# Patient Record
Sex: FEMALE | Race: WHITE | NOT HISPANIC OR LATINO | Employment: OTHER | URBAN - METROPOLITAN AREA
[De-identification: names, ages, dates, MRNs, and addresses within clinical notes are randomized per-mention and may not be internally consistent; named-entity substitution may affect disease eponyms.]

---

## 2017-02-14 ENCOUNTER — OFFICE VISIT (OUTPATIENT)
Dept: RHEUMATOLOGY | Facility: CLINIC | Age: 62
End: 2017-02-14
Payer: MEDICARE

## 2017-02-14 VITALS
WEIGHT: 211.44 LBS | HEART RATE: 62 BPM | DIASTOLIC BLOOD PRESSURE: 99 MMHG | BODY MASS INDEX: 35.18 KG/M2 | SYSTOLIC BLOOD PRESSURE: 177 MMHG

## 2017-02-14 DIAGNOSIS — R73.9 HYPERGLYCEMIA: ICD-10-CM

## 2017-02-14 DIAGNOSIS — M47.817 LUMBAR AND SACRAL OSTEOARTHRITIS: ICD-10-CM

## 2017-02-14 DIAGNOSIS — M79.7 FIBROMYALGIA: ICD-10-CM

## 2017-02-14 DIAGNOSIS — M32.9 SYSTEMIC LUPUS ERYTHEMATOSUS, UNSPECIFIED SLE TYPE, UNSPECIFIED ORGAN INVOLVEMENT STATUS: ICD-10-CM

## 2017-02-14 DIAGNOSIS — B37.9 CANDIDA ALBICANS INFECTION: ICD-10-CM

## 2017-02-14 DIAGNOSIS — R09.81 SINUS CONGESTION: ICD-10-CM

## 2017-02-14 DIAGNOSIS — M54.2 NECK PAIN: ICD-10-CM

## 2017-02-14 PROCEDURE — 99213 OFFICE O/P EST LOW 20 MIN: CPT | Mod: PBBFAC,PO | Performed by: INTERNAL MEDICINE

## 2017-02-14 PROCEDURE — 99999 PR PBB SHADOW E&M-EST. PATIENT-LVL III: CPT | Mod: PBBFAC,,, | Performed by: INTERNAL MEDICINE

## 2017-02-14 PROCEDURE — 99214 OFFICE O/P EST MOD 30 MIN: CPT | Mod: S$PBB,,, | Performed by: INTERNAL MEDICINE

## 2017-02-14 RX ORDER — FLUCONAZOLE 150 MG/1
TABLET ORAL
COMMUNITY
Start: 2017-01-03 | End: 2017-02-14 | Stop reason: SDUPTHER

## 2017-02-14 RX ORDER — CYANOCOBALAMIN 1000 UG/ML
1000 INJECTION, SOLUTION INTRAMUSCULAR; SUBCUTANEOUS
Qty: 10 ML | Refills: 3 | Status: SHIPPED | OUTPATIENT
Start: 2017-02-14 | End: 2017-06-09 | Stop reason: SDUPTHER

## 2017-02-14 RX ORDER — METHYLPREDNISOLONE ACETATE 80 MG/ML
160 INJECTION, SUSPENSION INTRA-ARTICULAR; INTRALESIONAL; INTRAMUSCULAR; SOFT TISSUE DAILY
Qty: 2 ML | Refills: 3 | Status: SHIPPED | OUTPATIENT
Start: 2017-02-14 | End: 2017-07-18 | Stop reason: SDUPTHER

## 2017-02-14 RX ORDER — METOPROLOL TARTRATE 100 MG/1
100 TABLET ORAL 2 TIMES DAILY
Qty: 60 TABLET | Refills: 11 | Status: SHIPPED | OUTPATIENT
Start: 2017-02-14 | End: 2017-06-09 | Stop reason: SDUPTHER

## 2017-02-14 RX ORDER — ACETAMINOPHEN AND CODEINE PHOSPHATE 300; 30 MG/1; MG/1
1 TABLET ORAL 3 TIMES DAILY PRN
Qty: 90 TABLET | Refills: 3 | Status: SHIPPED | OUTPATIENT
Start: 2017-02-14 | End: 2018-01-24 | Stop reason: SDUPTHER

## 2017-02-14 RX ORDER — ESTRADIOL 1 MG/1
1 TABLET ORAL DAILY
Qty: 30 TABLET | Refills: 6 | Status: SHIPPED | OUTPATIENT
Start: 2017-02-14 | End: 2017-06-09 | Stop reason: SDUPTHER

## 2017-02-14 RX ORDER — SYRINGE REUSABLE, 3 ML
1 SYRINGE, REUSABLE MISCELLANEOUS DAILY
Qty: 25 EACH | Refills: 0 | Status: SHIPPED | OUTPATIENT
Start: 2017-02-14 | End: 2018-01-24 | Stop reason: SDUPTHER

## 2017-02-14 RX ORDER — FLUCONAZOLE 150 MG/1
150 TABLET ORAL DAILY
Qty: 5 TABLET | Refills: 0 | Status: SHIPPED | OUTPATIENT
Start: 2017-02-14 | End: 2017-02-19

## 2017-02-14 RX ORDER — HYDROXYCHLOROQUINE SULFATE 200 MG/1
200 TABLET, FILM COATED ORAL 2 TIMES DAILY
Qty: 60 TABLET | Refills: 6 | Status: SHIPPED | OUTPATIENT
Start: 2017-02-14 | End: 2017-06-09 | Stop reason: SDUPTHER

## 2017-02-14 RX ORDER — HYDROCHLOROTHIAZIDE 12.5 MG/1
CAPSULE ORAL
Qty: 30 CAPSULE | Refills: 3 | Status: SHIPPED | OUTPATIENT
Start: 2017-02-14 | End: 2017-06-09 | Stop reason: SDUPTHER

## 2017-02-14 RX ORDER — GABAPENTIN 100 MG/1
100 CAPSULE ORAL 3 TIMES DAILY
Qty: 90 CAPSULE | Refills: 11 | Status: SHIPPED | OUTPATIENT
Start: 2017-02-14 | End: 2017-06-09 | Stop reason: SDUPTHER

## 2017-02-14 RX ORDER — KETOROLAC TROMETHAMINE 30 MG/ML
60 INJECTION, SOLUTION INTRAMUSCULAR; INTRAVENOUS
Qty: 2 ML | Refills: 3 | Status: SHIPPED | OUTPATIENT
Start: 2017-02-14 | End: 2017-07-18 | Stop reason: SDUPTHER

## 2017-02-14 ASSESSMENT — ROUTINE ASSESSMENT OF PATIENT INDEX DATA (RAPID3)
PAIN SCORE: 7
AM STIFFNESS SCORE: 1, YES
PATIENT GLOBAL ASSESSMENT SCORE: 7
MDHAQ FUNCTION SCORE: 1.1
FATIGUE SCORE: 5
TOTAL RAPID3 SCORE: 5.89
PSYCHOLOGICAL DISTRESS SCORE: 4.4

## 2017-02-14 NOTE — PROGRESS NOTES
Subjective:       Patient ID: Angelica Chan is a 61 y.o. female.    Chief Complaint: Follow-up    HPI Comments: Follow up:   Lupus  Flare, Patient complains of arthralgias and myalgias for which has been present for a few years. Pain is located in multiple joints, both shoulder(s), both elbow(s), both wrist(s), both MCP(s): 1st, 2nd, 3rd, 4th and 5th, both PIP(s): 1st, 2nd, 3rd, 4th and 5th, both DIP(s): 1st and 2nd, both hip(s), both knee(s) and both MTP(s): 1st, 2nd, 3rd, 4th and 5th, is described as aching, pulsating, shooting and throbbing, and is constant, moderate .  Associated symptoms include: crepitation, decreased range of motion, edema, effusion, tenderness and warmth.  She is in a flare                    Review of Systems   Constitutional: Positive for activity change and chills. Negative for appetite change, diaphoresis and unexpected weight change.   HENT: Negative for congestion, dental problem, ear discharge, ear pain, facial swelling, mouth sores, nosebleeds, postnasal drip, rhinorrhea, sinus pressure, sneezing, sore throat, tinnitus and voice change.    Eyes: Negative for photophobia, pain, discharge, redness and itching.   Respiratory: Negative for apnea, cough, chest tightness, shortness of breath and wheezing.    Cardiovascular: Positive for leg swelling. Negative for chest pain and palpitations.   Gastrointestinal: Positive for abdominal pain. Negative for abdominal distention, constipation, diarrhea, nausea and vomiting.   Endocrine: Negative for cold intolerance, heat intolerance, polydipsia and polyuria.   Genitourinary: Negative for decreased urine volume, difficulty urinating, flank pain, frequency, hematuria and urgency.   Musculoskeletal: Positive for arthralgias, back pain, gait problem, neck pain and neck stiffness.   Skin: Negative for pallor, rash and wound.   Allergic/Immunologic: Negative for immunocompromised state.   Neurological: Positive for weakness. Negative for dizziness,  tremors and numbness.   Hematological: Negative for adenopathy. Does not bruise/bleed easily.   Psychiatric/Behavioral: Negative for sleep disturbance. The patient is not nervous/anxious.          Objective:     Visit Vitals    BP (!) 177/99 (BP Location: Left arm, Patient Position: Sitting, BP Method: Automatic)    Pulse 62    Wt 95.9 kg (211 lb 6.7 oz)    BMI 35.18 kg/m2        Physical Exam   Vitals reviewed.  Constitutional: She is oriented to person, place, and time. She appears distressed.   HENT:   Head: Normocephalic and atraumatic.   Mouth thrush   Eyes: EOM are normal. Pupils are equal, round, and reactive to light. Right eye exhibits no discharge. Left eye exhibits no discharge.   Neck: Neck supple. No thyromegaly present.   Cardiovascular: Normal rate, regular rhythm and normal heart sounds.  Exam reveals no gallop and no friction rub.    No murmur heard.  Pulmonary/Chest: Breath sounds normal. She has no wheezes. She has no rales. She exhibits no tenderness.   Abdominal: There is no tenderness. There is no rebound and no guarding.       Right Side Rheumatological Exam     Examination finds the elbow normal.    The patient is tender to palpation of the shoulder, wrist, knee, 1st PIP, 1st MCP, 2nd PIP, 2nd MCP, 3rd PIP, 3rd MCP, 4th PIP, 4th MCP, 5th PIP and 5th MCP    She has swelling of the 1st PIP, 1st MCP, 2nd PIP, 2nd MCP, 3rd PIP, 3rd MCP, 4th PIP, 4th MCP, 5th PIP and 5th MCP    Shoulder Exam   Tenderness Location: no tenderness    Range of Motion   Active Abduction: abnormal   Adduction: abnormal  Sensation: normal    Knee Exam   Patellofemoral Crepitus: positive  Effusion: positive  Sensation: normal    Hip Exam   Tenderness Location: posterior  Sensation: normal    Elbow/Wrist Exam   Tenderness Location: no tenderness  Sensation: normal    Left Side Rheumatological Exam     The patient is tender to palpation of the shoulder, elbow, wrist, knee, 1st PIP, 1st MCP, 2nd PIP, 2nd MCP, 3rd PIP,  3rd MCP, 4th PIP, 4th MCP, 5th PIP and 5th MCP.    She has swelling of the 1st PIP, 1st MCP, 2nd PIP, 2nd MCP, 3rd PIP, 3rd MCP, 4th PIP, 4th MCP, 5th PIP and 5th MCP    Shoulder Exam   Tenderness Location: no tenderness    Range of Motion   Active Abduction: abnormal   Sensation: normal    Knee Exam     Patellofemoral Crepitus: positive  Effusion: positive  Sensation: normal    Hip Exam   Tenderness Location: posterior  Sensation: normal    Elbow/Wrist Exam   Sensation: normal      Back/Neck Exam   General Inspection   Gait: normal         Lymphadenopathy:     She has no cervical adenopathy.   Neurological: She is alert and oriented to person, place, and time. Gait normal.   Skin: Skin is dry. No rash noted. No erythema. No pallor.     Psychiatric: Mood and affect normal.   Musculoskeletal: She exhibits edema, tenderness and deformity.      jhonny 3.9 normal less  crp 4.0, cbc, cmp  Assessment:       .  Encounter Diagnoses   Name Primary?    Mixed connective tissue disease     Systemic lupus erythematosus, unspecified SLE type, unspecified organ involvement status     Neck pain     Fibromyalgia     Lumbar and sacral osteoarthritis     Essential hypertension     Thrush     Sinus congestion     Hyperglycemia      Yeast infection     Candida albicans infection         Plan:     Systemic lupus erythematosus, unspecified SLE type, unspecified organ involvement status  -     acetaminophen-codeine 300-30mg (TYLENOL #3) 300-30 mg Tab; Take 1 tablet by mouth 3 (three) times daily as needed.  Dispense: 90 tablet; Refill: 3  -     cyanocobalamin 1,000 mcg/mL injection; Inject 1 mL (1,000 mcg total) into the skin every 7 days.  Dispense: 10 mL; Refill: 3  -     estradiol (ESTRACE) 1 MG tablet; Take 1 tablet (1 mg total) by mouth once daily.  Dispense: 30 tablet; Refill: 6  -     gabapentin (NEURONTIN) 100 MG capsule; Take 1 capsule (100 mg total) by mouth 3 (three) times daily.  Dispense: 90 capsule; Refill: 11  -      hydrochlorothiazide (MICROZIDE) 12.5 mg capsule; Take 1 capsule(12.5 mg total) by mouth once daily.  Dispense: 30 capsule; Refill: 3  -     hydroxychloroquine (PLAQUENIL) 200 mg tablet; Take 1 tablet (200 mg total) by mouth 2 (two) times daily.  Dispense: 60 tablet; Refill: 6  -     ketorolac (TORADOL) 60 mg/2 mL Soln; Inject 2 mLs (60 mg total) into the muscle every 30 days.  Dispense: 2 mL; Refill: 3  -     methylPREDNISolone acetate (DEPO-MEDROL) 80 mg/mL injection; Inject 2 mLs (160 mg total) into the muscle once daily.  Dispense: 2 mL; Refill: 3  -     metoprolol tartrate (LOPRESSOR) 100 MG tablet; Take 1 tablet (100 mg total) by mouth 2 (two) times daily.  Dispense: 60 tablet; Refill: 11  -     MARTI; Future; Expected date: 2/14/17  -     Anti Sm/RNP Antibody; Future; Expected date: 2/14/17  -     Anti-DNA antibody, double-stranded; Future; Expected date: 2/14/17  -     Anti-Histone Antibody; Future; Expected date: 2/14/17  -     Anti-scleroderma antibody; Future; Expected date: 2/14/17  -     Anti-Smith antibody; Future; Expected date: 2/14/17  -     Sjogrens syndrome-A extractable nuclear antibody; Future; Expected date: 2/14/17  -     Sjogrens syndrome-B extractable nuclear antibody; Future; Expected date: 2/14/17  -     Sedimentation rate, manual; Future; Expected date: 2/14/17  -     Comprehensive metabolic panel; Future; Expected date: 2/14/17  -     CBC auto differential; Future; Expected date: 2/14/17  -     C-reactive protein; Future; Expected date: 2/14/17  -     TSH; Future; Expected date: 2/14/17  -     T4, free; Future; Expected date: 2/14/17  -     T3, free; Future; Expected date: 2/14/17    Neck pain  -     acetaminophen-codeine 300-30mg (TYLENOL #3) 300-30 mg Tab; Take 1 tablet by mouth 3 (three) times daily as needed.  Dispense: 90 tablet; Refill: 3  -     estradiol (ESTRACE) 1 MG tablet; Take 1 tablet (1 mg total) by mouth once daily.  Dispense: 30 tablet; Refill: 6  -     gabapentin  (NEURONTIN) 100 MG capsule; Take 1 capsule (100 mg total) by mouth 3 (three) times daily.  Dispense: 90 capsule; Refill: 11  -     hydrochlorothiazide (MICROZIDE) 12.5 mg capsule; Take 1 capsule(12.5 mg total) by mouth once daily.  Dispense: 30 capsule; Refill: 3  -     hydroxychloroquine (PLAQUENIL) 200 mg tablet; Take 1 tablet (200 mg total) by mouth 2 (two) times daily.  Dispense: 60 tablet; Refill: 6  -     metoprolol tartrate (LOPRESSOR) 100 MG tablet; Take 1 tablet (100 mg total) by mouth 2 (two) times daily.  Dispense: 60 tablet; Refill: 11  -     MARTI; Future; Expected date: 2/14/17  -     Anti Sm/RNP Antibody; Future; Expected date: 2/14/17  -     Anti-DNA antibody, double-stranded; Future; Expected date: 2/14/17  -     Anti-Histone Antibody; Future; Expected date: 2/14/17  -     Anti-scleroderma antibody; Future; Expected date: 2/14/17  -     Anti-Smith antibody; Future; Expected date: 2/14/17  -     Sjogrens syndrome-A extractable nuclear antibody; Future; Expected date: 2/14/17  -     Sjogrens syndrome-B extractable nuclear antibody; Future; Expected date: 2/14/17  -     Sedimentation rate, manual; Future; Expected date: 2/14/17  -     Comprehensive metabolic panel; Future; Expected date: 2/14/17  -     CBC auto differential; Future; Expected date: 2/14/17  -     C-reactive protein; Future; Expected date: 2/14/17  -     TSH; Future; Expected date: 2/14/17  -     T4, free; Future; Expected date: 2/14/17  -     T3, free; Future; Expected date: 2/14/17    Fibromyalgia  -     acetaminophen-codeine 300-30mg (TYLENOL #3) 300-30 mg Tab; Take 1 tablet by mouth 3 (three) times daily as needed.  Dispense: 90 tablet; Refill: 3  -     cyanocobalamin 1,000 mcg/mL injection; Inject 1 mL (1,000 mcg total) into the skin every 7 days.  Dispense: 10 mL; Refill: 3  -     estradiol (ESTRACE) 1 MG tablet; Take 1 tablet (1 mg total) by mouth once daily.  Dispense: 30 tablet; Refill: 6  -     gabapentin (NEURONTIN) 100 MG  capsule; Take 1 capsule (100 mg total) by mouth 3 (three) times daily.  Dispense: 90 capsule; Refill: 11  -     hydrochlorothiazide (MICROZIDE) 12.5 mg capsule; Take 1 capsule(12.5 mg total) by mouth once daily.  Dispense: 30 capsule; Refill: 3  -     hydroxychloroquine (PLAQUENIL) 200 mg tablet; Take 1 tablet (200 mg total) by mouth 2 (two) times daily.  Dispense: 60 tablet; Refill: 6  -     ketorolac (TORADOL) 60 mg/2 mL Soln; Inject 2 mLs (60 mg total) into the muscle every 30 days.  Dispense: 2 mL; Refill: 3  -     methylPREDNISolone acetate (DEPO-MEDROL) 80 mg/mL injection; Inject 2 mLs (160 mg total) into the muscle once daily.  Dispense: 2 mL; Refill: 3  -     metoprolol tartrate (LOPRESSOR) 100 MG tablet; Take 1 tablet (100 mg total) by mouth 2 (two) times daily.  Dispense: 60 tablet; Refill: 11  -     MARTI; Future; Expected date: 2/14/17  -     Anti Sm/RNP Antibody; Future; Expected date: 2/14/17  -     Anti-DNA antibody, double-stranded; Future; Expected date: 2/14/17  -     Anti-Histone Antibody; Future; Expected date: 2/14/17  -     Anti-scleroderma antibody; Future; Expected date: 2/14/17  -     Anti-Smith antibody; Future; Expected date: 2/14/17  -     Sjogrens syndrome-A extractable nuclear antibody; Future; Expected date: 2/14/17  -     Sjogrens syndrome-B extractable nuclear antibody; Future; Expected date: 2/14/17  -     Sedimentation rate, manual; Future; Expected date: 2/14/17  -     Comprehensive metabolic panel; Future; Expected date: 2/14/17  -     CBC auto differential; Future; Expected date: 2/14/17  -     C-reactive protein; Future; Expected date: 2/14/17  -     TSH; Future; Expected date: 2/14/17  -     T4, free; Future; Expected date: 2/14/17  -     T3, free; Future; Expected date: 2/14/17    Lumbar and sacral osteoarthritis  -     acetaminophen-codeine 300-30mg (TYLENOL #3) 300-30 mg Tab; Take 1 tablet by mouth 3 (three) times daily as needed.  Dispense: 90 tablet; Refill: 3  -      estradiol (ESTRACE) 1 MG tablet; Take 1 tablet (1 mg total) by mouth once daily.  Dispense: 30 tablet; Refill: 6  -     gabapentin (NEURONTIN) 100 MG capsule; Take 1 capsule (100 mg total) by mouth 3 (three) times daily.  Dispense: 90 capsule; Refill: 11  -     hydrochlorothiazide (MICROZIDE) 12.5 mg capsule; Take 1 capsule(12.5 mg total) by mouth once daily.  Dispense: 30 capsule; Refill: 3  -     hydroxychloroquine (PLAQUENIL) 200 mg tablet; Take 1 tablet (200 mg total) by mouth 2 (two) times daily.  Dispense: 60 tablet; Refill: 6  -     metoprolol tartrate (LOPRESSOR) 100 MG tablet; Take 1 tablet (100 mg total) by mouth 2 (two) times daily.  Dispense: 60 tablet; Refill: 11  -     MARTI; Future; Expected date: 2/14/17  -     Anti Sm/RNP Antibody; Future; Expected date: 2/14/17  -     Anti-DNA antibody, double-stranded; Future; Expected date: 2/14/17  -     Anti-Histone Antibody; Future; Expected date: 2/14/17  -     Anti-scleroderma antibody; Future; Expected date: 2/14/17  -     Anti-Smith antibody; Future; Expected date: 2/14/17  -     Sjogrens syndrome-A extractable nuclear antibody; Future; Expected date: 2/14/17  -     Sjogrens syndrome-B extractable nuclear antibody; Future; Expected date: 2/14/17  -     Sedimentation rate, manual; Future; Expected date: 2/14/17  -     Comprehensive metabolic panel; Future; Expected date: 2/14/17  -     CBC auto differential; Future; Expected date: 2/14/17  -     C-reactive protein; Future; Expected date: 2/14/17  -     TSH; Future; Expected date: 2/14/17  -     T4, free; Future; Expected date: 2/14/17  -     T3, free; Future; Expected date: 2/14/17    Sinus congestion  -     cyanocobalamin 1,000 mcg/mL injection; Inject 1 mL (1,000 mcg total) into the skin every 7 days.  Dispense: 10 mL; Refill: 3  -     ketorolac (TORADOL) 60 mg/2 mL Soln; Inject 2 mLs (60 mg total) into the muscle every 30 days.  Dispense: 2 mL; Refill: 3  -     methylPREDNISolone acetate (DEPO-MEDROL) 80  mg/mL injection; Inject 2 mLs (160 mg total) into the muscle once daily.  Dispense: 2 mL; Refill: 3  -     MARTI; Future; Expected date: 2/14/17  -     Anti Sm/RNP Antibody; Future; Expected date: 2/14/17  -     Anti-DNA antibody, double-stranded; Future; Expected date: 2/14/17  -     Anti-Histone Antibody; Future; Expected date: 2/14/17  -     Anti-scleroderma antibody; Future; Expected date: 2/14/17  -     Anti-Smith antibody; Future; Expected date: 2/14/17  -     Sjogrens syndrome-A extractable nuclear antibody; Future; Expected date: 2/14/17  -     Sjogrens syndrome-B extractable nuclear antibody; Future; Expected date: 2/14/17  -     Sedimentation rate, manual; Future; Expected date: 2/14/17  -     Comprehensive metabolic panel; Future; Expected date: 2/14/17  -     CBC auto differential; Future; Expected date: 2/14/17  -     C-reactive protein; Future; Expected date: 2/14/17  -     TSH; Future; Expected date: 2/14/17  -     T4, free; Future; Expected date: 2/14/17  -     T3, free; Future; Expected date: 2/14/17    Hyperglycemia   -     estradiol (ESTRACE) 1 MG tablet; Take 1 tablet (1 mg total) by mouth once daily.  Dispense: 30 tablet; Refill: 6  -     gabapentin (NEURONTIN) 100 MG capsule; Take 1 capsule (100 mg total) by mouth 3 (three) times daily.  Dispense: 90 capsule; Refill: 11  -     hydrochlorothiazide (MICROZIDE) 12.5 mg capsule; Take 1 capsule(12.5 mg total) by mouth once daily.  Dispense: 30 capsule; Refill: 3  -     metoprolol tartrate (LOPRESSOR) 100 MG tablet; Take 1 tablet (100 mg total) by mouth 2 (two) times daily.  Dispense: 60 tablet; Refill: 11  -     MARTI; Future; Expected date: 2/14/17  -     Anti Sm/RNP Antibody; Future; Expected date: 2/14/17  -     Anti-DNA antibody, double-stranded; Future; Expected date: 2/14/17  -     Anti-Histone Antibody; Future; Expected date: 2/14/17  -     Anti-scleroderma antibody; Future; Expected date: 2/14/17  -     Anti-Smith antibody; Future; Expected  date: 2/14/17  -     Sjogrens syndrome-A extractable nuclear antibody; Future; Expected date: 2/14/17  -     Sjogrens syndrome-B extractable nuclear antibody; Future; Expected date: 2/14/17  -     Sedimentation rate, manual; Future; Expected date: 2/14/17  -     Comprehensive metabolic panel; Future; Expected date: 2/14/17  -     CBC auto differential; Future; Expected date: 2/14/17  -     C-reactive protein; Future; Expected date: 2/14/17  -     TSH; Future; Expected date: 2/14/17  -     T4, free; Future; Expected date: 2/14/17  -     T3, free; Future; Expected date: 2/14/17    Candida albicans infection  -     hydrochlorothiazide (MICROZIDE) 12.5 mg capsule; Take 1 capsule(12.5 mg total) by mouth once daily.  Dispense: 30 capsule; Refill: 3  -     MARTI; Future; Expected date: 2/14/17  -     Anti Sm/RNP Antibody; Future; Expected date: 2/14/17  -     Anti-DNA antibody, double-stranded; Future; Expected date: 2/14/17  -     Anti-Histone Antibody; Future; Expected date: 2/14/17  -     Anti-scleroderma antibody; Future; Expected date: 2/14/17  -     Anti-Smith antibody; Future; Expected date: 2/14/17  -     Sjogrens syndrome-A extractable nuclear antibody; Future; Expected date: 2/14/17  -     Sjogrens syndrome-B extractable nuclear antibody; Future; Expected date: 2/14/17  -     Sedimentation rate, manual; Future; Expected date: 2/14/17  -     Comprehensive metabolic panel; Future; Expected date: 2/14/17  -     CBC auto differential; Future; Expected date: 2/14/17  -     C-reactive protein; Future; Expected date: 2/14/17  -     TSH; Future; Expected date: 2/14/17  -     T4, free; Future; Expected date: 2/14/17  -     T3, free; Future; Expected date: 2/14/17    Other orders  -     fluconazole (DIFLUCAN) 150 MG Tab; Take 1 tablet (150 mg total) by mouth once daily.  Dispense: 5 tablet; Refill: 0

## 2017-03-25 DIAGNOSIS — M54.2 NECK PAIN: ICD-10-CM

## 2017-03-25 DIAGNOSIS — M32.9 LUPUS: ICD-10-CM

## 2017-03-25 DIAGNOSIS — R73.9 HYPERGLYCEMIA: ICD-10-CM

## 2017-03-25 DIAGNOSIS — M79.7 FIBROMYALGIA: ICD-10-CM

## 2017-03-25 DIAGNOSIS — B37.9 CANDIDA ALBICANS INFECTION: ICD-10-CM

## 2017-03-25 DIAGNOSIS — M35.1 MIXED CONNECTIVE TISSUE DISEASE: ICD-10-CM

## 2017-03-25 DIAGNOSIS — M47.817 LUMBAR AND SACRAL OSTEOARTHRITIS: ICD-10-CM

## 2017-03-25 RX ORDER — ESTRADIOL 1 MG/1
TABLET ORAL
Qty: 30 TABLET | Refills: 3 | Status: SHIPPED | OUTPATIENT
Start: 2017-03-25 | End: 2018-01-24 | Stop reason: SDUPTHER

## 2017-03-25 RX ORDER — HYDROCHLOROTHIAZIDE 12.5 MG/1
CAPSULE ORAL
Qty: 30 CAPSULE | Refills: 3 | Status: SHIPPED | OUTPATIENT
Start: 2017-03-25 | End: 2018-01-24 | Stop reason: SDUPTHER

## 2017-05-31 ENCOUNTER — TELEPHONE (OUTPATIENT)
Dept: RHEUMATOLOGY | Facility: CLINIC | Age: 62
End: 2017-05-31

## 2017-05-31 DIAGNOSIS — T14.90XA TRAUMA: Primary | ICD-10-CM

## 2017-06-05 ENCOUNTER — TELEPHONE (OUTPATIENT)
Dept: RHEUMATOLOGY | Facility: CLINIC | Age: 62
End: 2017-06-05

## 2017-06-05 NOTE — TELEPHONE ENCOUNTER
Attempted to contact pt in regards to lab and xray results. Left message informing pt that results have not been received. Informed that when results received and reviewed someone will contact her.

## 2017-06-05 NOTE — TELEPHONE ENCOUNTER
----- Message from Sridevi Jennings sent at 6/5/2017  3:12 PM CDT -----  Pt had labs and a x-ray at   [facility] in [place] / on June 1 or 2 ...had labs and a x-ray / please call with results [phone] (home)

## 2017-06-09 DIAGNOSIS — R73.9 HYPERGLYCEMIA: ICD-10-CM

## 2017-06-09 DIAGNOSIS — R09.81 SINUS CONGESTION: ICD-10-CM

## 2017-06-09 DIAGNOSIS — I10 ESSENTIAL HYPERTENSION: ICD-10-CM

## 2017-06-09 DIAGNOSIS — M32.9 SYSTEMIC LUPUS ERYTHEMATOSUS, UNSPECIFIED SLE TYPE, UNSPECIFIED ORGAN INVOLVEMENT STATUS: ICD-10-CM

## 2017-06-09 DIAGNOSIS — M47.817 LUMBAR AND SACRAL OSTEOARTHRITIS: ICD-10-CM

## 2017-06-09 DIAGNOSIS — B37.9 CANDIDA ALBICANS INFECTION: ICD-10-CM

## 2017-06-09 DIAGNOSIS — M79.7 FIBROMYALGIA: ICD-10-CM

## 2017-06-09 DIAGNOSIS — M35.1 MIXED CONNECTIVE TISSUE DISEASE: ICD-10-CM

## 2017-06-09 DIAGNOSIS — M54.2 NECK PAIN: ICD-10-CM

## 2017-06-09 DIAGNOSIS — B37.9 YEAST INFECTION: ICD-10-CM

## 2017-06-09 RX ORDER — HYDROXYCHLOROQUINE SULFATE 200 MG/1
200 TABLET, FILM COATED ORAL 2 TIMES DAILY
Qty: 180 TABLET | Refills: 3 | Status: SHIPPED | OUTPATIENT
Start: 2017-06-09 | End: 2018-01-24 | Stop reason: SDUPTHER

## 2017-06-09 RX ORDER — CYANOCOBALAMIN 1000 UG/ML
1000 INJECTION, SOLUTION INTRAMUSCULAR; SUBCUTANEOUS
Qty: 30 ML | Refills: 3 | Status: SHIPPED | OUTPATIENT
Start: 2017-06-09 | End: 2018-01-24 | Stop reason: SDUPTHER

## 2017-06-09 RX ORDER — ESTRADIOL 1 MG/1
1 TABLET ORAL DAILY
Qty: 90 TABLET | Refills: 3 | Status: SHIPPED | OUTPATIENT
Start: 2017-06-09 | End: 2017-07-18 | Stop reason: SDUPTHER

## 2017-06-09 RX ORDER — GABAPENTIN 100 MG/1
100 CAPSULE ORAL 3 TIMES DAILY
Qty: 270 CAPSULE | Refills: 3 | Status: SHIPPED | OUTPATIENT
Start: 2017-06-09 | End: 2018-01-24 | Stop reason: SDUPTHER

## 2017-06-09 RX ORDER — METOPROLOL TARTRATE 100 MG/1
100 TABLET ORAL 2 TIMES DAILY
Qty: 180 TABLET | Refills: 3 | Status: SHIPPED | OUTPATIENT
Start: 2017-06-09 | End: 2018-01-24 | Stop reason: SDUPTHER

## 2017-06-09 RX ORDER — HYDROCHLOROTHIAZIDE 12.5 MG/1
CAPSULE ORAL
Qty: 90 CAPSULE | Refills: 3 | Status: SHIPPED | OUTPATIENT
Start: 2017-06-09 | End: 2017-07-18 | Stop reason: SDUPTHER

## 2017-06-09 RX ORDER — ESCITALOPRAM OXALATE 20 MG/1
20 TABLET ORAL DAILY
Qty: 90 TABLET | Refills: 3 | Status: SHIPPED | OUTPATIENT
Start: 2017-06-09 | End: 2018-01-24 | Stop reason: SDUPTHER

## 2017-06-09 NOTE — TELEPHONE ENCOUNTER
Contacted pt in regards to lab results and xray results. Informed pt that lab results are normal and xray shows a inferior bone spur on foot. Referring to podiatry. Pt verbalized understanding.

## 2017-06-27 RX ORDER — METOPROLOL TARTRATE 100 MG/1
TABLET ORAL
Qty: 60 TABLET | Refills: 3 | Status: SHIPPED | OUTPATIENT
Start: 2017-06-27 | End: 2017-07-18 | Stop reason: SDUPTHER

## 2017-07-18 ENCOUNTER — OFFICE VISIT (OUTPATIENT)
Dept: PODIATRY | Facility: CLINIC | Age: 62
End: 2017-07-18
Payer: MEDICARE

## 2017-07-18 ENCOUNTER — OFFICE VISIT (OUTPATIENT)
Dept: RHEUMATOLOGY | Facility: CLINIC | Age: 62
End: 2017-07-18
Payer: MEDICARE

## 2017-07-18 VITALS
HEART RATE: 59 BPM | DIASTOLIC BLOOD PRESSURE: 90 MMHG | WEIGHT: 206.81 LBS | BODY MASS INDEX: 34.41 KG/M2 | SYSTOLIC BLOOD PRESSURE: 147 MMHG

## 2017-07-18 VITALS — HEIGHT: 65 IN | WEIGHT: 204.56 LBS | BODY MASS INDEX: 34.08 KG/M2

## 2017-07-18 DIAGNOSIS — M72.2 PLANTAR FASCIITIS: ICD-10-CM

## 2017-07-18 DIAGNOSIS — M35.1 MCTD (MIXED CONNECTIVE TISSUE DISEASE): ICD-10-CM

## 2017-07-18 DIAGNOSIS — M79.7 FIBROMYALGIA: Primary | ICD-10-CM

## 2017-07-18 DIAGNOSIS — M47.817 LUMBAR AND SACRAL ARTHRITIS: ICD-10-CM

## 2017-07-18 DIAGNOSIS — M79.7 FIBROMYALGIA: ICD-10-CM

## 2017-07-18 DIAGNOSIS — M47.817 LUMBAR AND SACRAL OSTEOARTHRITIS: ICD-10-CM

## 2017-07-18 DIAGNOSIS — M35.00 SJOGREN'S SYNDROME, WITH UNSPECIFIED ORGAN INVOLVEMENT: ICD-10-CM

## 2017-07-18 DIAGNOSIS — M32.9 SLE (SYSTEMIC LUPUS ERYTHEMATOSUS RELATED SYNDROME): ICD-10-CM

## 2017-07-18 DIAGNOSIS — M32.9 SYSTEMIC LUPUS ERYTHEMATOSUS, UNSPECIFIED SLE TYPE, UNSPECIFIED ORGAN INVOLVEMENT STATUS: Primary | ICD-10-CM

## 2017-07-18 DIAGNOSIS — Z02.71 DISABILITY EXAMINATION: ICD-10-CM

## 2017-07-18 PROCEDURE — 99213 OFFICE O/P EST LOW 20 MIN: CPT | Mod: PBBFAC,25,27,PO | Performed by: INTERNAL MEDICINE

## 2017-07-18 PROCEDURE — 99203 OFFICE O/P NEW LOW 30 MIN: CPT | Mod: S$PBB,25,,

## 2017-07-18 PROCEDURE — 99999 PR PBB SHADOW E&M-EST. PATIENT-LVL III: CPT | Mod: PBBFAC,,,

## 2017-07-18 PROCEDURE — 20605 DRAIN/INJ JOINT/BURSA W/O US: CPT | Mod: PBBFAC,PO

## 2017-07-18 PROCEDURE — 99214 OFFICE O/P EST MOD 30 MIN: CPT | Mod: S$PBB,,, | Performed by: INTERNAL MEDICINE

## 2017-07-18 PROCEDURE — 99213 OFFICE O/P EST LOW 20 MIN: CPT | Mod: PBBFAC,PO,25

## 2017-07-18 PROCEDURE — 99999 PR PBB SHADOW E&M-EST. PATIENT-LVL III: CPT | Mod: PBBFAC,,, | Performed by: INTERNAL MEDICINE

## 2017-07-18 PROCEDURE — 20605 DRAIN/INJ JOINT/BURSA W/O US: CPT | Mod: S$PBB,LT,,

## 2017-07-18 RX ORDER — TRIAMCINOLONE ACETONIDE 40 MG/ML
12 INJECTION, SUSPENSION INTRA-ARTICULAR; INTRAMUSCULAR
Status: COMPLETED | OUTPATIENT
Start: 2017-07-19 | End: 2017-07-18

## 2017-07-18 RX ORDER — METHYLPREDNISOLONE ACETATE 80 MG/ML
160 INJECTION, SUSPENSION INTRA-ARTICULAR; INTRALESIONAL; INTRAMUSCULAR; SOFT TISSUE
Qty: 6 ML | Refills: 3 | Status: SHIPPED | OUTPATIENT
Start: 2017-07-18 | End: 2018-01-24 | Stop reason: SDUPTHER

## 2017-07-18 RX ORDER — KETOROLAC TROMETHAMINE 30 MG/ML
60 INJECTION, SOLUTION INTRAMUSCULAR; INTRAVENOUS
Qty: 6 ML | Refills: 3 | Status: SHIPPED | OUTPATIENT
Start: 2017-07-18 | End: 2017-10-16

## 2017-07-18 RX ORDER — DEXAMETHASONE SODIUM PHOSPHATE 4 MG/ML
4 INJECTION, SOLUTION INTRA-ARTICULAR; INTRALESIONAL; INTRAMUSCULAR; INTRAVENOUS; SOFT TISSUE
Status: COMPLETED | OUTPATIENT
Start: 2017-07-19 | End: 2017-07-18

## 2017-07-18 RX ADMIN — DEXAMETHASONE SODIUM PHOSPHATE 4 MG: 4 INJECTION, SOLUTION INTRAMUSCULAR; INTRAVENOUS at 11:07

## 2017-07-18 RX ADMIN — TRIAMCINOLONE ACETONIDE 12 MG: 40 INJECTION, SUSPENSION INTRA-ARTICULAR; INTRAMUSCULAR at 11:07

## 2017-07-18 ASSESSMENT — ROUTINE ASSESSMENT OF PATIENT INDEX DATA (RAPID3)
MDHAQ FUNCTION SCORE: 1.6
AM STIFFNESS SCORE: 1, YES
PAIN SCORE: 9
FATIGUE SCORE: 0
TOTAL RAPID3 SCORE: 5.78
PATIENT GLOBAL ASSESSMENT SCORE: 3
WHEN YOU AWAKENED IN THE MORNING OVER THE LAST WEEK, PLEASE INDICATE THE AMOUNT OF TIME IT TAKES UNTIL YOU ARE AS LIMBER AS YOU WILL BE FOR THE DAY: ONE HOUR AFTER WAKING
PSYCHOLOGICAL DISTRESS SCORE: 3.3

## 2017-07-18 NOTE — LETTER
July 18, 2017      Sherwin Moctezuma MD  [address] [facility] [address] [place] [address]           [facility]  [address] [facility] [address] [place] [address]  Phone: [phone]          Patient: Angelica Chan   MR Number: [id number]   YOB: 1955   Date of Visit: 7/18/2017       Dear Dr. Sherwin Moctezuma:    Thank you for referring Angelica Chan to me for evaluation. Attached you will find relevant portions of my assessment and plan of care.    If you have questions, please do not hesitate to call me. I look forward to following Angelica Chan along with you.    Sincerely,    Andre Granados, VLADIMIR    Enclosure  CC:  No Recipients    If you would like to receive this communication electronically, please contact [email or web address] or [phone] to request more information on [email or web address] Link access.    For providers and/or their staff who would like to refer a patient to [facility], please contact us through our one-stop-shop provider referral line, [facility], at [phone].    If you feel you have received this communication in error or would no longer like to receive these types of communications, please e-mail [email or web address]

## 2017-07-18 NOTE — PROGRESS NOTES
Subjective:       Patient ID: Angelica Chan is a 61 y.o. female.    Chief Complaint: Follow-up    Follow up:   Lupus  Flare, Patient complains of arthralgias and myalgias for which has been present for a few years. Pain is located in multiple joints, both shoulder(s), both elbow(s), both wrist(s), both MCP(s): 1st, 2nd, 3rd, 4th and 5th, both PIP(s): 1st, 2nd, 3rd, 4th and 5th, both DIP(s): 1st and 2nd, both hip(s), both knee(s) and both MTP(s): 1st, 2nd, 3rd, 4th and 5th, is described as aching, pulsating, shooting and throbbing, and is constant, moderate .  Associated symptoms include: crepitation, decreased range of motion, edema, effusion, tenderness and warmth.  She is in a flare      Review of Systems   Constitutional: Positive for activity change and chills. Negative for appetite change, diaphoresis and unexpected weight change.   HENT: Negative for congestion, dental problem, ear discharge, ear pain, facial swelling, mouth sores, nosebleeds, postnasal drip, rhinorrhea, sinus pressure, sneezing, sore throat, tinnitus and voice change.    Eyes: Negative for photophobia, pain, discharge, redness and itching.   Respiratory: Negative for apnea, cough, chest tightness, shortness of breath and wheezing.    Cardiovascular: Positive for leg swelling. Negative for chest pain and palpitations.   Gastrointestinal: Positive for abdominal pain. Negative for abdominal distention, constipation, diarrhea, nausea and vomiting.   Endocrine: Negative for cold intolerance, heat intolerance, polydipsia and polyuria.   Genitourinary: Negative for decreased urine volume, difficulty urinating, flank pain, frequency, hematuria and urgency.   Musculoskeletal: Positive for arthralgias, back pain, gait problem, neck pain and neck stiffness.   Skin: Negative for pallor, rash and wound.   Allergic/Immunologic: Negative for immunocompromised state.   Neurological: Positive for weakness. Negative for dizziness, tremors and numbness.    Hematological: Negative for adenopathy. Does not bruise/bleed easily.   Psychiatric/Behavioral: Negative for sleep disturbance. The patient is not nervous/anxious.          Objective:     BP (!) 147/90 (BP Location: Left arm, Patient Position: Sitting, BP Method: Automatic)   Pulse (!) 59   Wt 93.8 kg (206 lb 12.8 oz)   BMI 34.41 kg/m²      Physical Exam   Vitals reviewed.  Constitutional: She is oriented to person, place, and time. She appears distressed.   HENT:   Head: Normocephalic and atraumatic.   Mouth thrush   Eyes: EOM are normal. Pupils are equal, round, and reactive to light. Right eye exhibits no discharge. Left eye exhibits no discharge.   Neck: Neck supple. No thyromegaly present.   Cardiovascular: Normal rate, regular rhythm and normal heart sounds.  Exam reveals no gallop and no friction rub.    No murmur heard.  Pulmonary/Chest: Breath sounds normal. She has no wheezes. She has no rales. She exhibits no tenderness.   Abdominal: There is no tenderness. There is no rebound and no guarding.       Right Side Rheumatological Exam     Examination finds the elbow normal.    The patient is tender to palpation of the shoulder, wrist, knee, 1st PIP, 1st MCP, 2nd PIP, 2nd MCP, 3rd PIP, 3rd MCP, 4th PIP, 4th MCP, 5th PIP and 5th MCP    She has swelling of the 1st PIP, 1st MCP, 2nd PIP, 2nd MCP, 3rd PIP, 3rd MCP, 4th PIP, 4th MCP, 5th PIP and 5th MCP    Shoulder Exam   Tenderness Location: no tenderness    Range of Motion   Active Abduction: abnormal   Adduction: abnormal  Sensation: normal    Knee Exam   Patellofemoral Crepitus: positive  Effusion: positive  Sensation: normal    Hip Exam   Tenderness Location: posterior  Sensation: normal    Elbow/Wrist Exam   Tenderness Location: no tenderness  Sensation: normal    Left Side Rheumatological Exam     The patient is tender to palpation of the shoulder, elbow, wrist, knee, 1st PIP, 1st MCP, 2nd PIP, 2nd MCP, 3rd PIP, 3rd MCP, 4th PIP, 4th MCP, 5th PIP and  5th MCP.    She has swelling of the 1st PIP, 1st MCP, 2nd PIP, 2nd MCP, 3rd PIP, 3rd MCP, 4th PIP, 4th MCP, 5th PIP and 5th MCP    Shoulder Exam   Tenderness Location: no tenderness    Range of Motion   Active Abduction: abnormal   Sensation: normal    Knee Exam     Patellofemoral Crepitus: positive  Effusion: positive  Sensation: normal    Hip Exam   Tenderness Location: posterior  Sensation: normal    Elbow/Wrist Exam   Sensation: normal      Back/Neck Exam   General Inspection   Gait: normal         Lymphadenopathy:     She has no cervical adenopathy.   Neurological: She is alert and oriented to person, place, and time. Gait normal.   Skin: Skin is dry. No rash noted. No erythema. No pallor.     Psychiatric: Mood and affect normal.   Musculoskeletal: She exhibits edema, tenderness and deformity.      jhonny 3.9 normal less  crp 4.0, cbc, cmp  Assessment:       .  Encounter Diagnoses   Name Primary?    Systemic lupus erythematosus, unspecified SLE type, unspecified organ involvement status Yes    MCTD (mixed connective tissue disease)     Sjogren's syndrome, with unspecified organ involvement     Fibromyalgia     Lumbar and sacral arthritis     Disability examination         Plan:     Systemic lupus erythematosus, unspecified SLE type, unspecified organ involvement status  -     methylPREDNISolone acetate (DEPO-MEDROL) 80 mg/mL injection; Inject 2 mLs (160 mg total) into the muscle every 30 days.  Dispense: 6 mL; Refill: 3  -     ketorolac (TORADOL) 60 mg/2 mL Soln; Inject 2 mLs (60 mg total) into the muscle every 30 days.  Dispense: 6 mL; Refill: 3  -     JHONNY; Future; Expected date: 07/18/2017  -     Anti Sm/RNP Antibody; Future; Expected date: 07/18/2017  -     Anti-DNA antibody, double-stranded; Future; Expected date: 07/18/2017  -     Sjogrens syndrome-A extractable nuclear antibody; Future; Expected date: 07/18/2017  -     Sjogrens syndrome-B extractable nuclear antibody; Future; Expected date:  07/18/2017  -     Anti-scleroderma antibody; Future; Expected date: 07/18/2017  -     Anti-Smith antibody; Future; Expected date: 07/18/2017  -     Vitamin D; Future; Expected date: 07/18/2017  -     C-reactive protein; Future; Expected date: 07/18/2017  -     Sedimentation rate, manual; Future; Expected date: 07/18/2017  -     CBC auto differential; Future; Expected date: 07/18/2017  -     Comprehensive metabolic panel; Future; Expected date: 07/18/2017    MCTD (mixed connective tissue disease)  -     MARTI; Future; Expected date: 07/18/2017  -     Anti Sm/RNP Antibody; Future; Expected date: 07/18/2017  -     Anti-DNA antibody, double-stranded; Future; Expected date: 07/18/2017  -     Sjogrens syndrome-A extractable nuclear antibody; Future; Expected date: 07/18/2017  -     Sjogrens syndrome-B extractable nuclear antibody; Future; Expected date: 07/18/2017  -     Anti-scleroderma antibody; Future; Expected date: 07/18/2017  -     Anti-Smith antibody; Future; Expected date: 07/18/2017  -     Vitamin D; Future; Expected date: 07/18/2017  -     C-reactive protein; Future; Expected date: 07/18/2017  -     Sedimentation rate, manual; Future; Expected date: 07/18/2017  -     CBC auto differential; Future; Expected date: 07/18/2017  -     Comprehensive metabolic panel; Future; Expected date: 07/18/2017    Sjogren's syndrome, with unspecified organ involvement  -     methylPREDNISolone acetate (DEPO-MEDROL) 80 mg/mL injection; Inject 2 mLs (160 mg total) into the muscle every 30 days.  Dispense: 6 mL; Refill: 3  -     MARTI; Future; Expected date: 07/18/2017  -     Anti Sm/RNP Antibody; Future; Expected date: 07/18/2017  -     Anti-DNA antibody, double-stranded; Future; Expected date: 07/18/2017  -     Sjogrens syndrome-A extractable nuclear antibody; Future; Expected date: 07/18/2017  -     Sjogrens syndrome-B extractable nuclear antibody; Future; Expected date: 07/18/2017  -     Anti-scleroderma antibody; Future; Expected  date: 07/18/2017  -     Anti-Smith antibody; Future; Expected date: 07/18/2017  -     Vitamin D; Future; Expected date: 07/18/2017  -     C-reactive protein; Future; Expected date: 07/18/2017  -     Sedimentation rate, manual; Future; Expected date: 07/18/2017  -     CBC auto differential; Future; Expected date: 07/18/2017  -     Comprehensive metabolic panel; Future; Expected date: 07/18/2017    Fibromyalgia  -     methylPREDNISolone acetate (DEPO-MEDROL) 80 mg/mL injection; Inject 2 mLs (160 mg total) into the muscle every 30 days.  Dispense: 6 mL; Refill: 3  -     ketorolac (TORADOL) 60 mg/2 mL Soln; Inject 2 mLs (60 mg total) into the muscle every 30 days.  Dispense: 6 mL; Refill: 3  -     MARTI; Future; Expected date: 07/18/2017  -     Anti Sm/RNP Antibody; Future; Expected date: 07/18/2017  -     Anti-DNA antibody, double-stranded; Future; Expected date: 07/18/2017  -     Sjogrens syndrome-A extractable nuclear antibody; Future; Expected date: 07/18/2017  -     Sjogrens syndrome-B extractable nuclear antibody; Future; Expected date: 07/18/2017  -     Anti-scleroderma antibody; Future; Expected date: 07/18/2017  -     Anti-Smith antibody; Future; Expected date: 07/18/2017  -     Vitamin D; Future; Expected date: 07/18/2017  -     C-reactive protein; Future; Expected date: 07/18/2017  -     Sedimentation rate, manual; Future; Expected date: 07/18/2017  -     CBC auto differential; Future; Expected date: 07/18/2017  -     Comprehensive metabolic panel; Future; Expected date: 07/18/2017    Lumbar and sacral arthritis  -     methylPREDNISolone acetate (DEPO-MEDROL) 80 mg/mL injection; Inject 2 mLs (160 mg total) into the muscle every 30 days.  Dispense: 6 mL; Refill: 3  -     MARTI; Future; Expected date: 07/18/2017  -     Anti Sm/RNP Antibody; Future; Expected date: 07/18/2017  -     Anti-DNA antibody, double-stranded; Future; Expected date: 07/18/2017  -     Sjogrens syndrome-A extractable nuclear antibody; Future;  Expected date: 07/18/2017  -     Sjogrens syndrome-B extractable nuclear antibody; Future; Expected date: 07/18/2017  -     Anti-scleroderma antibody; Future; Expected date: 07/18/2017  -     Anti-Smith antibody; Future; Expected date: 07/18/2017  -     Vitamin D; Future; Expected date: 07/18/2017  -     C-reactive protein; Future; Expected date: 07/18/2017  -     Sedimentation rate, manual; Future; Expected date: 07/18/2017  -     CBC auto differential; Future; Expected date: 07/18/2017  -     Comprehensive metabolic panel; Future; Expected date: 07/18/2017    Disability examination

## 2017-07-19 NOTE — PROGRESS NOTES
Subjective:      Chief Complaint   Patient presents with    Heel Pain     Left    Other     PCP:  Frandy Chacon  7/10/17       HPI: Patient presents to the clinic c/o left heel pain which is worse after a few steps after getting out of bed or resting.  Pain is described as sharp and along the plantar inner aspect of the foot. She is a patient of Dr. Moctezuma being treated for SLE, MCTD, Sjogren's, fibromyalgia, LS athritis, in an active flare, does get Im depomedrol 80 mg, toradol monthly, also takes plaquenil, tylenol 3. Wears tennis shoes.  Pain is worse in am when stepping down first in am.    Past Medical History:   Diagnosis Date    Acid reflux     Anxiety     Arthritis     Degenerative disc disease     Depression     Dry eyes     Dry mouth     Hypertension          Review of Systems  Constitution: Negative for chills, fever, weakness and malaise/fatigue.   HEENT: Negative for headaches.   Cardiovascular: Negative for chest pain and claudication.   Respiratory: Negative for cough and shortness of breath.   Musculoskeletal: Positive for foot pain.  Negative for muscle cramps and muscle weakness.   Gastrointestinal: Negative for nausea and vomiting.   Neurological: Negative for numbness and paresthesias.   Dermatological: Negative for wound.    .me        Objective:      Physical Exam  Constitutional:  Patient is oriented to person, place, and time. Vital signs are normal.  Appears well-developed and well-nourished.     Vascular:  Dorsalis pedis pulses are 2+ on the right side, and 2+ on the left side.   Posterior tibial pulses are 2+ on the right side, and 2+ on the left side.   + digital hair growth, no swelling, capillary fill time to all toes <3 seconds    Skin/Dermatological:  Skin is warm and intact. No rash noted. No cyanosis. no clubbing. No open wounds.      Musculoskeletal:      Normal range of motion bilateral ankle and pedal joints except ankle joint dorsiflexion limited to 5 degrees with knee  extended and flexed, no swelling or deformity, no tenderness or crepitus. Achilles tendon exhibits no pain or defects.   Tenderness to the medial calcaneal tuberclesleft foot , - tinel's sign, negative heel squeeze test.  Overall, pes cavus architecture with tight plantar fascia.       Neurological:  Normal strength lower extremity muscles, normal tone.   Reflex Scores:       Patellar reflexes are 2+ on the right side and 2+ on the left side.       Achilles reflexes are 2+ on the right side and 2+ on the left side.       No deficits in 2 point tactile discrimination, vibratory, light touch or pressure            Assessment:       1. Fibromyalgia    2. Lumbar and sacral osteoarthritis    3. SLE (systemic lupus erythematosus related syndrome)    4. Sjogren's syndrome, with unspecified organ involvement    5. Plantar fasciitis        Plan:       Fibromyalgia    Lumbar and sacral osteoarthritis    SLE (systemic lupus erythematosus related syndrome)    Sjogren's syndrome, with unspecified organ involvement    Plantar fasciitis  -     dexamethasone injection 4 mg; Inject 1 mL (4 mg total) into the muscle one time.  -     triamcinolone acetonide injection 12 mg; Inject 0.3 mLs (12 mg total) into the muscle one time.       left foot:  We discussed the etiology of the problem and the patient was given literature regarding plantar fasciitis and stretching.  With the patient's permission, an injection of 12 mg of kenalog, 4 mg of dexamethasone phosphate and 1 cc of lidocaine 1% and 1 cc of marcaine 0.5% into the left medial plantar heel.  Patient tolerated well. We also discussed proper shoe gear.  Patient was dispensed an accommodative off-the-shelf insert.  Spenco orthotic supports were also recommended.  We discussed the possibility of another injection or physical therapy or surgery should this not resolve the problem.  Return to clinic 6 weeks.

## 2017-11-08 ENCOUNTER — TELEPHONE (OUTPATIENT)
Dept: RHEUMATOLOGY | Facility: CLINIC | Age: 62
End: 2017-11-08

## 2017-11-08 NOTE — TELEPHONE ENCOUNTER
----- Message from Faith Whittington RT sent at 11/8/2017 10:59 AM CST -----  Contact: pt    pt , requesting medication Z pack to be called in she is very congested, thanks.

## 2018-01-24 ENCOUNTER — OFFICE VISIT (OUTPATIENT)
Dept: RHEUMATOLOGY | Facility: CLINIC | Age: 63
End: 2018-01-24
Payer: MEDICARE

## 2018-01-24 VITALS
WEIGHT: 205.25 LBS | BODY MASS INDEX: 34.16 KG/M2 | SYSTOLIC BLOOD PRESSURE: 175 MMHG | DIASTOLIC BLOOD PRESSURE: 95 MMHG

## 2018-01-24 DIAGNOSIS — B37.9 CANDIDA ALBICANS INFECTION: ICD-10-CM

## 2018-01-24 DIAGNOSIS — G47.00 INSOMNIA, UNSPECIFIED TYPE: ICD-10-CM

## 2018-01-24 DIAGNOSIS — I10 ESSENTIAL HYPERTENSION: ICD-10-CM

## 2018-01-24 DIAGNOSIS — M32.9 SYSTEMIC LUPUS ERYTHEMATOSUS, UNSPECIFIED SLE TYPE, UNSPECIFIED ORGAN INVOLVEMENT STATUS: ICD-10-CM

## 2018-01-24 DIAGNOSIS — M79.7 FIBROMYALGIA: ICD-10-CM

## 2018-01-24 DIAGNOSIS — M47.817 LUMBAR AND SACRAL OSTEOARTHRITIS: ICD-10-CM

## 2018-01-24 DIAGNOSIS — M35.1 MIXED CONNECTIVE TISSUE DISEASE: ICD-10-CM

## 2018-01-24 DIAGNOSIS — R09.81 SINUS CONGESTION: ICD-10-CM

## 2018-01-24 DIAGNOSIS — M35.00 SJOGREN'S SYNDROME, WITH UNSPECIFIED ORGAN INVOLVEMENT: ICD-10-CM

## 2018-01-24 PROCEDURE — 99213 OFFICE O/P EST LOW 20 MIN: CPT | Mod: PBBFAC,PO,25 | Performed by: INTERNAL MEDICINE

## 2018-01-24 PROCEDURE — 96372 THER/PROPH/DIAG INJ SC/IM: CPT | Mod: PBBFAC,PO

## 2018-01-24 PROCEDURE — 99999 PR PBB SHADOW E&M-EST. PATIENT-LVL III: CPT | Mod: PBBFAC,,, | Performed by: INTERNAL MEDICINE

## 2018-01-24 PROCEDURE — 99214 OFFICE O/P EST MOD 30 MIN: CPT | Mod: 25,S$PBB,, | Performed by: INTERNAL MEDICINE

## 2018-01-24 RX ORDER — ESTRADIOL 1 MG/1
1 TABLET ORAL DAILY
Qty: 30 TABLET | Refills: 3 | Status: SHIPPED | OUTPATIENT
Start: 2018-01-24 | End: 2018-08-14 | Stop reason: SDUPTHER

## 2018-01-24 RX ORDER — CEFTRIAXONE 1 G/1
1 INJECTION, POWDER, FOR SOLUTION INTRAMUSCULAR; INTRAVENOUS
Status: COMPLETED | OUTPATIENT
Start: 2018-01-24 | End: 2018-01-24

## 2018-01-24 RX ORDER — CYANOCOBALAMIN 1000 UG/ML
1000 INJECTION, SOLUTION INTRAMUSCULAR; SUBCUTANEOUS
Qty: 30 ML | Refills: 3 | Status: SHIPPED | OUTPATIENT
Start: 2018-01-24 | End: 2018-06-15 | Stop reason: SDUPTHER

## 2018-01-24 RX ORDER — FLUCONAZOLE 150 MG/1
TABLET ORAL
COMMUNITY
Start: 2018-01-12 | End: 2018-01-24 | Stop reason: SDUPTHER

## 2018-01-24 RX ORDER — GABAPENTIN 100 MG/1
100 CAPSULE ORAL 3 TIMES DAILY
Qty: 270 CAPSULE | Refills: 3 | Status: SHIPPED | OUTPATIENT
Start: 2018-01-24 | End: 2018-12-14 | Stop reason: SDUPTHER

## 2018-01-24 RX ORDER — KETOROLAC TROMETHAMINE 30 MG/ML
INJECTION, SOLUTION INTRAMUSCULAR; INTRAVENOUS
COMMUNITY
Start: 2017-11-29 | End: 2018-01-24 | Stop reason: SDUPTHER

## 2018-01-24 RX ORDER — METHYLPREDNISOLONE ACETATE 80 MG/ML
160 INJECTION, SUSPENSION INTRA-ARTICULAR; INTRALESIONAL; INTRAMUSCULAR; SOFT TISSUE
Qty: 6 ML | Refills: 3 | Status: SHIPPED | OUTPATIENT
Start: 2018-01-24 | End: 2018-06-15 | Stop reason: SDUPTHER

## 2018-01-24 RX ORDER — SYRINGE REUSABLE, 3 ML
1 SYRINGE, REUSABLE MISCELLANEOUS DAILY
Qty: 100 EACH | Refills: 3 | Status: SHIPPED | OUTPATIENT
Start: 2018-01-24 | End: 2018-06-15 | Stop reason: SDUPTHER

## 2018-01-24 RX ORDER — ESCITALOPRAM OXALATE 20 MG/1
20 TABLET ORAL DAILY
Qty: 90 TABLET | Refills: 3 | Status: SHIPPED | OUTPATIENT
Start: 2018-01-24 | End: 2018-12-14 | Stop reason: SDUPTHER

## 2018-01-24 RX ORDER — FLUCONAZOLE 150 MG/1
150 TABLET ORAL DAILY
Qty: 5 TABLET | Refills: 2 | Status: SHIPPED | OUTPATIENT
Start: 2018-01-24 | End: 2018-04-04 | Stop reason: SDUPTHER

## 2018-01-24 RX ORDER — HYDROXYCHLOROQUINE SULFATE 200 MG/1
200 TABLET, FILM COATED ORAL 2 TIMES DAILY
Qty: 180 TABLET | Refills: 3 | Status: SHIPPED | OUTPATIENT
Start: 2018-01-24 | End: 2018-12-14 | Stop reason: SDUPTHER

## 2018-01-24 RX ORDER — MUPIROCIN 20 MG/G
OINTMENT TOPICAL 3 TIMES DAILY
Qty: 22 G | Refills: 4 | Status: SHIPPED | OUTPATIENT
Start: 2018-01-24 | End: 2018-01-24 | Stop reason: SDUPTHER

## 2018-01-24 RX ORDER — METOPROLOL TARTRATE 100 MG/1
100 TABLET ORAL 2 TIMES DAILY
Qty: 180 TABLET | Refills: 3 | Status: SHIPPED | OUTPATIENT
Start: 2018-01-24 | End: 2018-12-14 | Stop reason: SDUPTHER

## 2018-01-24 RX ORDER — HYDROCHLOROTHIAZIDE 12.5 MG/1
CAPSULE ORAL
Qty: 30 CAPSULE | Refills: 3 | Status: SHIPPED | OUTPATIENT
Start: 2018-01-24 | End: 2018-08-14 | Stop reason: SDUPTHER

## 2018-01-24 RX ORDER — KETOROLAC TROMETHAMINE 30 MG/ML
60 INJECTION, SOLUTION INTRAMUSCULAR; INTRAVENOUS
Qty: 2 ML | Refills: 8 | Status: SHIPPED | OUTPATIENT
Start: 2018-01-24 | End: 2018-06-15 | Stop reason: SDUPTHER

## 2018-01-24 RX ORDER — ACETAMINOPHEN AND PHENYLEPHRINE HCL 325; 5 MG/1; MG/1
10000 TABLET ORAL DAILY
COMMUNITY

## 2018-01-24 RX ORDER — TEMAZEPAM 15 MG/1
15 CAPSULE ORAL NIGHTLY PRN
Qty: 30 CAPSULE | Refills: 0 | Status: SHIPPED | OUTPATIENT
Start: 2018-01-24 | End: 2018-02-23

## 2018-01-24 RX ORDER — ACETAMINOPHEN AND CODEINE PHOSPHATE 300; 30 MG/1; MG/1
1 TABLET ORAL 3 TIMES DAILY PRN
Qty: 90 TABLET | Refills: 3 | Status: SHIPPED | OUTPATIENT
Start: 2018-01-24 | End: 2018-06-15 | Stop reason: SDUPTHER

## 2018-01-24 RX ORDER — SULFAMETHOXAZOLE AND TRIMETHOPRIM 800; 160 MG/1; MG/1
1 TABLET ORAL 2 TIMES DAILY
Qty: 20 TABLET | Refills: 0 | Status: SHIPPED | OUTPATIENT
Start: 2018-01-24 | End: 2018-01-24 | Stop reason: SDUPTHER

## 2018-01-24 RX ADMIN — CEFTRIAXONE SODIUM 1 G: 1 INJECTION, POWDER, FOR SOLUTION INTRAMUSCULAR; INTRAVENOUS at 01:01

## 2018-01-24 ASSESSMENT — ROUTINE ASSESSMENT OF PATIENT INDEX DATA (RAPID3)
MDHAQ FUNCTION SCORE: 1.5
PAIN SCORE: 8
TOTAL RAPID3 SCORE: 5.33
PATIENT GLOBAL ASSESSMENT SCORE: 3
WHEN YOU AWAKENED IN THE MORNING OVER THE LAST WEEK, PLEASE INDICATE THE AMOUNT OF TIME IT TAKES UNTIL YOU ARE AS LIMBER AS YOU WILL BE FOR THE DAY: ONE HOUR AFTER WAKING
PSYCHOLOGICAL DISTRESS SCORE: 3.3
AM STIFFNESS SCORE: 1, YES
FATIGUE SCORE: 5

## 2018-01-24 NOTE — TELEPHONE ENCOUNTER
----- Message from Katelyn Buckley sent at 1/24/2018  4:25 PM CST -----  Contact: self  Patient is at the pharmacy calling regarding the antibiotic mupirocin (BACTROBAN) 2 % ointment and sulfamethoxazole-trimethoprim 800-160mg (BACTRIM DS) 800-160 mg Tab. They were not at the pharmacy to . Please call patient at [phone] when sent. Thanks!  [facility] [place] [facility] [place] - [address] [id number] [facility] [place] [address]  Phone: [phone] Fax: [phone]

## 2018-01-24 NOTE — PROGRESS NOTES
Subjective:       Patient ID: Angelica Chan is a 62 y.o. female.    Chief Complaint: Follow-up    Follow up:   Lupus  And had a recent surgery two nuñez neuromas. Her incision looks angry slightly red Patient complains of arthralgias and myalgias for which has been present for a few years. Pain is located in multiple joints, both shoulder(s), both elbow(s), both wrist(s), both MCP(s): 1st, 2nd, 3rd, 4th and 5th, both PIP(s): 1st, 2nd, 3rd, 4th and 5th, both DIP(s): 1st and 2nd, both hip(s), both knee(s) and both MTP(s): 1st, 2nd, 3rd, 4th and 5th, is described as aching, pulsating, shooting and throbbing, and is constant, moderate .  Associated symptoms include: crepitation, decreased range of motion, edema, effusion, tenderness and warmth.  She is in a flare      Review of Systems   Constitutional: Positive for activity change and chills. Negative for appetite change, diaphoresis and unexpected weight change.   HENT: Negative for congestion, dental problem, ear discharge, ear pain, facial swelling, mouth sores, nosebleeds, postnasal drip, rhinorrhea, sinus pressure, sneezing, sore throat, tinnitus and voice change.    Eyes: Negative for photophobia, pain, discharge, redness and itching.   Respiratory: Negative for apnea, cough, chest tightness, shortness of breath and wheezing.    Cardiovascular: Positive for leg swelling. Negative for chest pain and palpitations.   Gastrointestinal: Positive for abdominal pain. Negative for abdominal distention, constipation, diarrhea, nausea and vomiting.   Endocrine: Negative for cold intolerance, heat intolerance, polydipsia and polyuria.   Genitourinary: Negative for decreased urine volume, difficulty urinating, flank pain, frequency, hematuria and urgency.   Musculoskeletal: Positive for arthralgias, back pain, gait problem, joint swelling, neck pain and neck stiffness.   Skin: Negative for pallor, rash and wound.        Drainage of incision   Allergic/Immunologic: Negative  for immunocompromised state.   Neurological: Positive for weakness. Negative for dizziness, tremors and numbness.   Hematological: Negative for adenopathy. Does not bruise/bleed easily.   Psychiatric/Behavioral: Negative for sleep disturbance. The patient is not nervous/anxious.          Objective:     There were no vitals taken for this visit.     Physical Exam   Vitals reviewed.  Constitutional: She is oriented to person, place, and time. No distress.   HENT:   Head: Normocephalic and atraumatic.   Mouth thrush   Eyes: EOM are normal. Pupils are equal, round, and reactive to light. Right eye exhibits no discharge. Left eye exhibits no discharge.   Neck: Neck supple. No thyromegaly present.   Cardiovascular: Normal rate, regular rhythm and normal heart sounds.  Exam reveals no gallop and no friction rub.    No murmur heard.  Pulmonary/Chest: Breath sounds normal. She has no wheezes. She has no rales. She exhibits no tenderness.   Abdominal: There is no tenderness. There is no rebound and no guarding.       Right Side Rheumatological Exam     Examination finds the elbow normal.    The patient is tender to palpation of the shoulder, wrist, knee, 1st PIP, 1st MCP, 2nd PIP, 2nd MCP, 3rd PIP, 3rd MCP, 4th PIP, 4th MCP, 5th PIP and 5th MCP    She has swelling of the 1st PIP, 1st MCP, 2nd PIP, 2nd MCP, 3rd PIP, 3rd MCP, 4th PIP, 4th MCP, 5th PIP and 5th MCP    Shoulder Exam   Tenderness Location: no tenderness    Range of Motion   Active Abduction: abnormal   Adduction: abnormal  Sensation: normal    Knee Exam   Patellofemoral Crepitus: positive  Effusion: positive  Sensation: normal    Hip Exam   Tenderness Location: posterior  Sensation: normal    Elbow/Wrist Exam   Tenderness Location: no tenderness  Sensation: normal    Left Side Rheumatological Exam     The patient is tender to palpation of the shoulder, elbow, wrist, knee, 1st PIP, 1st MCP, 2nd PIP, 2nd MCP, 3rd PIP, 3rd MCP, 4th PIP, 4th MCP, 5th PIP and 5th  MCP.    She has swelling of the 1st PIP, 1st MCP, 2nd PIP, 2nd MCP, 3rd PIP, 3rd MCP, 4th PIP, 4th MCP, 5th PIP and 5th MCP    Shoulder Exam   Tenderness Location: no tenderness    Range of Motion   Active Abduction: abnormal   Sensation: normal    Knee Exam     Patellofemoral Crepitus: positive  Effusion: positive  Sensation: normal    Hip Exam   Tenderness Location: posterior  Sensation: normal    Elbow/Wrist Exam   Sensation: normal      Back/Neck Exam   General Inspection   Gait: normal         Lymphadenopathy:     She has no cervical adenopathy.   Neurological: She is alert and oriented to person, place, and time. Gait normal.   Skin: Skin is dry. No rash noted. No erythema. No pallor.     Incision and drainage   Psychiatric: Mood and affect normal.   Musculoskeletal: She exhibits tenderness and deformity. She exhibits no edema.      jhonny 2.0 normal less  crp 4.0, cbc, cmp  Assessment:       .  Encounter Diagnoses   Name Primary?    Incisional infection, initial encounter Yes    Systemic lupus erythematosus, unspecified SLE type, unspecified organ involvement status     Fibromyalgia     Lumbar and sacral osteoarthritis     Sinus congestion     Mixed connective tissue disease     Essential hypertension     Candida albicans infection     Sjogren's syndrome, with unspecified organ involvement     Insomnia, unspecified type         Plan:     Incisional infection, initial encounter  -     Discontinue: mupirocin (BACTROBAN) 2 % ointment; Apply topically 3 (three) times daily.  Dispense: 22 g; Refill: 4  -     JHONNY; Future; Expected date: 01/24/2018  -     Sjogrens syndrome-A extractable nuclear antibody; Future; Expected date: 01/24/2018  -     Sjogrens syndrome-B extractable nuclear antibody; Future; Expected date: 01/24/2018  -     Sedimentation rate, manual; Future; Expected date: 01/24/2018  -     C-reactive protein; Future; Expected date: 01/24/2018  -     Comprehensive metabolic panel; Future; Expected  date: 01/24/2018  -     CBC auto differential; Future; Expected date: 01/24/2018    Systemic lupus erythematosus, unspecified SLE type, unspecified organ involvement status  -     acetaminophen-codeine 300-30mg (TYLENOL #3) 300-30 mg Tab; Take 1 tablet by mouth 3 (three) times daily as needed.  Dispense: 90 tablet; Refill: 3  -     cyanocobalamin 1,000 mcg/mL injection; Inject 1 mL (1,000 mcg total) into the skin every 7 days.  Dispense: 30 mL; Refill: 3  -     escitalopram oxalate (LEXAPRO) 20 MG tablet; Take 1 tablet (20 mg total) by mouth once daily.  Dispense: 90 tablet; Refill: 3  -     gabapentin (NEURONTIN) 100 MG capsule; Take 1 capsule (100 mg total) by mouth 3 (three) times daily.  Dispense: 270 capsule; Refill: 3  -     hydroxychloroquine (PLAQUENIL) 200 mg tablet; Take 1 tablet (200 mg total) by mouth 2 (two) times daily.  Dispense: 180 tablet; Refill: 3  -     methylPREDNISolone acetate (DEPO-MEDROL) 80 mg/mL injection; Inject 2 mLs (160 mg total) into the muscle every 30 days.  Dispense: 6 mL; Refill: 3  -     metoprolol tartrate (LOPRESSOR) 100 MG tablet; Take 1 tablet (100 mg total) by mouth 2 (two) times daily.  Dispense: 180 tablet; Refill: 3  -     syringe, reusable, 3 mL SyrR; 1 Syringe by Misc.(Non-Drug; Combo Route) route once daily. 25 G needle 1 1/2  length  Dispense: 100 each; Refill: 3  -     MARTI; Future; Expected date: 01/24/2018  -     Sjogrens syndrome-A extractable nuclear antibody; Future; Expected date: 01/24/2018  -     Sjogrens syndrome-B extractable nuclear antibody; Future; Expected date: 01/24/2018  -     Sedimentation rate, manual; Future; Expected date: 01/24/2018  -     C-reactive protein; Future; Expected date: 01/24/2018  -     Comprehensive metabolic panel; Future; Expected date: 01/24/2018  -     CBC auto differential; Future; Expected date: 01/24/2018    Fibromyalgia  -     acetaminophen-codeine 300-30mg (TYLENOL #3) 300-30 mg Tab; Take 1 tablet by mouth 3 (three) times  daily as needed.  Dispense: 90 tablet; Refill: 3  -     cyanocobalamin 1,000 mcg/mL injection; Inject 1 mL (1,000 mcg total) into the skin every 7 days.  Dispense: 30 mL; Refill: 3  -     escitalopram oxalate (LEXAPRO) 20 MG tablet; Take 1 tablet (20 mg total) by mouth once daily.  Dispense: 90 tablet; Refill: 3  -     gabapentin (NEURONTIN) 100 MG capsule; Take 1 capsule (100 mg total) by mouth 3 (three) times daily.  Dispense: 270 capsule; Refill: 3  -     hydroCHLOROthiazide (MICROZIDE) 12.5 mg capsule; Take 1 capsule(12.5 mg total) by mouth once daily.  Dispense: 30 capsule; Refill: 3  -     hydroxychloroquine (PLAQUENIL) 200 mg tablet; Take 1 tablet (200 mg total) by mouth 2 (two) times daily.  Dispense: 180 tablet; Refill: 3  -     methylPREDNISolone acetate (DEPO-MEDROL) 80 mg/mL injection; Inject 2 mLs (160 mg total) into the muscle every 30 days.  Dispense: 6 mL; Refill: 3  -     metoprolol tartrate (LOPRESSOR) 100 MG tablet; Take 1 tablet (100 mg total) by mouth 2 (two) times daily.  Dispense: 180 tablet; Refill: 3  -     syringe, reusable, 3 mL SyrR; 1 Syringe by Misc.(Non-Drug; Combo Route) route once daily. 25 G needle 1 1/2  length  Dispense: 100 each; Refill: 3  -     MARTI; Future; Expected date: 01/24/2018  -     Sjogrens syndrome-A extractable nuclear antibody; Future; Expected date: 01/24/2018  -     Sjogrens syndrome-B extractable nuclear antibody; Future; Expected date: 01/24/2018  -     Sedimentation rate, manual; Future; Expected date: 01/24/2018  -     C-reactive protein; Future; Expected date: 01/24/2018  -     Comprehensive metabolic panel; Future; Expected date: 01/24/2018  -     CBC auto differential; Future; Expected date: 01/24/2018    Lumbar and sacral osteoarthritis  -     acetaminophen-codeine 300-30mg (TYLENOL #3) 300-30 mg Tab; Take 1 tablet by mouth 3 (three) times daily as needed.  Dispense: 90 tablet; Refill: 3  -     escitalopram oxalate (LEXAPRO) 20 MG tablet; Take 1 tablet (20  mg total) by mouth once daily.  Dispense: 90 tablet; Refill: 3  -     gabapentin (NEURONTIN) 100 MG capsule; Take 1 capsule (100 mg total) by mouth 3 (three) times daily.  Dispense: 270 capsule; Refill: 3  -     hydroCHLOROthiazide (MICROZIDE) 12.5 mg capsule; Take 1 capsule(12.5 mg total) by mouth once daily.  Dispense: 30 capsule; Refill: 3  -     hydroxychloroquine (PLAQUENIL) 200 mg tablet; Take 1 tablet (200 mg total) by mouth 2 (two) times daily.  Dispense: 180 tablet; Refill: 3  -     metoprolol tartrate (LOPRESSOR) 100 MG tablet; Take 1 tablet (100 mg total) by mouth 2 (two) times daily.  Dispense: 180 tablet; Refill: 3  -     syringe, reusable, 3 mL SyrR; 1 Syringe by Misc.(Non-Drug; Combo Route) route once daily. 25 G needle 1 1/2  length  Dispense: 100 each; Refill: 3    Sinus congestion  -     cyanocobalamin 1,000 mcg/mL injection; Inject 1 mL (1,000 mcg total) into the skin every 7 days.  Dispense: 30 mL; Refill: 3  -     syringe, reusable, 3 mL SyrR; 1 Syringe by Misc.(Non-Drug; Combo Route) route once daily. 25 G needle 1 1/2  length  Dispense: 100 each; Refill: 3    Mixed connective tissue disease  -     escitalopram oxalate (LEXAPRO) 20 MG tablet; Take 1 tablet (20 mg total) by mouth once daily.  Dispense: 90 tablet; Refill: 3  -     hydroCHLOROthiazide (MICROZIDE) 12.5 mg capsule; Take 1 capsule(12.5 mg total) by mouth once daily.  Dispense: 30 capsule; Refill: 3    Essential hypertension  -     escitalopram oxalate (LEXAPRO) 20 MG tablet; Take 1 tablet (20 mg total) by mouth once daily.  Dispense: 90 tablet; Refill: 3  -     MARTI; Future; Expected date: 01/24/2018  -     Sjogrens syndrome-A extractable nuclear antibody; Future; Expected date: 01/24/2018  -     Sjogrens syndrome-B extractable nuclear antibody; Future; Expected date: 01/24/2018  -     Sedimentation rate, manual; Future; Expected date: 01/24/2018  -     C-reactive protein; Future; Expected date: 01/24/2018  -     Comprehensive  metabolic panel; Future; Expected date: 01/24/2018  -     CBC auto differential; Future; Expected date: 01/24/2018    Candida albicans infection  -     hydroCHLOROthiazide (MICROZIDE) 12.5 mg capsule; Take 1 capsule(12.5 mg total) by mouth once daily.  Dispense: 30 capsule; Refill: 3  -     syringe, reusable, 3 mL SyrR; 1 Syringe by Misc.(Non-Drug; Combo Route) route once daily. 25 G needle 1 1/2  length  Dispense: 100 each; Refill: 3    Sjogren's syndrome, with unspecified organ involvement  -     fluconazole (DIFLUCAN) 150 MG Tab; Take 1 tablet (150 mg total) by mouth once daily.  Dispense: 5 tablet; Refill: 2  -     acetaminophen-codeine 300-30mg (TYLENOL #3) 300-30 mg Tab; Take 1 tablet by mouth 3 (three) times daily as needed.  Dispense: 90 tablet; Refill: 3  -     cyanocobalamin 1,000 mcg/mL injection; Inject 1 mL (1,000 mcg total) into the skin every 7 days.  Dispense: 30 mL; Refill: 3  -     escitalopram oxalate (LEXAPRO) 20 MG tablet; Take 1 tablet (20 mg total) by mouth once daily.  Dispense: 90 tablet; Refill: 3  -     estradiol (ESTRACE) 1 MG tablet; Take 1 tablet (1 mg total) by mouth once daily.  Dispense: 30 tablet; Refill: 3  -     gabapentin (NEURONTIN) 100 MG capsule; Take 1 capsule (100 mg total) by mouth 3 (three) times daily.  Dispense: 270 capsule; Refill: 3  -     hydroCHLOROthiazide (MICROZIDE) 12.5 mg capsule; Take 1 capsule(12.5 mg total) by mouth once daily.  Dispense: 30 capsule; Refill: 3  -     hydroxychloroquine (PLAQUENIL) 200 mg tablet; Take 1 tablet (200 mg total) by mouth 2 (two) times daily.  Dispense: 180 tablet; Refill: 3  -     ketorolac (TORADOL) 60 mg/2 mL Soln; Inject 2 mLs (60 mg total) into the muscle every 6 weeks.  Dispense: 2 mL; Refill: 8  -     methylPREDNISolone acetate (DEPO-MEDROL) 80 mg/mL injection; Inject 2 mLs (160 mg total) into the muscle every 30 days.  Dispense: 6 mL; Refill: 3  -     metoprolol tartrate (LOPRESSOR) 100 MG tablet; Take 1 tablet (100 mg  "total) by mouth 2 (two) times daily.  Dispense: 180 tablet; Refill: 3  -     syringe, reusable, 3 mL SyrR; 1 Syringe by Misc.(Non-Drug; Combo Route) route once daily. 25 G needle 1 1/2  length  Dispense: 100 each; Refill: 3  -     needle, disp, 25 gauge 25 gauge x 1 1/2" Ndle; 1 each by Misc.(Non-Drug; Combo Route) route once a week.  Dispense: 100 each; Refill: 2  -     cefTRIAXone injection 1 g; Inject 1 g into the muscle one time.  -     MARTI; Future; Expected date: 01/24/2018  -     Sjogrens syndrome-A extractable nuclear antibody; Future; Expected date: 01/24/2018  -     Sjogrens syndrome-B extractable nuclear antibody; Future; Expected date: 01/24/2018  -     Sedimentation rate, manual; Future; Expected date: 01/24/2018  -     C-reactive protein; Future; Expected date: 01/24/2018  -     Comprehensive metabolic panel; Future; Expected date: 01/24/2018  -     CBC auto differential; Future; Expected date: 01/24/2018    Insomnia, unspecified type  -     temazepam (RESTORIL) 15 mg Cap; Take 1 capsule (15 mg total) by mouth nightly as needed.  Dispense: 30 capsule; Refill: 0  -     MARTI; Future; Expected date: 01/24/2018  -     Sjogrens syndrome-A extractable nuclear antibody; Future; Expected date: 01/24/2018  -     Sjogrens syndrome-B extractable nuclear antibody; Future; Expected date: 01/24/2018  -     Sedimentation rate, manual; Future; Expected date: 01/24/2018  -     C-reactive protein; Future; Expected date: 01/24/2018  -     Comprehensive metabolic panel; Future; Expected date: 01/24/2018  -     CBC auto differential; Future; Expected date: 01/24/2018    Other orders  -     Discontinue: sulfamethoxazole-trimethoprim 800-160mg (BACTRIM DS) 800-160 mg Tab; Take 1 tablet by mouth 2 (two) times daily.  Dispense: 20 tablet; Refill: 0        "

## 2018-01-24 NOTE — PROGRESS NOTES
Administered 1 gram of rocephin mixed with 2.1 ml of lidocaine. Given in the left upper outer gluteal. Informed of s/s to report verbalized understanding. No adverse reactions noted.    Lot # [id number]  Expiration 1 may 2020

## 2018-01-28 RX ORDER — SULFAMETHOXAZOLE AND TRIMETHOPRIM 800; 160 MG/1; MG/1
1 TABLET ORAL 2 TIMES DAILY
Qty: 20 TABLET | Refills: 0 | Status: SHIPPED | OUTPATIENT
Start: 2018-01-28 | End: 2018-02-07

## 2018-01-28 RX ORDER — MUPIROCIN 20 MG/G
OINTMENT TOPICAL 3 TIMES DAILY
Qty: 22 G | Refills: 4 | Status: SHIPPED | OUTPATIENT
Start: 2018-01-28 | End: 2018-02-07

## 2018-04-04 DIAGNOSIS — M32.9 LUPUS: ICD-10-CM

## 2018-04-04 DIAGNOSIS — M79.7 FIBROMYALGIA: ICD-10-CM

## 2018-04-04 DIAGNOSIS — M35.00 SJOGREN'S SYNDROME, WITH UNSPECIFIED ORGAN INVOLVEMENT: ICD-10-CM

## 2018-04-04 DIAGNOSIS — B37.0 THRUSH: ICD-10-CM

## 2018-04-04 DIAGNOSIS — M35.1 MIXED CONNECTIVE TISSUE DISEASE: ICD-10-CM

## 2018-04-04 RX ORDER — FLUCONAZOLE 150 MG/1
150 TABLET ORAL DAILY
Qty: 5 TABLET | Refills: 0 | OUTPATIENT
Start: 2018-04-04 | End: 2018-04-09

## 2018-04-04 RX ORDER — FLUCONAZOLE 150 MG/1
150 TABLET ORAL DAILY
Qty: 5 TABLET | Refills: 2 | Status: SHIPPED | OUTPATIENT
Start: 2018-04-04 | End: 2018-12-04

## 2018-06-15 ENCOUNTER — OFFICE VISIT (OUTPATIENT)
Dept: RHEUMATOLOGY | Facility: CLINIC | Age: 63
End: 2018-06-15
Payer: MEDICARE

## 2018-06-15 VITALS
SYSTOLIC BLOOD PRESSURE: 177 MMHG | HEART RATE: 60 BPM | DIASTOLIC BLOOD PRESSURE: 86 MMHG | WEIGHT: 204.13 LBS | BODY MASS INDEX: 34.01 KG/M2 | HEIGHT: 65 IN

## 2018-06-15 DIAGNOSIS — B37.9 CANDIDA ALBICANS INFECTION: ICD-10-CM

## 2018-06-15 DIAGNOSIS — M79.7 FIBROMYALGIA: ICD-10-CM

## 2018-06-15 DIAGNOSIS — M32.9 SYSTEMIC LUPUS ERYTHEMATOSUS, UNSPECIFIED SLE TYPE, UNSPECIFIED ORGAN INVOLVEMENT STATUS: Primary | ICD-10-CM

## 2018-06-15 DIAGNOSIS — R09.81 SINUS CONGESTION: ICD-10-CM

## 2018-06-15 DIAGNOSIS — M35.00 SJOGREN'S SYNDROME, WITH UNSPECIFIED ORGAN INVOLVEMENT: ICD-10-CM

## 2018-06-15 DIAGNOSIS — M47.817 LUMBAR AND SACRAL OSTEOARTHRITIS: ICD-10-CM

## 2018-06-15 PROCEDURE — 99213 OFFICE O/P EST LOW 20 MIN: CPT | Mod: PBBFAC,PO | Performed by: INTERNAL MEDICINE

## 2018-06-15 PROCEDURE — 99999 PR PBB SHADOW E&M-EST. PATIENT-LVL III: CPT | Mod: PBBFAC,,, | Performed by: INTERNAL MEDICINE

## 2018-06-15 PROCEDURE — 99214 OFFICE O/P EST MOD 30 MIN: CPT | Mod: S$PBB,,, | Performed by: INTERNAL MEDICINE

## 2018-06-15 RX ORDER — METHYLPREDNISOLONE ACETATE 80 MG/ML
160 INJECTION, SUSPENSION INTRA-ARTICULAR; INTRALESIONAL; INTRAMUSCULAR; SOFT TISSUE
Qty: 6 ML | Refills: 3 | Status: SHIPPED | OUTPATIENT
Start: 2018-06-15 | End: 2018-12-04 | Stop reason: SDUPTHER

## 2018-06-15 RX ORDER — KETOROLAC TROMETHAMINE 30 MG/ML
60 INJECTION, SOLUTION INTRAMUSCULAR; INTRAVENOUS
Qty: 2 ML | Refills: 8 | Status: SHIPPED | OUTPATIENT
Start: 2018-06-15 | End: 2018-06-15 | Stop reason: SDUPTHER

## 2018-06-15 RX ORDER — VITAMIN E 268 MG
400 CAPSULE ORAL DAILY
COMMUNITY
End: 2019-06-07

## 2018-06-15 RX ORDER — LISINOPRIL 20 MG/1
TABLET ORAL
COMMUNITY
End: 2018-12-04

## 2018-06-15 RX ORDER — KETOROLAC TROMETHAMINE 30 MG/ML
60 INJECTION, SOLUTION INTRAMUSCULAR; INTRAVENOUS
Qty: 2 ML | Refills: 8 | Status: SHIPPED | OUTPATIENT
Start: 2018-06-15 | End: 2018-12-04 | Stop reason: SDUPTHER

## 2018-06-15 RX ORDER — METHYLPREDNISOLONE ACETATE 80 MG/ML
160 INJECTION, SUSPENSION INTRA-ARTICULAR; INTRALESIONAL; INTRAMUSCULAR; SOFT TISSUE
Qty: 6 ML | Refills: 3 | Status: SHIPPED | OUTPATIENT
Start: 2018-06-15 | End: 2018-06-15 | Stop reason: SDUPTHER

## 2018-06-15 RX ORDER — ACETAMINOPHEN AND CODEINE PHOSPHATE 300; 30 MG/1; MG/1
1 TABLET ORAL 3 TIMES DAILY PRN
Qty: 90 TABLET | Refills: 3 | Status: SHIPPED | OUTPATIENT
Start: 2018-06-15 | End: 2018-12-04 | Stop reason: SDUPTHER

## 2018-06-15 RX ORDER — SYRINGE REUSABLE, 3 ML
1 SYRINGE, REUSABLE MISCELLANEOUS DAILY
Qty: 100 EACH | Refills: 3 | Status: SHIPPED | OUTPATIENT
Start: 2018-06-15 | End: 2019-06-07 | Stop reason: SDUPTHER

## 2018-06-15 NOTE — PROGRESS NOTES
Subjective:       Patient ID: Angelica Chan is a 62 y.o. female.    Chief Complaint: Disease Management and Pain    Follow up:   Lupus and sjogren's syndrome, Patient complains of arthralgias and myalgias for which has been present for a few years. Pain is located in multiple joints, both shoulder(s), both elbow(s), both wrist(s), both MCP(s): 1st, 2nd, 3rd, 4th and 5th, both PIP(s): 1st, 2nd, 3rd, 4th and 5th, both DIP(s): 1st and 2nd, both hip(s), both knee(s) and both MTP(s): 1st, 2nd, 3rd, 4th and 5th, is described as aching, pulsating, shooting and throbbing, and is constant, moderate .  Associated symptoms include: crepitation, decreased range of motion, edema, effusion, tenderness and warmth.       Review of Systems   Constitutional: Positive for activity change and chills. Negative for appetite change, diaphoresis and unexpected weight change.   HENT: Negative for congestion, dental problem, ear discharge, ear pain, facial swelling, mouth sores, nosebleeds, postnasal drip, rhinorrhea, sinus pressure, sneezing, sore throat, tinnitus and voice change.    Eyes: Negative for photophobia, pain, discharge, redness and itching.   Respiratory: Negative for apnea, cough, chest tightness, shortness of breath and wheezing.    Cardiovascular: Positive for leg swelling. Negative for chest pain and palpitations.   Gastrointestinal: Positive for abdominal pain. Negative for abdominal distention, constipation, diarrhea, nausea and vomiting.   Endocrine: Negative for cold intolerance, heat intolerance, polydipsia and polyuria.   Genitourinary: Negative for decreased urine volume, difficulty urinating, flank pain, frequency, hematuria and urgency.   Musculoskeletal: Positive for arthralgias, back pain, gait problem, joint swelling, neck pain and neck stiffness.   Skin: Negative for pallor, rash and wound.        Drainage of incision   Allergic/Immunologic: Negative for immunocompromised state.   Neurological: Positive for  "weakness. Negative for dizziness, tremors and numbness.   Hematological: Negative for adenopathy. Does not bruise/bleed easily.   Psychiatric/Behavioral: Negative for sleep disturbance. The patient is not nervous/anxious.          Objective:     BP (!) 177/86   Pulse 60   Ht 5' 5" (1.651 m)   Wt 92.6 kg (204 lb 2.3 oz)   BMI 33.97 kg/m²      Physical Exam   Vitals reviewed.  Constitutional: She is oriented to person, place, and time. No distress.   HENT:   Head: Normocephalic and atraumatic.   Mouth thrush   Eyes: EOM are normal. Pupils are equal, round, and reactive to light. Right eye exhibits no discharge. Left eye exhibits no discharge.   Neck: Neck supple. No thyromegaly present.   Cardiovascular: Normal rate, regular rhythm and normal heart sounds.  Exam reveals no gallop and no friction rub.    No murmur heard.  Pulmonary/Chest: Breath sounds normal. She has no wheezes. She has no rales. She exhibits no tenderness.   Abdominal: There is no tenderness. There is no rebound and no guarding.       Right Side Rheumatological Exam     Examination finds the elbow normal.    The patient is tender to palpation of the shoulder, wrist, knee, 1st PIP, 1st MCP, 2nd PIP, 2nd MCP, 3rd PIP, 3rd MCP, 4th PIP, 4th MCP, 5th PIP and 5th MCP    She has swelling of the 1st PIP, 1st MCP, 2nd PIP, 2nd MCP, 3rd PIP, 3rd MCP, 4th PIP, 4th MCP, 5th PIP and 5th MCP    Shoulder Exam   Tenderness Location: no tenderness    Range of Motion   Active Abduction: abnormal   Adduction: abnormal  Sensation: normal    Knee Exam   Patellofemoral Crepitus: positive  Effusion: positive  Sensation: normal    Hip Exam   Tenderness Location: posterior  Sensation: normal    Elbow/Wrist Exam   Tenderness Location: no tenderness  Sensation: normal    Left Side Rheumatological Exam     The patient is tender to palpation of the shoulder, elbow, wrist, knee, 1st PIP, 1st MCP, 2nd PIP, 2nd MCP, 3rd PIP, 3rd MCP, 4th PIP, 4th MCP, 5th PIP and 5th " MCP.    She has swelling of the 1st PIP, 1st MCP, 2nd PIP, 2nd MCP, 3rd PIP, 3rd MCP, 4th PIP, 4th MCP, 5th PIP and 5th MCP    Shoulder Exam   Tenderness Location: no tenderness    Range of Motion   Active Abduction: abnormal   Sensation: normal    Knee Exam     Patellofemoral Crepitus: positive  Effusion: positive  Sensation: normal    Hip Exam   Tenderness Location: posterior  Sensation: normal    Elbow/Wrist Exam   Sensation: normal      Back/Neck Exam   General Inspection   Gait: normal         Lymphadenopathy:     She has no cervical adenopathy.   Neurological: She is alert and oriented to person, place, and time. Gait normal.   Skin: Skin is dry. No rash noted. No erythema. No pallor.     Incision and drainage   Psychiatric: Mood and affect normal.   Musculoskeletal: She exhibits tenderness and deformity. She exhibits no edema.      jhonny 2.0 normal less  crp 4.0, cbc, cmp  Assessment:       .  Encounter Diagnoses   Name Primary?    Systemic lupus erythematosus, unspecified SLE type, unspecified organ involvement status Yes    Fibromyalgia     Lumbar and sacral osteoarthritis     Sjogren's syndrome, with unspecified organ involvement         Plan:     Systemic lupus erythematosus, unspecified SLE type, unspecified organ involvement status  -     CBC auto differential; Future; Expected date: 06/15/2018  -     Comprehensive metabolic panel; Future; Expected date: 06/15/2018  -     C-reactive protein; Future; Expected date: 06/15/2018  -     Sedimentation rate; Future; Expected date: 06/15/2018  -     JHONNY; Future; Expected date: 06/15/2018  -     Anti Sm/RNP Antibody; Future; Expected date: 06/15/2018  -     Anti-DNA antibody, double-stranded; Future; Expected date: 06/15/2018  -     Anti-Histone Antibody; Future; Expected date: 06/15/2018  -     Anti-scleroderma antibody; Future; Expected date: 06/15/2018  -     Anti-Smith antibody; Future; Expected date: 06/15/2018  -     Sjogrens syndrome-A extractable  nuclear antibody; Future; Expected date: 06/15/2018  -     Sjogrens syndrome-B extractable nuclear antibody; Future; Expected date: 06/15/2018  -     Hepatitis B surface antibody; Future; Expected date: 06/15/2018  -     Hepatitis C antibody; Future; Expected date: 06/15/2018  -     Hepatitis B core antibody, IgM; Future; Expected date: 06/15/2018  -     acetaminophen-codeine 300-30mg (TYLENOL #3) 300-30 mg Tab; Take 1 tablet by mouth 3 (three) times daily as needed.  Dispense: 90 tablet; Refill: 3  -     CBC auto differential; Future; Expected date: 06/15/2018  -     Comprehensive metabolic panel; Future; Expected date: 06/15/2018  -     Sjogrens syndrome-A extractable nuclear antibody; Future; Expected date: 06/15/2018  -     Sjogrens syndrome-B extractable nuclear antibody; Future; Expected date: 06/15/2018  -     Sedimentation rate; Future; Expected date: 06/15/2018  -     C-reactive protein; Future; Expected date: 06/15/2018    Fibromyalgia  -     CBC auto differential; Future; Expected date: 06/15/2018  -     Comprehensive metabolic panel; Future; Expected date: 06/15/2018  -     C-reactive protein; Future; Expected date: 06/15/2018  -     Sedimentation rate; Future; Expected date: 06/15/2018  -     MARTI; Future; Expected date: 06/15/2018  -     Anti Sm/RNP Antibody; Future; Expected date: 06/15/2018  -     Anti-DNA antibody, double-stranded; Future; Expected date: 06/15/2018  -     Anti-Histone Antibody; Future; Expected date: 06/15/2018  -     Anti-scleroderma antibody; Future; Expected date: 06/15/2018  -     Anti-Smith antibody; Future; Expected date: 06/15/2018  -     Sjogrens syndrome-A extractable nuclear antibody; Future; Expected date: 06/15/2018  -     Sjogrens syndrome-B extractable nuclear antibody; Future; Expected date: 06/15/2018  -     Hepatitis B surface antibody; Future; Expected date: 06/15/2018  -     Hepatitis C antibody; Future; Expected date: 06/15/2018  -     Hepatitis B core antibody,  IgM; Future; Expected date: 06/15/2018  -     acetaminophen-codeine 300-30mg (TYLENOL #3) 300-30 mg Tab; Take 1 tablet by mouth 3 (three) times daily as needed.  Dispense: 90 tablet; Refill: 3  -     CBC auto differential; Future; Expected date: 06/15/2018  -     Comprehensive metabolic panel; Future; Expected date: 06/15/2018  -     Sjogrens syndrome-A extractable nuclear antibody; Future; Expected date: 06/15/2018  -     Sjogrens syndrome-B extractable nuclear antibody; Future; Expected date: 06/15/2018  -     Sedimentation rate; Future; Expected date: 06/15/2018  -     C-reactive protein; Future; Expected date: 06/15/2018    Lumbar and sacral osteoarthritis  -     CBC auto differential; Future; Expected date: 06/15/2018  -     Comprehensive metabolic panel; Future; Expected date: 06/15/2018  -     C-reactive protein; Future; Expected date: 06/15/2018  -     Sedimentation rate; Future; Expected date: 06/15/2018  -     MARTI; Future; Expected date: 06/15/2018  -     Anti Sm/RNP Antibody; Future; Expected date: 06/15/2018  -     Anti-DNA antibody, double-stranded; Future; Expected date: 06/15/2018  -     Anti-Histone Antibody; Future; Expected date: 06/15/2018  -     Anti-scleroderma antibody; Future; Expected date: 06/15/2018  -     Anti-Smith antibody; Future; Expected date: 06/15/2018  -     Sjogrens syndrome-A extractable nuclear antibody; Future; Expected date: 06/15/2018  -     Sjogrens syndrome-B extractable nuclear antibody; Future; Expected date: 06/15/2018  -     Hepatitis B surface antibody; Future; Expected date: 06/15/2018  -     Hepatitis C antibody; Future; Expected date: 06/15/2018  -     Hepatitis B core antibody, IgM; Future; Expected date: 06/15/2018  -     acetaminophen-codeine 300-30mg (TYLENOL #3) 300-30 mg Tab; Take 1 tablet by mouth 3 (three) times daily as needed.  Dispense: 90 tablet; Refill: 3  -     CBC auto differential; Future; Expected date: 06/15/2018  -     Comprehensive metabolic  panel; Future; Expected date: 06/15/2018  -     Sjogrens syndrome-A extractable nuclear antibody; Future; Expected date: 06/15/2018  -     Sjogrens syndrome-B extractable nuclear antibody; Future; Expected date: 06/15/2018  -     Sedimentation rate; Future; Expected date: 06/15/2018  -     C-reactive protein; Future; Expected date: 06/15/2018    Sjogren's syndrome, with unspecified organ involvement  -     CBC auto differential; Future; Expected date: 06/15/2018  -     Comprehensive metabolic panel; Future; Expected date: 06/15/2018  -     C-reactive protein; Future; Expected date: 06/15/2018  -     Sedimentation rate; Future; Expected date: 06/15/2018  -     MARTI; Future; Expected date: 06/15/2018  -     Anti Sm/RNP Antibody; Future; Expected date: 06/15/2018  -     Anti-DNA antibody, double-stranded; Future; Expected date: 06/15/2018  -     Anti-Histone Antibody; Future; Expected date: 06/15/2018  -     Anti-scleroderma antibody; Future; Expected date: 06/15/2018  -     Anti-Smith antibody; Future; Expected date: 06/15/2018  -     Sjogrens syndrome-A extractable nuclear antibody; Future; Expected date: 06/15/2018  -     Sjogrens syndrome-B extractable nuclear antibody; Future; Expected date: 06/15/2018  -     Hepatitis B surface antibody; Future; Expected date: 06/15/2018  -     Hepatitis C antibody; Future; Expected date: 06/15/2018  -     Hepatitis B core antibody, IgM; Future; Expected date: 06/15/2018  -     acetaminophen-codeine 300-30mg (TYLENOL #3) 300-30 mg Tab; Take 1 tablet by mouth 3 (three) times daily as needed.  Dispense: 90 tablet; Refill: 3  -     CBC auto differential; Future; Expected date: 06/15/2018  -     Comprehensive metabolic panel; Future; Expected date: 06/15/2018  -     Sjogrens syndrome-A extractable nuclear antibody; Future; Expected date: 06/15/2018  -     Sjogrens syndrome-B extractable nuclear antibody; Future; Expected date: 06/15/2018  -     Sedimentation rate; Future; Expected  date: 06/15/2018  -     C-reactive protein; Future; Expected date: 06/15/2018

## 2018-06-24 RX ORDER — CYANOCOBALAMIN 1000 UG/ML
1000 INJECTION, SOLUTION INTRAMUSCULAR; SUBCUTANEOUS
Qty: 30 ML | Refills: 3 | Status: SHIPPED | OUTPATIENT
Start: 2018-06-24 | End: 2018-12-04 | Stop reason: SDUPTHER

## 2018-08-14 DIAGNOSIS — B37.9 CANDIDA ALBICANS INFECTION: ICD-10-CM

## 2018-08-14 DIAGNOSIS — M47.817 LUMBAR AND SACRAL OSTEOARTHRITIS: ICD-10-CM

## 2018-08-14 DIAGNOSIS — M79.7 FIBROMYALGIA: ICD-10-CM

## 2018-08-14 DIAGNOSIS — M35.1 MIXED CONNECTIVE TISSUE DISEASE: ICD-10-CM

## 2018-08-14 DIAGNOSIS — M35.00 SJOGREN'S SYNDROME, WITH UNSPECIFIED ORGAN INVOLVEMENT: ICD-10-CM

## 2018-08-14 RX ORDER — MUPIROCIN 20 MG/G
OINTMENT TOPICAL
Qty: 44 G | Refills: 3 | Status: SHIPPED | OUTPATIENT
Start: 2018-08-14 | End: 2018-12-04

## 2018-08-14 RX ORDER — ESTRADIOL 1 MG/1
TABLET ORAL
Qty: 30 TABLET | Refills: 3 | Status: SHIPPED | OUTPATIENT
Start: 2018-08-14 | End: 2019-02-22 | Stop reason: SDUPTHER

## 2018-08-14 RX ORDER — HYDROCHLOROTHIAZIDE 12.5 MG/1
CAPSULE ORAL
Qty: 30 CAPSULE | Refills: 3 | Status: SHIPPED | OUTPATIENT
Start: 2018-08-14 | End: 2021-03-23 | Stop reason: SDUPTHER

## 2018-08-14 RX ORDER — SULFAMETHOXAZOLE AND TRIMETHOPRIM 800; 160 MG/1; MG/1
TABLET ORAL
Qty: 20 TABLET | Refills: 0 | Status: SHIPPED | OUTPATIENT
Start: 2018-08-14 | End: 2018-12-04

## 2018-08-14 NOTE — TELEPHONE ENCOUNTER
----- Message from Marina Ritchie sent at 8/14/2018 11:18 AM CDT -----  Contact: Patient  Type:  RX Refill Request    Who Called:  Patient  Refill or New Rx:  Refills  RX Name and Strength:    hydroCHLOROthiazide (MICROZIDE) 12.5 mg capsule  estradiol (ESTRACE) 1 MG tablet  Bactrim  Mutirocin 2%  How is the patient currently taking it? (ex. 1XDay):    Is this a 30 day or 90 day RX:    Preferred Pharmacy with phone number:    [facility] - [address]  [address]  [facility] [address]  Phone: [phone] Fax: [phone]  Local or Mail Order:  Mail  Ordering Provider:  Rhianna Meza Call Back Number:  [phone]  Additional Information:

## 2018-10-11 DIAGNOSIS — M35.00 SJOGREN'S SYNDROME, WITH UNSPECIFIED ORGAN INVOLVEMENT: ICD-10-CM

## 2018-10-13 RX ORDER — FLUCONAZOLE 150 MG/1
TABLET ORAL
Qty: 5 TABLET | Refills: 2 | Status: SHIPPED | OUTPATIENT
Start: 2018-10-13 | End: 2019-07-15 | Stop reason: SDUPTHER

## 2018-12-04 ENCOUNTER — OFFICE VISIT (OUTPATIENT)
Dept: RHEUMATOLOGY | Facility: CLINIC | Age: 63
End: 2018-12-04
Payer: MEDICARE

## 2018-12-04 VITALS
WEIGHT: 220.44 LBS | DIASTOLIC BLOOD PRESSURE: 90 MMHG | SYSTOLIC BLOOD PRESSURE: 140 MMHG | BODY MASS INDEX: 36.73 KG/M2 | HEART RATE: 71 BPM | HEIGHT: 65 IN

## 2018-12-04 DIAGNOSIS — M35.00 SJOGREN'S SYNDROME, WITH UNSPECIFIED ORGAN INVOLVEMENT: ICD-10-CM

## 2018-12-04 DIAGNOSIS — M47.817 LUMBAR AND SACRAL OSTEOARTHRITIS: ICD-10-CM

## 2018-12-04 DIAGNOSIS — M32.9 SYSTEMIC LUPUS ERYTHEMATOSUS, UNSPECIFIED SLE TYPE, UNSPECIFIED ORGAN INVOLVEMENT STATUS: ICD-10-CM

## 2018-12-04 DIAGNOSIS — M79.7 FIBROMYALGIA: ICD-10-CM

## 2018-12-04 DIAGNOSIS — M32.9 SYSTEMIC LUPUS ERYTHEMATOSUS, UNSPECIFIED SLE TYPE, UNSPECIFIED ORGAN INVOLVEMENT STATUS: Primary | ICD-10-CM

## 2018-12-04 DIAGNOSIS — R09.81 SINUS CONGESTION: ICD-10-CM

## 2018-12-04 PROCEDURE — 99214 OFFICE O/P EST MOD 30 MIN: CPT | Mod: PBBFAC,PO | Performed by: INTERNAL MEDICINE

## 2018-12-04 PROCEDURE — 99214 OFFICE O/P EST MOD 30 MIN: CPT | Mod: S$PBB,,, | Performed by: INTERNAL MEDICINE

## 2018-12-04 PROCEDURE — 99999 PR PBB SHADOW E&M-EST. PATIENT-LVL IV: CPT | Mod: PBBFAC,,, | Performed by: INTERNAL MEDICINE

## 2018-12-04 RX ORDER — IBUPROFEN 100 MG/5ML
1000 SUSPENSION, ORAL (FINAL DOSE FORM) ORAL DAILY
COMMUNITY
End: 2019-06-07

## 2018-12-04 RX ORDER — ACETAMINOPHEN AND CODEINE PHOSPHATE 300; 30 MG/1; MG/1
1 TABLET ORAL 3 TIMES DAILY PRN
Qty: 90 TABLET | Refills: 3 | Status: SHIPPED | OUTPATIENT
Start: 2018-12-04 | End: 2019-06-07

## 2018-12-04 RX ORDER — CYANOCOBALAMIN 1000 UG/ML
1000 INJECTION, SOLUTION INTRAMUSCULAR; SUBCUTANEOUS
Qty: 30 ML | Refills: 3 | Status: SHIPPED | OUTPATIENT
Start: 2018-12-04 | End: 2019-06-07 | Stop reason: SDUPTHER

## 2018-12-04 RX ORDER — POTASSIUM CHLORIDE 750 MG/1
10 TABLET, EXTENDED RELEASE ORAL DAILY
Qty: 30 TABLET | Refills: 3 | Status: SHIPPED | OUTPATIENT
Start: 2018-12-04 | End: 2018-12-21 | Stop reason: SDUPTHER

## 2018-12-04 RX ORDER — AMLODIPINE BESYLATE 5 MG/1
10 TABLET ORAL DAILY
COMMUNITY
Start: 2018-11-30 | End: 2019-06-07

## 2018-12-04 RX ORDER — METHYLPREDNISOLONE ACETATE 80 MG/ML
160 INJECTION, SUSPENSION INTRA-ARTICULAR; INTRALESIONAL; INTRAMUSCULAR; SOFT TISSUE
Qty: 6 ML | Refills: 3 | Status: SHIPPED | OUTPATIENT
Start: 2018-12-04 | End: 2019-06-07 | Stop reason: SDUPTHER

## 2018-12-04 RX ORDER — KETOROLAC TROMETHAMINE 30 MG/ML
60 INJECTION, SOLUTION INTRAMUSCULAR; INTRAVENOUS
Qty: 2 ML | Refills: 8 | Status: SHIPPED | OUTPATIENT
Start: 2018-12-04 | End: 2019-06-07 | Stop reason: SDUPTHER

## 2018-12-04 RX ORDER — CHOLECALCIFEROL (VITAMIN D3) 25 MCG
1000 TABLET ORAL DAILY
COMMUNITY
End: 2019-06-07

## 2018-12-04 RX ORDER — OLMESARTAN MEDOXOMIL 40 MG/1
40 TABLET ORAL DAILY
COMMUNITY
Start: 2018-11-29 | End: 2019-06-07

## 2018-12-04 ASSESSMENT — ROUTINE ASSESSMENT OF PATIENT INDEX DATA (RAPID3)
TOTAL RAPID3 SCORE: 4.44
PATIENT GLOBAL ASSESSMENT SCORE: 5
PAIN SCORE: 5
PSYCHOLOGICAL DISTRESS SCORE: 5.5
MDHAQ FUNCTION SCORE: 1

## 2018-12-14 DIAGNOSIS — M32.9 SYSTEMIC LUPUS ERYTHEMATOSUS, UNSPECIFIED SLE TYPE, UNSPECIFIED ORGAN INVOLVEMENT STATUS: ICD-10-CM

## 2018-12-14 DIAGNOSIS — M47.817 LUMBAR AND SACRAL OSTEOARTHRITIS: ICD-10-CM

## 2018-12-14 DIAGNOSIS — M35.00 SJOGREN'S SYNDROME, WITH UNSPECIFIED ORGAN INVOLVEMENT: ICD-10-CM

## 2018-12-14 DIAGNOSIS — I10 ESSENTIAL HYPERTENSION: ICD-10-CM

## 2018-12-14 DIAGNOSIS — M79.7 FIBROMYALGIA: ICD-10-CM

## 2018-12-14 DIAGNOSIS — M35.1 MIXED CONNECTIVE TISSUE DISEASE: ICD-10-CM

## 2018-12-14 RX ORDER — GABAPENTIN 100 MG/1
CAPSULE ORAL
Qty: 270 CAPSULE | Refills: 3 | Status: SHIPPED | OUTPATIENT
Start: 2018-12-14 | End: 2020-02-10 | Stop reason: SDUPTHER

## 2018-12-14 RX ORDER — ESCITALOPRAM OXALATE 20 MG/1
TABLET ORAL
Qty: 90 TABLET | Refills: 3 | Status: SHIPPED | OUTPATIENT
Start: 2018-12-14 | End: 2020-02-24 | Stop reason: SDUPTHER

## 2018-12-14 RX ORDER — METOPROLOL TARTRATE 100 MG/1
TABLET ORAL
Qty: 180 TABLET | Refills: 3 | Status: SHIPPED | OUTPATIENT
Start: 2018-12-14 | End: 2020-02-24 | Stop reason: SDUPTHER

## 2018-12-14 RX ORDER — ESTRADIOL 1 MG/1
TABLET ORAL
Qty: 30 TABLET | Refills: 3 | OUTPATIENT
Start: 2018-12-14

## 2018-12-14 RX ORDER — HYDROXYCHLOROQUINE SULFATE 200 MG/1
TABLET, FILM COATED ORAL
Qty: 180 TABLET | Refills: 3 | Status: SHIPPED | OUTPATIENT
Start: 2018-12-14 | End: 2019-12-18 | Stop reason: SDUPTHER

## 2018-12-14 NOTE — TELEPHONE ENCOUNTER
Estrace refill should be obtained by PCP or GYN. Is she up to date with her mammogram? Other refills requested were sent.

## 2018-12-21 ENCOUNTER — TELEPHONE (OUTPATIENT)
Dept: RHEUMATOLOGY | Facility: CLINIC | Age: 63
End: 2018-12-21

## 2018-12-21 DIAGNOSIS — M35.00 SJOGREN'S SYNDROME, WITH UNSPECIFIED ORGAN INVOLVEMENT: ICD-10-CM

## 2018-12-21 DIAGNOSIS — M32.9 SYSTEMIC LUPUS ERYTHEMATOSUS, UNSPECIFIED SLE TYPE, UNSPECIFIED ORGAN INVOLVEMENT STATUS: ICD-10-CM

## 2018-12-21 DIAGNOSIS — M79.7 FIBROMYALGIA: ICD-10-CM

## 2018-12-21 DIAGNOSIS — M47.817 LUMBAR AND SACRAL OSTEOARTHRITIS: ICD-10-CM

## 2018-12-21 DIAGNOSIS — R09.81 SINUS CONGESTION: ICD-10-CM

## 2018-12-21 RX ORDER — POTASSIUM CHLORIDE 750 MG/1
10 TABLET, EXTENDED RELEASE ORAL DAILY
Qty: 90 TABLET | Refills: 1 | Status: SHIPPED | OUTPATIENT
Start: 2018-12-21 | End: 2019-03-21

## 2018-12-21 NOTE — TELEPHONE ENCOUNTER
----- Message from Cj Ring sent at 12/20/2018  4:42 PM CST -----  Contact: Sammy  Type: Needs Medical Advice    Who Called:  Sammy with [facility]  Symptoms (please be specific):   How long has patient had these symptoms:    Pharmacy name and phone #:  [phone] [facility]  Best Call Back Number:   Additional Information: requesting approval that medication potassium cloride is prescribe by

## 2019-02-07 DIAGNOSIS — M35.00 SJOGREN'S SYNDROME, WITH UNSPECIFIED ORGAN INVOLVEMENT: ICD-10-CM

## 2019-02-07 RX ORDER — ESTRADIOL 1 MG/1
TABLET ORAL
Qty: 30 TABLET | Refills: 0 | OUTPATIENT
Start: 2019-02-07

## 2019-02-07 RX ORDER — SULFAMETHOXAZOLE AND TRIMETHOPRIM 800; 160 MG/1; MG/1
TABLET ORAL
Qty: 20 TABLET | Refills: 1 | Status: SHIPPED | OUTPATIENT
Start: 2019-02-07 | End: 2019-06-07 | Stop reason: ALTCHOICE

## 2019-02-22 DIAGNOSIS — M35.00 SJOGREN'S SYNDROME, WITH UNSPECIFIED ORGAN INVOLVEMENT: ICD-10-CM

## 2019-02-22 NOTE — TELEPHONE ENCOUNTER
----- Message from Alessandra Fan sent at 2/22/2019  4:32 PM CST -----  Contact: Patient  Type:  RX Refill Request    Who Called:  Patient  Refill or New Rx:  refill  RX Name and Strength:  estradiol (ESTRACE) 1 MG tablet  How is the patient currently taking it? (ex. 1XDay):  1x day  Is this a 30 day or 90 day RX:  90 day  Preferred Pharmacy with phone number:      [facility] - [address]  [address]  [facility] [address]  Phone: [phone] Fax: [phone]     Local or Mail Order:  mail  Ordering Provider:  Dr. Rhianna Meza Call Back Number: [phone] (home)     Additional Information:  tk

## 2019-02-25 RX ORDER — ESTRADIOL 1 MG/1
1 TABLET ORAL DAILY
Qty: 30 TABLET | Refills: 0 | Status: SHIPPED | OUTPATIENT
Start: 2019-02-25 | End: 2020-05-13

## 2019-02-25 NOTE — TELEPHONE ENCOUNTER
Contacted patient and informed medication was sent but patient will need to schedule appointment with OBGYN for future refills. Patient verbalized understanding.

## 2019-05-27 RX ORDER — MUPIROCIN 20 MG/G
OINTMENT TOPICAL
Qty: 44 G | Refills: 3 | Status: SHIPPED | OUTPATIENT
Start: 2019-05-27 | End: 2019-06-07

## 2019-06-07 ENCOUNTER — OFFICE VISIT (OUTPATIENT)
Dept: RHEUMATOLOGY | Facility: CLINIC | Age: 64
End: 2019-06-07
Payer: MEDICARE

## 2019-06-07 VITALS
SYSTOLIC BLOOD PRESSURE: 122 MMHG | DIASTOLIC BLOOD PRESSURE: 81 MMHG | WEIGHT: 222 LBS | BODY MASS INDEX: 36.99 KG/M2 | HEIGHT: 65 IN | HEART RATE: 68 BPM

## 2019-06-07 DIAGNOSIS — B37.9 CANDIDA ALBICANS INFECTION: ICD-10-CM

## 2019-06-07 DIAGNOSIS — M35.00 SJOGREN'S SYNDROME, WITH UNSPECIFIED ORGAN INVOLVEMENT: ICD-10-CM

## 2019-06-07 DIAGNOSIS — M79.7 FIBROMYALGIA: ICD-10-CM

## 2019-06-07 DIAGNOSIS — R09.81 SINUS CONGESTION: ICD-10-CM

## 2019-06-07 DIAGNOSIS — R42 DIZZINESS: Primary | ICD-10-CM

## 2019-06-07 DIAGNOSIS — R79.9 ABNORMAL FINDING OF BLOOD CHEMISTRY: ICD-10-CM

## 2019-06-07 DIAGNOSIS — M47.817 LUMBAR AND SACRAL OSTEOARTHRITIS: ICD-10-CM

## 2019-06-07 DIAGNOSIS — M32.9 SYSTEMIC LUPUS ERYTHEMATOSUS, UNSPECIFIED SLE TYPE, UNSPECIFIED ORGAN INVOLVEMENT STATUS: ICD-10-CM

## 2019-06-07 PROCEDURE — 99999 PR PBB SHADOW E&M-EST. PATIENT-LVL III: ICD-10-PCS | Mod: PBBFAC,,, | Performed by: INTERNAL MEDICINE

## 2019-06-07 PROCEDURE — 99215 PR OFFICE/OUTPT VISIT, EST, LEVL V, 40-54 MIN: ICD-10-PCS | Mod: 25,S$PBB,, | Performed by: INTERNAL MEDICINE

## 2019-06-07 PROCEDURE — 99213 OFFICE O/P EST LOW 20 MIN: CPT | Mod: PBBFAC,PO | Performed by: INTERNAL MEDICINE

## 2019-06-07 PROCEDURE — 96372 THER/PROPH/DIAG INJ SC/IM: CPT | Mod: PBBFAC,PO

## 2019-06-07 PROCEDURE — 99999 PR PBB SHADOW E&M-EST. PATIENT-LVL III: CPT | Mod: PBBFAC,,, | Performed by: INTERNAL MEDICINE

## 2019-06-07 PROCEDURE — 99215 OFFICE O/P EST HI 40 MIN: CPT | Mod: 25,S$PBB,, | Performed by: INTERNAL MEDICINE

## 2019-06-07 RX ORDER — VALSARTAN 320 MG/1
320 TABLET ORAL DAILY
COMMUNITY
Start: 2019-05-13 | End: 2021-05-25 | Stop reason: SDUPTHER

## 2019-06-07 RX ORDER — POTASSIUM CHLORIDE 750 MG/1
TABLET, EXTENDED RELEASE ORAL
COMMUNITY
End: 2019-06-07

## 2019-06-07 RX ORDER — METHYLPREDNISOLONE ACETATE 80 MG/ML
160 INJECTION, SUSPENSION INTRA-ARTICULAR; INTRALESIONAL; INTRAMUSCULAR; SOFT TISSUE
Status: COMPLETED | OUTPATIENT
Start: 2019-06-07 | End: 2019-06-07

## 2019-06-07 RX ORDER — KETOROLAC TROMETHAMINE 30 MG/ML
60 INJECTION, SOLUTION INTRAMUSCULAR; INTRAVENOUS
Qty: 2 ML | Refills: 8 | Status: SHIPPED | OUTPATIENT
Start: 2019-06-07 | End: 2019-07-19 | Stop reason: SDUPTHER

## 2019-06-07 RX ORDER — METHYLPREDNISOLONE ACETATE 80 MG/ML
160 INJECTION, SUSPENSION INTRA-ARTICULAR; INTRALESIONAL; INTRAMUSCULAR; SOFT TISSUE
Qty: 6 ML | Refills: 3 | Status: SHIPPED | OUTPATIENT
Start: 2019-06-07 | End: 2019-08-26 | Stop reason: SDUPTHER

## 2019-06-07 RX ORDER — SYRINGE REUSABLE, 3 ML
1 SYRINGE, REUSABLE MISCELLANEOUS DAILY
Qty: 100 EACH | Refills: 3 | Status: SHIPPED | OUTPATIENT
Start: 2019-06-07 | End: 2020-08-19 | Stop reason: SDUPTHER

## 2019-06-07 RX ORDER — NEEDLES, DISPOSABLE 25GX5/8"
1 NEEDLE, DISPOSABLE MISCELLANEOUS WEEKLY
Qty: 25 EACH | Refills: 3 | Status: SHIPPED | OUTPATIENT
Start: 2019-06-07 | End: 2020-08-19 | Stop reason: SDUPTHER

## 2019-06-07 RX ORDER — CYANOCOBALAMIN 1000 UG/ML
1000 INJECTION, SOLUTION INTRAMUSCULAR; SUBCUTANEOUS
Qty: 30 ML | Refills: 3 | Status: SHIPPED | OUTPATIENT
Start: 2019-06-07 | End: 2019-08-26 | Stop reason: SDUPTHER

## 2019-06-07 RX ORDER — CYANOCOBALAMIN 1000 UG/ML
1000 INJECTION, SOLUTION INTRAMUSCULAR; SUBCUTANEOUS
Status: COMPLETED | OUTPATIENT
Start: 2019-06-07 | End: 2019-06-07

## 2019-06-07 RX ORDER — KETOROLAC TROMETHAMINE 30 MG/ML
60 INJECTION, SOLUTION INTRAMUSCULAR; INTRAVENOUS
Status: COMPLETED | OUTPATIENT
Start: 2019-06-07 | End: 2019-06-07

## 2019-06-07 RX ORDER — ACETAMINOPHEN 500 MG
2 TABLET ORAL
COMMUNITY

## 2019-06-07 RX ADMIN — METHYLPREDNISOLONE ACETATE 160 MG: 80 INJECTION, SUSPENSION INTRA-ARTICULAR; INTRALESIONAL; INTRAMUSCULAR; SOFT TISSUE at 01:06

## 2019-06-07 RX ADMIN — CYANOCOBALAMIN 1000 MCG: 1000 INJECTION, SOLUTION INTRAMUSCULAR at 01:06

## 2019-06-07 RX ADMIN — KETOROLAC TROMETHAMINE 60 MG: 60 INJECTION, SOLUTION INTRAMUSCULAR at 01:06

## 2019-06-07 ASSESSMENT — ROUTINE ASSESSMENT OF PATIENT INDEX DATA (RAPID3)
TOTAL RAPID3 SCORE: 8.83
PATIENT GLOBAL ASSESSMENT SCORE: 8
PSYCHOLOGICAL DISTRESS SCORE: 2.2
MDHAQ FUNCTION SCORE: 3
PAIN SCORE: 8.5

## 2019-06-07 NOTE — PROGRESS NOTES
Administered 1 cc B12 1000mcg to right upper outer gluteal.  Pt tolerated well.No acute reaction noted to site. Pt instructed on S/S to report. Advised pt to waitn in lobby 15 minutes after receiving injection to monitor for any reactions. Pt verbalized understanding.     Lot:[id number]  Exp:Oct20  Administered 2 cc DepoMedrol 80mg/cc  to right upper outer gluteal. Pt tolerated well. No acute reaction noted to site. Pt instructed on S/S to report. Advised patient to wait in lobby 15 minutes after receiving injection to monitor for any reactions..  Pt verbalized understanding.     Lot: [id number]   Exp: 05/20  Administered 2 cc  Toradol 30mg/cc  to right upper outer gluteal. Pt tolerated well. No acute reaction noted to site. Pt instructed on S/S to report. Advised patient to wait in lobby 15 minutes after receiving injection to monitor for any reactions. Pt verbalized understanding.     Lot: -[id number]  Exp: 1May2020

## 2019-06-07 NOTE — PROGRESS NOTES
Subjective:       Patient ID: Angelica Chan is a 63 y.o. female.    Chief Complaint: Lupus (sle)    Follow up:   Lupus and sjogren's syndrome,  Doing fair she feels like she is retaining fluid and is concerned about her kidneys, Patient complains of arthralgias and myalgias for which has been present for a few years. Pain is located in multiple joints, both shoulder(s), both elbow(s), both wrist(s), both MCP(s): 1st, 2nd, 3rd, 4th and 5th, both PIP(s): 1st, 2nd, 3rd, 4th and 5th, both DIP(s): 1st and 2nd, both hip(s), both knee(s) and both MTP(s): 1st, 2nd, 3rd, 4th and 5th, is described as aching, pulsating, shooting and throbbing, and is constant, moderate .  She feel and fracture  Her patella and now its healing, dizziness    Review of Systems   Constitutional: Positive for activity change. Negative for appetite change and unexpected weight change.   HENT: Negative for dental problem, ear discharge, ear pain, facial swelling, mouth sores, nosebleeds, postnasal drip, rhinorrhea, sinus pressure, sneezing, tinnitus and voice change.    Eyes: Negative for photophobia, pain, discharge, redness and itching.   Respiratory: Negative for apnea, chest tightness, shortness of breath and wheezing.    Cardiovascular: Positive for leg swelling. Negative for palpitations.   Gastrointestinal: Negative for abdominal distention, constipation and diarrhea.   Endocrine: Negative for cold intolerance, heat intolerance, polydipsia and polyuria.   Genitourinary: Negative for decreased urine volume, difficulty urinating, flank pain, frequency, hematuria and urgency.   Musculoskeletal: Positive for back pain, joint swelling, myalgias and neck stiffness. Negative for gait problem.   Skin: Negative for pallor and wound.        Drainage of incision   Allergic/Immunologic: Negative for immunocompromised state.   Neurological: Negative for dizziness and tremors.   Hematological: Negative for adenopathy. Does not bruise/bleed easily.  "  Psychiatric/Behavioral: Negative for sleep disturbance. The patient is not nervous/anxious.          Objective:     /81 (BP Location: Left arm, Patient Position: Sitting, BP Method: Large (Automatic))   Pulse 68   Ht 5' 5" (1.651 m)   Wt 100.7 kg (222 lb)   BMI 36.94 kg/m²      Physical Exam   Vitals reviewed.  Constitutional: She is oriented to person, place, and time. No distress.   HENT:   Head: Normocephalic and atraumatic.   Mouth thrush   Eyes: EOM are normal. Pupils are equal, round, and reactive to light. Right eye exhibits no discharge. Left eye exhibits no discharge.   Neck: Neck supple. No thyromegaly present.   Cardiovascular: Normal rate, regular rhythm and normal heart sounds.  Exam reveals no gallop and no friction rub.    No murmur heard.  Pulmonary/Chest: Breath sounds normal. She has no wheezes. She has no rales. She exhibits no tenderness.   Abdominal: There is no tenderness. There is no rebound and no guarding.       Right Side Rheumatological Exam     Examination finds the elbow normal.    The patient is tender to palpation of the shoulder, wrist, knee, 1st PIP, 1st MCP, 2nd PIP, 2nd MCP, 3rd PIP, 3rd MCP, 4th PIP, 4th MCP, 5th PIP and 5th MCP    She has swelling of the 1st PIP, 1st MCP, 2nd PIP, 2nd MCP, 3rd PIP, 3rd MCP, 4th PIP, 4th MCP, 5th PIP and 5th MCP    Shoulder Exam   Tenderness Location: no tenderness    Range of Motion   Active abduction: abnormal   Adduction: abnormal  Sensation: normal    Knee Exam   Patellofemoral Crepitus: positive  Effusion: positive  Sensation: normal    Hip Exam   Tenderness Location: posterior  Sensation: normal    Elbow/Wrist Exam   Tenderness Location: no tenderness  Sensation: normal    Left Side Rheumatological Exam     The patient is tender to palpation of the shoulder, elbow, wrist, knee, 1st PIP, 1st MCP, 2nd PIP, 2nd MCP, 3rd PIP, 3rd MCP, 4th PIP, 4th MCP, 5th PIP and 5th MCP.    She has swelling of the 1st PIP, 1st MCP, 2nd PIP, 2nd " "MCP, 3rd PIP, 3rd MCP, 4th PIP, 4th MCP, 5th PIP and 5th MCP    Shoulder Exam   Tenderness Location: no tenderness    Range of Motion   Active abduction: abnormal   Sensation: normal    Knee Exam     Patellofemoral Crepitus: positive  Effusion: positive  Sensation: normal    Hip Exam   Tenderness Location: posterior  Sensation: normal    Elbow/Wrist Exam   Sensation: normal      Back/Neck Exam   General Inspection   Gait: normal         Lymphadenopathy:     She has no cervical adenopathy.   Neurological: She is alert and oriented to person, place, and time. Gait normal.   Skin: Skin is dry. No rash noted. No erythema. No pallor.     Incision and drainage   Psychiatric: Mood and affect normal.   Musculoskeletal: She exhibits tenderness and deformity. She exhibits no edema.       labs not done  Assessment:       .  Encounter Diagnoses   Name Primary?    Dizziness Yes    Systemic lupus erythematosus, unspecified SLE type, unspecified organ involvement status     Fibromyalgia     Sinus congestion     Sjogren's syndrome, with unspecified organ involvement     Lumbar and sacral osteoarthritis     Candida albicans infection     Abnormal finding of blood chemistry          Plan:     Dizziness  -     cyanocobalamin 1,000 mcg/mL injection; Inject 1 mL (1,000 mcg total) into the skin every 7 days.  Dispense: 30 mL; Refill: 3  -     methylPREDNISolone acetate (DEPO-MEDROL) 80 mg/mL injection; Inject 2 mLs (160 mg total) into the muscle every 30 days.  Dispense: 6 mL; Refill: 3  -     ketorolac (TORADOL) 60 mg/2 mL Soln; Inject 2 mLs (60 mg total) into the muscle every 6 weeks.  Dispense: 2 mL; Refill: 8  -     syringe, reusable, 3 mL SyrR; 1 Syringe by Misc.(Non-Drug; Combo Route) route once daily. 25 G needle 1 1/2  length  Dispense: 100 each; Refill: 3  -     blunt needle, disposable 18 x 1 1/2 " Ndle; 1 Syringe by Misc.(Non-Drug; Combo Route) route once a week.  Dispense: 25 each; Refill: 3  -     needle, disp, 25 " "gauge 25 gauge x 1 1/2" Ndle; 1 each by Misc.(Non-Drug; Combo Route) route once a week.  Dispense: 25 each; Refill: 3  -     MARTI Screen w/Reflex; Future; Expected date: 06/07/2019  -     Anti Sm/RNP Antibody; Future; Expected date: 06/07/2019  -     Anti-DNA antibody, double-stranded; Future; Expected date: 06/07/2019  -     Anti-Histone Antibody; Future; Expected date: 06/07/2019  -     Anti-Smith antibody; Future; Expected date: 06/07/2019  -     CBC auto differential; Future; Expected date: 06/07/2019  -     Comprehensive metabolic panel; Future; Expected date: 06/07/2019  -     C-reactive protein; Future; Expected date: 06/07/2019  -     Sedimentation rate; Future; Expected date: 06/07/2019  -     Sjogrens syndrome-A extractable nuclear antibody; Future; Expected date: 06/07/2019  -     Sjogrens syndrome-B extractable nuclear antibody; Future; Expected date: 06/07/2019  -     Urinalysis; Future; Expected date: 06/07/2019  -     TSH; Future; Expected date: 06/07/2019  -     T4, free; Future; Expected date: 06/07/2019  -     T3, free; Future; Expected date: 06/07/2019  -     Vitamin D; Future; Expected date: 06/07/2019  -     Hemoglobin A1c; Future; Expected date: 06/07/2019  -     ketorolac injection 60 mg  -     methylPREDNISolone acetate injection 160 mg  -     cyanocobalamin injection 1,000 mcg    Systemic lupus erythematosus, unspecified SLE type, unspecified organ involvement status  -     cyanocobalamin 1,000 mcg/mL injection; Inject 1 mL (1,000 mcg total) into the skin every 7 days.  Dispense: 30 mL; Refill: 3  -     methylPREDNISolone acetate (DEPO-MEDROL) 80 mg/mL injection; Inject 2 mLs (160 mg total) into the muscle every 30 days.  Dispense: 6 mL; Refill: 3  -     ketorolac (TORADOL) 60 mg/2 mL Soln; Inject 2 mLs (60 mg total) into the muscle every 6 weeks.  Dispense: 2 mL; Refill: 8  -     syringe, reusable, 3 mL SyrR; 1 Syringe by Misc.(Non-Drug; Combo Route) route once daily. 25 G needle 1 1/2  " "length  Dispense: 100 each; Refill: 3  -     blunt needle, disposable 18 x 1 1/2 " Ndle; 1 Syringe by Misc.(Non-Drug; Combo Route) route once a week.  Dispense: 25 each; Refill: 3  -     needle, disp, 25 gauge 25 gauge x 1 1/2" Ndle; 1 each by Misc.(Non-Drug; Combo Route) route once a week.  Dispense: 25 each; Refill: 3  -     MARTI Screen w/Reflex; Future; Expected date: 06/07/2019  -     Anti Sm/RNP Antibody; Future; Expected date: 06/07/2019  -     Anti-DNA antibody, double-stranded; Future; Expected date: 06/07/2019  -     Anti-Histone Antibody; Future; Expected date: 06/07/2019  -     Anti-Smith antibody; Future; Expected date: 06/07/2019  -     CBC auto differential; Future; Expected date: 06/07/2019  -     Comprehensive metabolic panel; Future; Expected date: 06/07/2019  -     C-reactive protein; Future; Expected date: 06/07/2019  -     Sedimentation rate; Future; Expected date: 06/07/2019  -     Sjogrens syndrome-A extractable nuclear antibody; Future; Expected date: 06/07/2019  -     Sjogrens syndrome-B extractable nuclear antibody; Future; Expected date: 06/07/2019  -     Urinalysis; Future; Expected date: 06/07/2019  -     TSH; Future; Expected date: 06/07/2019  -     T4, free; Future; Expected date: 06/07/2019  -     T3, free; Future; Expected date: 06/07/2019  -     Vitamin D; Future; Expected date: 06/07/2019  -     Hemoglobin A1c; Future; Expected date: 06/07/2019  -     ketorolac injection 60 mg  -     methylPREDNISolone acetate injection 160 mg  -     cyanocobalamin injection 1,000 mcg    Fibromyalgia  -     cyanocobalamin 1,000 mcg/mL injection; Inject 1 mL (1,000 mcg total) into the skin every 7 days.  Dispense: 30 mL; Refill: 3  -     methylPREDNISolone acetate (DEPO-MEDROL) 80 mg/mL injection; Inject 2 mLs (160 mg total) into the muscle every 30 days.  Dispense: 6 mL; Refill: 3  -     ketorolac (TORADOL) 60 mg/2 mL Soln; Inject 2 mLs (60 mg total) into the muscle every 6 weeks.  Dispense: 2 mL; " "Refill: 8  -     syringe, reusable, 3 mL SyrR; 1 Syringe by Misc.(Non-Drug; Combo Route) route once daily. 25 G needle 1 1/2  length  Dispense: 100 each; Refill: 3  -     blunt needle, disposable 18 x 1 1/2 " Ndle; 1 Syringe by Misc.(Non-Drug; Combo Route) route once a week.  Dispense: 25 each; Refill: 3  -     needle, disp, 25 gauge 25 gauge x 1 1/2" Ndle; 1 each by Misc.(Non-Drug; Combo Route) route once a week.  Dispense: 25 each; Refill: 3  -     MARTI Screen w/Reflex; Future; Expected date: 06/07/2019  -     Anti Sm/RNP Antibody; Future; Expected date: 06/07/2019  -     Anti-DNA antibody, double-stranded; Future; Expected date: 06/07/2019  -     Anti-Histone Antibody; Future; Expected date: 06/07/2019  -     Anti-Smith antibody; Future; Expected date: 06/07/2019  -     CBC auto differential; Future; Expected date: 06/07/2019  -     Comprehensive metabolic panel; Future; Expected date: 06/07/2019  -     C-reactive protein; Future; Expected date: 06/07/2019  -     Sedimentation rate; Future; Expected date: 06/07/2019  -     Sjogrens syndrome-A extractable nuclear antibody; Future; Expected date: 06/07/2019  -     Sjogrens syndrome-B extractable nuclear antibody; Future; Expected date: 06/07/2019  -     Urinalysis; Future; Expected date: 06/07/2019  -     TSH; Future; Expected date: 06/07/2019  -     T4, free; Future; Expected date: 06/07/2019  -     T3, free; Future; Expected date: 06/07/2019  -     Vitamin D; Future; Expected date: 06/07/2019  -     Hemoglobin A1c; Future; Expected date: 06/07/2019  -     ketorolac injection 60 mg  -     methylPREDNISolone acetate injection 160 mg  -     cyanocobalamin injection 1,000 mcg    Sinus congestion  -     cyanocobalamin 1,000 mcg/mL injection; Inject 1 mL (1,000 mcg total) into the skin every 7 days.  Dispense: 30 mL; Refill: 3  -     methylPREDNISolone acetate (DEPO-MEDROL) 80 mg/mL injection; Inject 2 mLs (160 mg total) into the muscle every 30 days.  Dispense: 6 mL; " "Refill: 3  -     ketorolac (TORADOL) 60 mg/2 mL Soln; Inject 2 mLs (60 mg total) into the muscle every 6 weeks.  Dispense: 2 mL; Refill: 8  -     syringe, reusable, 3 mL SyrR; 1 Syringe by Misc.(Non-Drug; Combo Route) route once daily. 25 G needle 1 1/2  length  Dispense: 100 each; Refill: 3  -     blunt needle, disposable 18 x 1 1/2 " Ndle; 1 Syringe by Misc.(Non-Drug; Combo Route) route once a week.  Dispense: 25 each; Refill: 3  -     needle, disp, 25 gauge 25 gauge x 1 1/2" Ndle; 1 each by Misc.(Non-Drug; Combo Route) route once a week.  Dispense: 25 each; Refill: 3  -     MARTI Screen w/Reflex; Future; Expected date: 06/07/2019  -     Anti Sm/RNP Antibody; Future; Expected date: 06/07/2019  -     Anti-DNA antibody, double-stranded; Future; Expected date: 06/07/2019  -     Anti-Histone Antibody; Future; Expected date: 06/07/2019  -     Anti-Smith antibody; Future; Expected date: 06/07/2019  -     CBC auto differential; Future; Expected date: 06/07/2019  -     Comprehensive metabolic panel; Future; Expected date: 06/07/2019  -     C-reactive protein; Future; Expected date: 06/07/2019  -     Sedimentation rate; Future; Expected date: 06/07/2019  -     Sjogrens syndrome-A extractable nuclear antibody; Future; Expected date: 06/07/2019  -     Sjogrens syndrome-B extractable nuclear antibody; Future; Expected date: 06/07/2019  -     Urinalysis; Future; Expected date: 06/07/2019  -     TSH; Future; Expected date: 06/07/2019  -     T4, free; Future; Expected date: 06/07/2019  -     T3, free; Future; Expected date: 06/07/2019  -     Vitamin D; Future; Expected date: 06/07/2019  -     Hemoglobin A1c; Future; Expected date: 06/07/2019  -     ketorolac injection 60 mg  -     methylPREDNISolone acetate injection 160 mg  -     cyanocobalamin injection 1,000 mcg    Sjogren's syndrome, with unspecified organ involvement  -     cyanocobalamin 1,000 mcg/mL injection; Inject 1 mL (1,000 mcg total) into the skin every 7 days.  " "Dispense: 30 mL; Refill: 3  -     methylPREDNISolone acetate (DEPO-MEDROL) 80 mg/mL injection; Inject 2 mLs (160 mg total) into the muscle every 30 days.  Dispense: 6 mL; Refill: 3  -     ketorolac (TORADOL) 60 mg/2 mL Soln; Inject 2 mLs (60 mg total) into the muscle every 6 weeks.  Dispense: 2 mL; Refill: 8  -     syringe, reusable, 3 mL SyrR; 1 Syringe by Misc.(Non-Drug; Combo Route) route once daily. 25 G needle 1 1/2  length  Dispense: 100 each; Refill: 3  -     blunt needle, disposable 18 x 1 1/2 " Ndle; 1 Syringe by Misc.(Non-Drug; Combo Route) route once a week.  Dispense: 25 each; Refill: 3  -     needle, disp, 25 gauge 25 gauge x 1 1/2" Ndle; 1 each by Misc.(Non-Drug; Combo Route) route once a week.  Dispense: 25 each; Refill: 3  -     MARTI Screen w/Reflex; Future; Expected date: 06/07/2019  -     Anti Sm/RNP Antibody; Future; Expected date: 06/07/2019  -     Anti-DNA antibody, double-stranded; Future; Expected date: 06/07/2019  -     Anti-Histone Antibody; Future; Expected date: 06/07/2019  -     Anti-Smith antibody; Future; Expected date: 06/07/2019  -     CBC auto differential; Future; Expected date: 06/07/2019  -     Comprehensive metabolic panel; Future; Expected date: 06/07/2019  -     C-reactive protein; Future; Expected date: 06/07/2019  -     Sedimentation rate; Future; Expected date: 06/07/2019  -     Sjogrens syndrome-A extractable nuclear antibody; Future; Expected date: 06/07/2019  -     Sjogrens syndrome-B extractable nuclear antibody; Future; Expected date: 06/07/2019  -     Urinalysis; Future; Expected date: 06/07/2019  -     TSH; Future; Expected date: 06/07/2019  -     T4, free; Future; Expected date: 06/07/2019  -     T3, free; Future; Expected date: 06/07/2019  -     Vitamin D; Future; Expected date: 06/07/2019  -     Hemoglobin A1c; Future; Expected date: 06/07/2019  -     ketorolac injection 60 mg  -     methylPREDNISolone acetate injection 160 mg  -     cyanocobalamin injection 1,000 " "mcg    Lumbar and sacral osteoarthritis  -     cyanocobalamin 1,000 mcg/mL injection; Inject 1 mL (1,000 mcg total) into the skin every 7 days.  Dispense: 30 mL; Refill: 3  -     methylPREDNISolone acetate (DEPO-MEDROL) 80 mg/mL injection; Inject 2 mLs (160 mg total) into the muscle every 30 days.  Dispense: 6 mL; Refill: 3  -     ketorolac (TORADOL) 60 mg/2 mL Soln; Inject 2 mLs (60 mg total) into the muscle every 6 weeks.  Dispense: 2 mL; Refill: 8  -     syringe, reusable, 3 mL SyrR; 1 Syringe by Misc.(Non-Drug; Combo Route) route once daily. 25 G needle 1 1/2  length  Dispense: 100 each; Refill: 3  -     blunt needle, disposable 18 x 1 1/2 " Ndle; 1 Syringe by Misc.(Non-Drug; Combo Route) route once a week.  Dispense: 25 each; Refill: 3  -     needle, disp, 25 gauge 25 gauge x 1 1/2" Ndle; 1 each by Misc.(Non-Drug; Combo Route) route once a week.  Dispense: 25 each; Refill: 3  -     MARTI Screen w/Reflex; Future; Expected date: 06/07/2019  -     Anti Sm/RNP Antibody; Future; Expected date: 06/07/2019  -     Anti-DNA antibody, double-stranded; Future; Expected date: 06/07/2019  -     Anti-Histone Antibody; Future; Expected date: 06/07/2019  -     Anti-Smith antibody; Future; Expected date: 06/07/2019  -     CBC auto differential; Future; Expected date: 06/07/2019  -     Comprehensive metabolic panel; Future; Expected date: 06/07/2019  -     C-reactive protein; Future; Expected date: 06/07/2019  -     Sedimentation rate; Future; Expected date: 06/07/2019  -     Sjogrens syndrome-A extractable nuclear antibody; Future; Expected date: 06/07/2019  -     Sjogrens syndrome-B extractable nuclear antibody; Future; Expected date: 06/07/2019  -     Urinalysis; Future; Expected date: 06/07/2019  -     TSH; Future; Expected date: 06/07/2019  -     T4, free; Future; Expected date: 06/07/2019  -     T3, free; Future; Expected date: 06/07/2019  -     Vitamin D; Future; Expected date: 06/07/2019  -     Hemoglobin A1c; Future; " "Expected date: 06/07/2019  -     ketorolac injection 60 mg  -     methylPREDNISolone acetate injection 160 mg  -     cyanocobalamin injection 1,000 mcg    Candida albicans infection  -     cyanocobalamin 1,000 mcg/mL injection; Inject 1 mL (1,000 mcg total) into the skin every 7 days.  Dispense: 30 mL; Refill: 3  -     methylPREDNISolone acetate (DEPO-MEDROL) 80 mg/mL injection; Inject 2 mLs (160 mg total) into the muscle every 30 days.  Dispense: 6 mL; Refill: 3  -     ketorolac (TORADOL) 60 mg/2 mL Soln; Inject 2 mLs (60 mg total) into the muscle every 6 weeks.  Dispense: 2 mL; Refill: 8  -     syringe, reusable, 3 mL SyrR; 1 Syringe by Misc.(Non-Drug; Combo Route) route once daily. 25 G needle 1 1/2  length  Dispense: 100 each; Refill: 3  -     blunt needle, disposable 18 x 1 1/2 " Ndle; 1 Syringe by Misc.(Non-Drug; Combo Route) route once a week.  Dispense: 25 each; Refill: 3  -     needle, disp, 25 gauge 25 gauge x 1 1/2" Ndle; 1 each by Misc.(Non-Drug; Combo Route) route once a week.  Dispense: 25 each; Refill: 3  -     MARTI Screen w/Reflex; Future; Expected date: 06/07/2019  -     Anti Sm/RNP Antibody; Future; Expected date: 06/07/2019  -     Anti-DNA antibody, double-stranded; Future; Expected date: 06/07/2019  -     Anti-Histone Antibody; Future; Expected date: 06/07/2019  -     Anti-Smith antibody; Future; Expected date: 06/07/2019  -     CBC auto differential; Future; Expected date: 06/07/2019  -     Comprehensive metabolic panel; Future; Expected date: 06/07/2019  -     C-reactive protein; Future; Expected date: 06/07/2019  -     Sedimentation rate; Future; Expected date: 06/07/2019  -     Sjogrens syndrome-A extractable nuclear antibody; Future; Expected date: 06/07/2019  -     Sjogrens syndrome-B extractable nuclear antibody; Future; Expected date: 06/07/2019  -     Urinalysis; Future; Expected date: 06/07/2019  -     TSH; Future; Expected date: 06/07/2019  -     T4, free; Future; Expected date: " 06/07/2019  -     T3, free; Future; Expected date: 06/07/2019  -     Vitamin D; Future; Expected date: 06/07/2019  -     Hemoglobin A1c; Future; Expected date: 06/07/2019  -     ketorolac injection 60 mg  -     methylPREDNISolone acetate injection 160 mg  -     cyanocobalamin injection 1,000 mcg    Abnormal finding of blood chemistry   -     Hemoglobin A1c; Future; Expected date: 06/07/2019  -     ketorolac injection 60 mg  -     methylPREDNISolone acetate injection 160 mg  -     cyanocobalamin injection 1,000 mcg      We will check  Labs and  Continue treatment, filled out disability

## 2019-06-10 ENCOUNTER — DOCUMENTATION ONLY (OUTPATIENT)
Dept: RHEUMATOLOGY | Facility: CLINIC | Age: 64
End: 2019-06-10

## 2019-06-13 ENCOUNTER — TELEPHONE (OUTPATIENT)
Dept: RHEUMATOLOGY | Facility: CLINIC | Age: 64
End: 2019-06-13

## 2019-06-13 ENCOUNTER — DOCUMENTATION ONLY (OUTPATIENT)
Dept: RHEUMATOLOGY | Facility: CLINIC | Age: 64
End: 2019-06-13

## 2019-06-13 NOTE — TELEPHONE ENCOUNTER
Xrays.  received. Please review and advise.   Pt notified that results received and placed on provider desk to review.

## 2019-06-13 NOTE — PROGRESS NOTES
Received DNA double stranded results from [facility] Dr Moctezuma to review

## 2019-06-13 NOTE — TELEPHONE ENCOUNTER
----- Message from Cj Ring sent at 6/13/2019  1:25 PM CDT -----  Contact: patient  Type: Needs Medical Advice    Who Called:  patient  Symptoms (please be specific):    How long has patient had these symptoms:    Pharmacy name and phone #:    Best Call Back Number: [phone]  Additional Information: requesting a call back regarding test results

## 2019-06-14 ENCOUNTER — DOCUMENTATION ONLY (OUTPATIENT)
Dept: RHEUMATOLOGY | Facility: CLINIC | Age: 64
End: 2019-06-14

## 2019-06-14 NOTE — PROGRESS NOTES
Dr Mocetzuma has reviewed DNA double stranded lab results no change in treatment at this time. Results sent to scan

## 2019-06-17 RX ORDER — SULFAMETHOXAZOLE AND TRIMETHOPRIM 800; 160 MG/1; MG/1
TABLET ORAL
Qty: 40 TABLET | Refills: 0 | Status: SHIPPED | OUTPATIENT
Start: 2019-06-17 | End: 2019-07-15 | Stop reason: SDUPTHER

## 2019-06-18 NOTE — TELEPHONE ENCOUNTER
Returned patient call regarding lab result. Informed only one test result was received. Patient will call hospital for lab results to be sent to office

## 2019-06-18 NOTE — TELEPHONE ENCOUNTER
----- Message from RT Jennifer sent at 6/18/2019  9:37 AM CDT -----  Contact: [phone], requesting lab test results, thanks.

## 2019-06-19 ENCOUNTER — DOCUMENTATION ONLY (OUTPATIENT)
Dept: RHEUMATOLOGY | Facility: CLINIC | Age: 64
End: 2019-06-19

## 2019-06-20 ENCOUNTER — TELEPHONE (OUTPATIENT)
Dept: RHEUMATOLOGY | Facility: CLINIC | Age: 64
End: 2019-06-20

## 2019-06-20 NOTE — TELEPHONE ENCOUNTER
Contacted patient and informed Dr Moctezuma has reviewed recent lab results. Nurse informed it show resolving UTI. Informed bactrim DS was sent to mail order pharmacy. Patient will contact pharmacy to have script delivered and begin taking it. Nurse advised to hydrate well while taking this medication. Patient verbalized understanding.

## 2019-07-15 DIAGNOSIS — M35.00 SJOGREN'S SYNDROME, WITH UNSPECIFIED ORGAN INVOLVEMENT: ICD-10-CM

## 2019-07-16 RX ORDER — SULFAMETHOXAZOLE AND TRIMETHOPRIM 800; 160 MG/1; MG/1
TABLET ORAL
Qty: 40 TABLET | Refills: 0 | Status: SHIPPED | OUTPATIENT
Start: 2019-07-16 | End: 2019-12-03 | Stop reason: SDUPTHER

## 2019-07-16 RX ORDER — FLUCONAZOLE 150 MG/1
TABLET ORAL
Qty: 5 TABLET | Refills: 2 | Status: SHIPPED | OUTPATIENT
Start: 2019-07-16 | End: 2020-04-29

## 2019-07-19 DIAGNOSIS — M35.00 SJOGREN'S SYNDROME, WITH UNSPECIFIED ORGAN INVOLVEMENT: ICD-10-CM

## 2019-07-19 DIAGNOSIS — R09.81 SINUS CONGESTION: ICD-10-CM

## 2019-07-19 DIAGNOSIS — M32.9 SYSTEMIC LUPUS ERYTHEMATOSUS, UNSPECIFIED SLE TYPE, UNSPECIFIED ORGAN INVOLVEMENT STATUS: ICD-10-CM

## 2019-07-19 DIAGNOSIS — B37.9 CANDIDA ALBICANS INFECTION: ICD-10-CM

## 2019-07-19 DIAGNOSIS — R42 DIZZINESS: ICD-10-CM

## 2019-07-19 DIAGNOSIS — M47.817 LUMBAR AND SACRAL OSTEOARTHRITIS: ICD-10-CM

## 2019-07-19 DIAGNOSIS — M79.7 FIBROMYALGIA: ICD-10-CM

## 2019-07-19 RX ORDER — KETOROLAC TROMETHAMINE 30 MG/ML
INJECTION, SOLUTION INTRAMUSCULAR; INTRAVENOUS
Qty: 2 ML | Refills: 3 | Status: SHIPPED | OUTPATIENT
Start: 2019-07-19 | End: 2019-08-26 | Stop reason: SDUPTHER

## 2019-08-26 DIAGNOSIS — M35.00 SJOGREN'S SYNDROME, WITH UNSPECIFIED ORGAN INVOLVEMENT: ICD-10-CM

## 2019-08-26 DIAGNOSIS — B37.9 CANDIDA ALBICANS INFECTION: ICD-10-CM

## 2019-08-26 DIAGNOSIS — R42 DIZZINESS: ICD-10-CM

## 2019-08-26 DIAGNOSIS — M32.9 SYSTEMIC LUPUS ERYTHEMATOSUS, UNSPECIFIED SLE TYPE, UNSPECIFIED ORGAN INVOLVEMENT STATUS: ICD-10-CM

## 2019-08-26 DIAGNOSIS — M79.7 FIBROMYALGIA: ICD-10-CM

## 2019-08-26 DIAGNOSIS — R09.81 SINUS CONGESTION: ICD-10-CM

## 2019-08-26 DIAGNOSIS — M47.817 LUMBAR AND SACRAL OSTEOARTHRITIS: ICD-10-CM

## 2019-08-26 NOTE — TELEPHONE ENCOUNTER
Returned patient call regard refill on injections. Nurse asked patient which injections need to be refilled. Patient stated B 12, Toradol and steroid injections to be sent to [facility].

## 2019-08-27 NOTE — TELEPHONE ENCOUNTER
----- Message from Apolonia Jean Baptiste sent at 8/27/2019  9:21 AM CDT -----  Contact: Angelica  Type:  RX Refill Request    Who Called:  patient  Refill or New Rx:  refill  RX Name and Strength:  methylPREDNISolone acetate (DEPO-MEDROL) 80 mg/mL injection  How is the patient currently taking it? (ex. 1XDay):  Inject 2 mLs (160 mg total) into the muscle every 30 days. - Intramuscular  Is this a 30 day or 90 day RX:  90 day  Preferred Pharmacy with phone number:    [facility] – [place], [place] - [address]  [address] [facility] [place] [address]  Phone: [phone] Fax: [phone]    Local or Mail Order:  local  Ordering Provider:  Rhianna Meza Call Back Number:  [phone] (home)   Additional Information:  She is at the pharmacy waiting--Please advise--thank you

## 2019-08-30 RX ORDER — METHYLPREDNISOLONE ACETATE 80 MG/ML
160 INJECTION, SUSPENSION INTRA-ARTICULAR; INTRALESIONAL; INTRAMUSCULAR; SOFT TISSUE
Qty: 6 ML | Refills: 3 | Status: SHIPPED | OUTPATIENT
Start: 2019-08-30 | End: 2019-12-18 | Stop reason: SDUPTHER

## 2019-08-30 RX ORDER — KETOROLAC TROMETHAMINE 30 MG/ML
INJECTION, SOLUTION INTRAMUSCULAR; INTRAVENOUS
Qty: 2 ML | Refills: 3 | Status: SHIPPED | OUTPATIENT
Start: 2019-08-30 | End: 2019-12-18 | Stop reason: SDUPTHER

## 2019-08-30 RX ORDER — CYANOCOBALAMIN 1000 UG/ML
1000 INJECTION, SOLUTION INTRAMUSCULAR; SUBCUTANEOUS
Qty: 30 ML | Refills: 3 | Status: SHIPPED | OUTPATIENT
Start: 2019-08-30 | End: 2019-12-18 | Stop reason: SDUPTHER

## 2019-09-17 ENCOUNTER — OFFICE (OUTPATIENT)
Dept: URBAN - METROPOLITAN AREA CLINIC 33 | Facility: CLINIC | Age: 64
End: 2019-09-17
Payer: COMMERCIAL

## 2019-09-17 ENCOUNTER — OFFICE (OUTPATIENT)
Dept: URBAN - METROPOLITAN AREA CLINIC 33 | Facility: CLINIC | Age: 64
End: 2019-09-17

## 2019-09-17 VITALS
HEART RATE: 78 BPM | HEIGHT: 65 IN | SYSTOLIC BLOOD PRESSURE: 118 MMHG | WEIGHT: 221 LBS | TEMPERATURE: 97.4 F | DIASTOLIC BLOOD PRESSURE: 72 MMHG

## 2019-09-17 DIAGNOSIS — Z86.010 PERSONAL HISTORY OF COLONIC POLYPS: ICD-10-CM

## 2019-09-17 DIAGNOSIS — K21.9 GASTRO-ESOPHAGEAL REFLUX DISEASE WITHOUT ESOPHAGITIS: ICD-10-CM

## 2019-09-17 DIAGNOSIS — R49.9 UNSPECIFIED VOICE AND RESONANCE DISORDER: ICD-10-CM

## 2019-09-17 DIAGNOSIS — R11.0 NAUSEA: ICD-10-CM

## 2019-09-17 PROCEDURE — 00031: CPT

## 2019-09-17 PROCEDURE — 99214 OFFICE O/P EST MOD 30 MIN: CPT

## 2019-09-17 NOTE — SERVICEHPINOTES
Ms. Selby is a 63 yr old female here to discuss a colonoscopy. Her last colonoscopy was in June of 2016 and two polyps were removed one was a 12 mm adenoma given a 3 yr recall. She denies chest pain, SOB, constipation, diarrhea, abdominal pain, rectal bleeding, and weight loss. In December, she developed a cough that wouldn't stop as well as wheezing. She took abx, inhalers, etc. She has been dx with dysfunction of the voice box (possible). She recently saw pulmonologist and may have asthma. She takes Symbicort daily and Albuterol prn. She takes daily Pepcid and Nexium but still has frequent nausea doesn't vomit. Nausea has been a newer symptom, has began since the last EGD. No triggers to her nausea and nothing improves it. Nausea didn't improve when Pepcid was added to her regimen. No early satiety or anorexia. No nocturnal symptoms. She has a hx of dysphagia which occurs very infrequently and occurs with rice. She has chronic epigastric pain. Takes Celebrex BID but no additional NSAID use. No known heart issues.

## 2019-09-25 ENCOUNTER — OFFICE (OUTPATIENT)
Dept: URBAN - METROPOLITAN AREA CLINIC 32 | Facility: CLINIC | Age: 64
End: 2019-09-25
Payer: COMMERCIAL

## 2019-09-25 ENCOUNTER — OFFICE (OUTPATIENT)
Dept: URBAN - METROPOLITAN AREA PATHOLOGY 17 | Facility: PATHOLOGY | Age: 64
End: 2019-09-25
Payer: COMMERCIAL

## 2019-09-25 VITALS
TEMPERATURE: 98.1 F | DIASTOLIC BLOOD PRESSURE: 75 MMHG | OXYGEN SATURATION: 98 % | OXYGEN SATURATION: 96 % | HEART RATE: 64 BPM | WEIGHT: 221 LBS | SYSTOLIC BLOOD PRESSURE: 155 MMHG | HEART RATE: 66 BPM | DIASTOLIC BLOOD PRESSURE: 79 MMHG | SYSTOLIC BLOOD PRESSURE: 148 MMHG | DIASTOLIC BLOOD PRESSURE: 74 MMHG | HEART RATE: 70 BPM | DIASTOLIC BLOOD PRESSURE: 100 MMHG | OXYGEN SATURATION: 94 % | RESPIRATION RATE: 16 BRPM | HEART RATE: 68 BPM | RESPIRATION RATE: 21 BRPM | DIASTOLIC BLOOD PRESSURE: 80 MMHG | RESPIRATION RATE: 19 BRPM | HEART RATE: 67 BPM | TEMPERATURE: 97.5 F | SYSTOLIC BLOOD PRESSURE: 151 MMHG | OXYGEN SATURATION: 93 % | HEIGHT: 65 IN | SYSTOLIC BLOOD PRESSURE: 139 MMHG | RESPIRATION RATE: 18 BRPM | HEART RATE: 103 BPM | SYSTOLIC BLOOD PRESSURE: 142 MMHG | RESPIRATION RATE: 24 BRPM | SYSTOLIC BLOOD PRESSURE: 140 MMHG | DIASTOLIC BLOOD PRESSURE: 68 MMHG | HEART RATE: 72 BPM | OXYGEN SATURATION: 95 % | SYSTOLIC BLOOD PRESSURE: 146 MMHG | DIASTOLIC BLOOD PRESSURE: 81 MMHG | DIASTOLIC BLOOD PRESSURE: 77 MMHG | SYSTOLIC BLOOD PRESSURE: 131 MMHG | SYSTOLIC BLOOD PRESSURE: 128 MMHG

## 2019-09-25 DIAGNOSIS — K29.60 OTHER GASTRITIS WITHOUT BLEEDING: ICD-10-CM

## 2019-09-25 DIAGNOSIS — R11.0 NAUSEA: ICD-10-CM

## 2019-09-25 DIAGNOSIS — Z86.010 PERSONAL HISTORY OF COLONIC POLYPS: ICD-10-CM

## 2019-09-25 DIAGNOSIS — K21.9 GASTRO-ESOPHAGEAL REFLUX DISEASE WITHOUT ESOPHAGITIS: ICD-10-CM

## 2019-09-25 PROBLEM — K31.89 OTHER DISEASES OF STOMACH AND DUODENUM: Status: ACTIVE | Noted: 2019-09-25

## 2019-09-25 PROCEDURE — 88305 TISSUE EXAM BY PATHOLOGIST: CPT

## 2019-09-25 PROCEDURE — 45378 DIAGNOSTIC COLONOSCOPY: CPT

## 2019-09-25 PROCEDURE — 88312 SPECIAL STAINS GROUP 1: CPT

## 2019-09-25 PROCEDURE — 88313 SPECIAL STAINS GROUP 2: CPT

## 2019-09-25 PROCEDURE — 43239 EGD BIOPSY SINGLE/MULTIPLE: CPT

## 2019-09-25 PROCEDURE — 88342 IMHCHEM/IMCYTCHM 1ST ANTB: CPT

## 2019-09-25 NOTE — SERVICEHPINOTES
Pt presents for EGD due to GERD and nausea, and colonoscopy due to prior advanced adenoma (no family history of colon cancer).

## 2019-10-29 ENCOUNTER — OFFICE (OUTPATIENT)
Dept: URBAN - METROPOLITAN AREA CLINIC 33 | Facility: CLINIC | Age: 64
End: 2019-10-29

## 2019-10-29 VITALS
TEMPERATURE: 97.9 F | SYSTOLIC BLOOD PRESSURE: 128 MMHG | HEIGHT: 65 IN | DIASTOLIC BLOOD PRESSURE: 77 MMHG | WEIGHT: 223 LBS | HEART RATE: 80 BPM

## 2019-10-29 DIAGNOSIS — K22.70 BARRETT'S ESOPHAGUS WITHOUT DYSPLASIA: ICD-10-CM

## 2019-10-29 DIAGNOSIS — K21.9 GASTRO-ESOPHAGEAL REFLUX DISEASE WITHOUT ESOPHAGITIS: ICD-10-CM

## 2019-10-29 DIAGNOSIS — K20.8 OTHER ESOPHAGITIS: ICD-10-CM

## 2019-10-29 DIAGNOSIS — R10.13 EPIGASTRIC PAIN: ICD-10-CM

## 2019-10-29 DIAGNOSIS — R11.0 NAUSEA: ICD-10-CM

## 2019-10-29 PROCEDURE — 99213 OFFICE O/P EST LOW 20 MIN: CPT

## 2019-10-29 NOTE — SERVICEHPINOTES
Ms. Nelson is here for f/u regarding recent EGD obtained due to chronic GERD and nausea. A colonoscopy was also obtained for hx of adenomatous polyps. The colonoscopy was unremarkable, the EGD showed grade A esophagitis, Gilbert's esophagus, and mild gastritis. Dr. Lan switched her PPI from Nexium to Pantoprazole. She has tried Nexium and Pepcid BID which didn't help. Now she is on Pantoprazole once a day which isn't helping. She also notes a very "nervous" stomach which she wonders if this is a contributing factors. She has chronic pain in the epigastrium since the age of 18. Stress and spicy foods can exacerbate symptoms. Drinking soda seems to "settle" things down. She feels a globus sensation. She has throat burning which she believes is attributed to taking her inhalers. She has burning in her chest. No vomiting and she is constantly nausea feels better w/PO intake.

## 2019-12-06 RX ORDER — SULFAMETHOXAZOLE AND TRIMETHOPRIM 800; 160 MG/1; MG/1
TABLET ORAL
Qty: 40 TABLET | Refills: 0 | Status: SHIPPED | OUTPATIENT
Start: 2019-12-06 | End: 2020-04-29

## 2019-12-06 RX ORDER — POTASSIUM CHLORIDE 750 MG/1
TABLET, EXTENDED RELEASE ORAL
Qty: 90 TABLET | Refills: 3 | Status: SHIPPED | OUTPATIENT
Start: 2019-12-06 | End: 2020-09-29

## 2019-12-10 ENCOUNTER — OFFICE (OUTPATIENT)
Dept: URBAN - METROPOLITAN AREA CLINIC 33 | Facility: CLINIC | Age: 64
End: 2019-12-10

## 2019-12-10 VITALS
HEART RATE: 67 BPM | WEIGHT: 222 LBS | HEIGHT: 65 IN | TEMPERATURE: 97.3 F | DIASTOLIC BLOOD PRESSURE: 91 MMHG | SYSTOLIC BLOOD PRESSURE: 124 MMHG

## 2019-12-10 DIAGNOSIS — K22.70 BARRETT'S ESOPHAGUS WITHOUT DYSPLASIA: ICD-10-CM

## 2019-12-10 DIAGNOSIS — K21.9 GASTRO-ESOPHAGEAL REFLUX DISEASE WITHOUT ESOPHAGITIS: ICD-10-CM

## 2019-12-10 DIAGNOSIS — R10.9 UNSPECIFIED ABDOMINAL PAIN: ICD-10-CM

## 2019-12-10 PROCEDURE — 99214 OFFICE O/P EST MOD 30 MIN: CPT

## 2019-12-10 NOTE — SERVICEHPINOTES
Ms. Nelson is a 64 yr old female here for f/u regarding GERD and nausea as well as a colonoscopy for a hx of tubular adenomas.   The colonoscopy was unremarkable, the EGD showed grade A esophagitis, Gilbert's esophagus, and mild gastritis. Dr. Lan switched her PPI from Nexium to Pantoprazole. She has tried Nexium and Pepcid BID which didn't help. She was taking Pantoprazole once a day which didn't help. She also notes a very "nervous" stomach which she wonders if this is a contributing factors. She has chronic pain in the epigastrium since the age of 18. Stress and spicy foods can exacerbate symptoms. Drinking soda seems to "settle" things down. She feels a globus sensation. She has throat burning which she believes is attributed to taking her inhalers. She has burning in her chest. No vomiting and she is constantly nausea feels better w/PO intake. At the last office visit, we switched her PPI to Dexilant, reviewed GERD triggering foods. She felt like the Dexilant was better than the Nexium. She still gets some stomach burning on the Dexilant. She takes Amitriptyline 50 mg per day as well as Lamictal and Duloxetine.

## 2019-12-18 ENCOUNTER — OFFICE VISIT (OUTPATIENT)
Dept: RHEUMATOLOGY | Facility: CLINIC | Age: 64
End: 2019-12-18
Payer: MEDICARE

## 2019-12-18 VITALS
WEIGHT: 222 LBS | BODY MASS INDEX: 36.94 KG/M2 | SYSTOLIC BLOOD PRESSURE: 151 MMHG | RESPIRATION RATE: 12 BRPM | DIASTOLIC BLOOD PRESSURE: 87 MMHG | HEART RATE: 71 BPM

## 2019-12-18 DIAGNOSIS — M47.817 LUMBAR AND SACRAL OSTEOARTHRITIS: ICD-10-CM

## 2019-12-18 DIAGNOSIS — B37.9 CANDIDA ALBICANS INFECTION: ICD-10-CM

## 2019-12-18 DIAGNOSIS — M79.7 FIBROMYALGIA: ICD-10-CM

## 2019-12-18 DIAGNOSIS — M32.9 SYSTEMIC LUPUS ERYTHEMATOSUS, UNSPECIFIED SLE TYPE, UNSPECIFIED ORGAN INVOLVEMENT STATUS: Primary | ICD-10-CM

## 2019-12-18 DIAGNOSIS — M35.1 MIXED CONNECTIVE TISSUE DISEASE: ICD-10-CM

## 2019-12-18 DIAGNOSIS — R42 DIZZINESS: ICD-10-CM

## 2019-12-18 DIAGNOSIS — F32.A DEPRESSION, UNSPECIFIED DEPRESSION TYPE: ICD-10-CM

## 2019-12-18 DIAGNOSIS — R09.81 SINUS CONGESTION: ICD-10-CM

## 2019-12-18 DIAGNOSIS — M35.00 SJOGREN'S SYNDROME, WITH UNSPECIFIED ORGAN INVOLVEMENT: ICD-10-CM

## 2019-12-18 PROCEDURE — 99999 PR PBB SHADOW E&M-EST. PATIENT-LVL IV: ICD-10-PCS | Mod: PBBFAC,,, | Performed by: INTERNAL MEDICINE

## 2019-12-18 PROCEDURE — 99214 PR OFFICE/OUTPT VISIT, EST, LEVL IV, 30-39 MIN: ICD-10-PCS | Mod: S$PBB,,, | Performed by: INTERNAL MEDICINE

## 2019-12-18 PROCEDURE — 99214 OFFICE O/P EST MOD 30 MIN: CPT | Mod: PBBFAC,PO | Performed by: INTERNAL MEDICINE

## 2019-12-18 PROCEDURE — 99999 PR PBB SHADOW E&M-EST. PATIENT-LVL IV: CPT | Mod: PBBFAC,,, | Performed by: INTERNAL MEDICINE

## 2019-12-18 PROCEDURE — 99214 OFFICE O/P EST MOD 30 MIN: CPT | Mod: S$PBB,,, | Performed by: INTERNAL MEDICINE

## 2019-12-18 RX ORDER — HYDROXYCHLOROQUINE SULFATE 200 MG/1
200 TABLET, FILM COATED ORAL 2 TIMES DAILY
Qty: 180 TABLET | Refills: 3 | Status: SHIPPED | OUTPATIENT
Start: 2019-12-18 | End: 2020-12-17

## 2019-12-18 RX ORDER — DESVENLAFAXINE 100 MG/1
100 TABLET, EXTENDED RELEASE ORAL DAILY
Qty: 30 TABLET | Refills: 11 | Status: SHIPPED | OUTPATIENT
Start: 2019-12-18 | End: 2020-05-13

## 2019-12-18 RX ORDER — CYANOCOBALAMIN 1000 UG/ML
1000 INJECTION, SOLUTION INTRAMUSCULAR; SUBCUTANEOUS
Qty: 30 ML | Refills: 3 | Status: SHIPPED | OUTPATIENT
Start: 2019-12-18 | End: 2020-08-19 | Stop reason: SDUPTHER

## 2019-12-18 RX ORDER — METHYLPREDNISOLONE ACETATE 80 MG/ML
160 INJECTION, SUSPENSION INTRA-ARTICULAR; INTRALESIONAL; INTRAMUSCULAR; SOFT TISSUE
Qty: 6 ML | Refills: 3 | Status: SHIPPED | OUTPATIENT
Start: 2019-12-18 | End: 2020-08-19 | Stop reason: SDUPTHER

## 2019-12-18 RX ORDER — TIZANIDINE 4 MG/1
4 TABLET ORAL EVERY 8 HOURS
Qty: 45 TABLET | Refills: 0 | Status: SHIPPED | OUTPATIENT
Start: 2019-12-18 | End: 2020-01-02

## 2019-12-18 RX ORDER — KETOROLAC TROMETHAMINE 30 MG/ML
INJECTION, SOLUTION INTRAMUSCULAR; INTRAVENOUS
Qty: 2 ML | Refills: 3 | Status: SHIPPED | OUTPATIENT
Start: 2019-12-18 | End: 2020-06-16

## 2019-12-18 ASSESSMENT — ROUTINE ASSESSMENT OF PATIENT INDEX DATA (RAPID3): PSYCHOLOGICAL DISTRESS SCORE: 3.3

## 2019-12-18 NOTE — PATIENT INSTRUCTIONS
Start pristiq 100 in am and 1/2 lexapro  In the pm x 2 weeks  Then drop 10 mg every other day x 2 weeks. Then every other day x 2 weeks

## 2019-12-18 NOTE — PROGRESS NOTES
Subjective:       Patient ID: Angelica Chan is a 64 y.o. female.    Chief Complaint: Pain and Swelling    Follow up:   Lupus and sjogren's syndrome,  She fracture her L knee cap April and right ankle fracture 6/19, she had a boot  X 8 weeks.dexa scan wnl  Patient complains of arthralgias and myalgias for which has been present for a few years. Pain is located in multiple joints, both shoulder(s), both elbow(s), both wrist(s), both MCP(s): 1st, 2nd, 3rd, 4th and 5th, both PIP(s): 1st, 2nd, 3rd, 4th and 5th, both DIP(s): 1st and 2nd, both hip(s), both knee(s) and both MTP(s): 1st, 2nd, 3rd, 4th and 5th, is described as aching, pulsating, shooting and throbbing, and is constant, moderate . She feels better but having back pain. Pain her  B trochanteric bursitis.    Review of Systems   Constitutional: Positive for activity change and fatigue. Negative for appetite change and unexpected weight change.   HENT: Negative for dental problem, ear discharge, ear pain, facial swelling, mouth sores, nosebleeds, postnasal drip, rhinorrhea, sinus pressure, sneezing, tinnitus and voice change.    Eyes: Negative for photophobia, pain, discharge, redness and itching.   Respiratory: Negative for apnea, chest tightness, shortness of breath and wheezing.    Cardiovascular: Positive for leg swelling. Negative for palpitations.   Gastrointestinal: Negative for abdominal distention, constipation and diarrhea.   Endocrine: Negative for cold intolerance, heat intolerance, polydipsia and polyuria.   Genitourinary: Negative for decreased urine volume, difficulty urinating, flank pain, frequency, hematuria and urgency.   Musculoskeletal: Positive for back pain, joint swelling, myalgias and neck stiffness. Negative for gait problem.   Skin: Negative for pallor and wound.        Drainage of incision   Allergic/Immunologic: Negative for immunocompromised state.   Neurological: Negative for dizziness and tremors.   Hematological: Negative for  adenopathy. Does not bruise/bleed easily.   Psychiatric/Behavioral: Negative for sleep disturbance. The patient is not nervous/anxious.          Objective:     BP (!) 151/87   Pulse 71   Resp 12   Wt 100.7 kg (222 lb)   BMI 36.94 kg/m²      Physical Exam   Vitals reviewed.  Constitutional: She is oriented to person, place, and time. No distress.   HENT:   Head: Normocephalic and atraumatic.   Mouth thrush   Eyes: EOM are normal. Pupils are equal, round, and reactive to light. Right eye exhibits no discharge. Left eye exhibits no discharge.   Neck: Neck supple. No thyromegaly present.   Cardiovascular: Normal rate, regular rhythm and normal heart sounds.  Exam reveals no gallop and no friction rub.    No murmur heard.  Pulmonary/Chest: Breath sounds normal. She has no wheezes. She has no rales. She exhibits no tenderness.   Abdominal: There is no tenderness. There is no rebound and no guarding.       Right Side Rheumatological Exam     Examination finds the elbow normal.    The patient is tender to palpation of the shoulder, wrist, knee, 1st PIP, 1st MCP, 2nd PIP, 2nd MCP, 3rd PIP, 3rd MCP, 4th PIP, 4th MCP, 5th PIP and 5th MCP    She has swelling of the 1st PIP, 1st MCP, 2nd PIP, 2nd MCP, 3rd PIP, 3rd MCP, 4th PIP, 4th MCP, 5th PIP and 5th MCP    Shoulder Exam   Tenderness Location: no tenderness    Range of Motion   Active abduction: abnormal   Adduction: abnormal  Sensation: normal    Knee Exam   Patellofemoral Crepitus: positive  Effusion: positive  Sensation: normal    Hip Exam   Tenderness Location: posterior  Sensation: normal    Elbow/Wrist Exam   Tenderness Location: no tenderness  Sensation: normal    Left Side Rheumatological Exam     The patient is tender to palpation of the shoulder, elbow, wrist, knee, 1st PIP, 1st MCP, 2nd PIP, 2nd MCP, 3rd PIP, 3rd MCP, 4th PIP, 4th MCP, 5th PIP and 5th MCP.    She has swelling of the 1st PIP, 1st MCP, 2nd PIP, 2nd MCP, 3rd PIP, 3rd MCP, 4th PIP, 4th MCP, 5th PIP  and 5th MCP    Shoulder Exam   Tenderness Location: no tenderness    Range of Motion   Active abduction: abnormal   Sensation: normal    Knee Exam     Patellofemoral Crepitus: positive  Effusion: positive  Sensation: normal    Hip Exam   Tenderness Location: posterior  Sensation: normal    Elbow/Wrist Exam   Sensation: normal      Back/Neck Exam   General Inspection   Gait: normal         Lymphadenopathy:     She has no cervical adenopathy.   Neurological: She is alert and oriented to person, place, and time. Gait normal.   Skin: Skin is dry. No rash noted. No erythema. No pallor.     Incision and drainage   Psychiatric: Mood and affect normal.   Musculoskeletal: She exhibits tenderness and deformity. She exhibits no edema.       last labs in 6/19-  Assessment:       .  Encounter Diagnoses   Name Primary?    Systemic lupus erythematosus, unspecified SLE type, unspecified organ involvement status Yes    Fibromyalgia     Sjogren's syndrome, with unspecified organ involvement     Lumbar and sacral osteoarthritis     Dizziness     Mixed connective tissue disease     Depression, unspecified depression type     Sinus congestion     Candida albicans infection         Plan:     Systemic lupus erythematosus, unspecified SLE type, unspecified organ involvement status  -     hydroxychloroquine (PLAQUENIL) 200 mg tablet; Take 1 tablet (200 mg total) by mouth 2 (two) times daily.  Dispense: 180 tablet; Refill: 3  -     ketorolac (TORADOL) 60 mg/2 mL Soln; INJECT 2 ML (60MG) INTO THE MUSCLE EVERY SIX WEEKS  Dispense: 2 mL; Refill: 3  -     methylPREDNISolone acetate (DEPO-MEDROL) 80 mg/mL injection; Inject 2 mLs (160 mg total) into the muscle every 30 days.  Dispense: 6 mL; Refill: 3  -     cyanocobalamin 1,000 mcg/mL injection; Inject 1 mL (1,000 mcg total) into the skin every 7 days.  Dispense: 30 mL; Refill: 3  -     X-Ray Lumbar Spine AP And Lateral; Future; Expected date: 12/18/2019  -     X-Ray Knee AP Standing  Bilateral; Future; Expected date: 12/18/2019  -     tiZANidine (ZANAFLEX) 4 MG tablet; Take 1 tablet (4 mg total) by mouth every 8 (eight) hours. for 15 days  Dispense: 45 tablet; Refill: 0  -     Comprehensive metabolic panel; Future; Expected date: 12/18/2019  -     CBC auto differential; Future; Expected date: 12/18/2019  -     C-reactive protein; Future; Expected date: 12/18/2019  -     Sedimentation rate; Future; Expected date: 12/18/2019    Fibromyalgia  -     hydroxychloroquine (PLAQUENIL) 200 mg tablet; Take 1 tablet (200 mg total) by mouth 2 (two) times daily.  Dispense: 180 tablet; Refill: 3  -     ketorolac (TORADOL) 60 mg/2 mL Soln; INJECT 2 ML (60MG) INTO THE MUSCLE EVERY SIX WEEKS  Dispense: 2 mL; Refill: 3  -     methylPREDNISolone acetate (DEPO-MEDROL) 80 mg/mL injection; Inject 2 mLs (160 mg total) into the muscle every 30 days.  Dispense: 6 mL; Refill: 3  -     cyanocobalamin 1,000 mcg/mL injection; Inject 1 mL (1,000 mcg total) into the skin every 7 days.  Dispense: 30 mL; Refill: 3  -     X-Ray Lumbar Spine AP And Lateral; Future; Expected date: 12/18/2019  -     X-Ray Knee AP Standing Bilateral; Future; Expected date: 12/18/2019  -     tiZANidine (ZANAFLEX) 4 MG tablet; Take 1 tablet (4 mg total) by mouth every 8 (eight) hours. for 15 days  Dispense: 45 tablet; Refill: 0  -     Comprehensive metabolic panel; Future; Expected date: 12/18/2019  -     CBC auto differential; Future; Expected date: 12/18/2019  -     C-reactive protein; Future; Expected date: 12/18/2019  -     Sedimentation rate; Future; Expected date: 12/18/2019    Sjogren's syndrome, with unspecified organ involvement  -     hydroxychloroquine (PLAQUENIL) 200 mg tablet; Take 1 tablet (200 mg total) by mouth 2 (two) times daily.  Dispense: 180 tablet; Refill: 3  -     ketorolac (TORADOL) 60 mg/2 mL Soln; INJECT 2 ML (60MG) INTO THE MUSCLE EVERY SIX WEEKS  Dispense: 2 mL; Refill: 3  -     methylPREDNISolone acetate (DEPO-MEDROL) 80  mg/mL injection; Inject 2 mLs (160 mg total) into the muscle every 30 days.  Dispense: 6 mL; Refill: 3  -     cyanocobalamin 1,000 mcg/mL injection; Inject 1 mL (1,000 mcg total) into the skin every 7 days.  Dispense: 30 mL; Refill: 3  -     X-Ray Lumbar Spine AP And Lateral; Future; Expected date: 12/18/2019  -     X-Ray Knee AP Standing Bilateral; Future; Expected date: 12/18/2019  -     tiZANidine (ZANAFLEX) 4 MG tablet; Take 1 tablet (4 mg total) by mouth every 8 (eight) hours. for 15 days  Dispense: 45 tablet; Refill: 0  -     Comprehensive metabolic panel; Future; Expected date: 12/18/2019  -     CBC auto differential; Future; Expected date: 12/18/2019  -     C-reactive protein; Future; Expected date: 12/18/2019  -     Sedimentation rate; Future; Expected date: 12/18/2019    Lumbar and sacral osteoarthritis  -     hydroxychloroquine (PLAQUENIL) 200 mg tablet; Take 1 tablet (200 mg total) by mouth 2 (two) times daily.  Dispense: 180 tablet; Refill: 3  -     ketorolac (TORADOL) 60 mg/2 mL Soln; INJECT 2 ML (60MG) INTO THE MUSCLE EVERY SIX WEEKS  Dispense: 2 mL; Refill: 3  -     methylPREDNISolone acetate (DEPO-MEDROL) 80 mg/mL injection; Inject 2 mLs (160 mg total) into the muscle every 30 days.  Dispense: 6 mL; Refill: 3  -     cyanocobalamin 1,000 mcg/mL injection; Inject 1 mL (1,000 mcg total) into the skin every 7 days.  Dispense: 30 mL; Refill: 3  -     X-Ray Lumbar Spine AP And Lateral; Future; Expected date: 12/18/2019  -     X-Ray Knee AP Standing Bilateral; Future; Expected date: 12/18/2019  -     tiZANidine (ZANAFLEX) 4 MG tablet; Take 1 tablet (4 mg total) by mouth every 8 (eight) hours. for 15 days  Dispense: 45 tablet; Refill: 0  -     Comprehensive metabolic panel; Future; Expected date: 12/18/2019  -     CBC auto differential; Future; Expected date: 12/18/2019  -     C-reactive protein; Future; Expected date: 12/18/2019  -     Sedimentation rate; Future; Expected date: 12/18/2019    Dizziness  -      ketorolac (TORADOL) 60 mg/2 mL Soln; INJECT 2 ML (60MG) INTO THE MUSCLE EVERY SIX WEEKS  Dispense: 2 mL; Refill: 3  -     methylPREDNISolone acetate (DEPO-MEDROL) 80 mg/mL injection; Inject 2 mLs (160 mg total) into the muscle every 30 days.  Dispense: 6 mL; Refill: 3  -     cyanocobalamin 1,000 mcg/mL injection; Inject 1 mL (1,000 mcg total) into the skin every 7 days.  Dispense: 30 mL; Refill: 3  -     X-Ray Lumbar Spine AP And Lateral; Future; Expected date: 12/18/2019  -     X-Ray Knee AP Standing Bilateral; Future; Expected date: 12/18/2019  -     tiZANidine (ZANAFLEX) 4 MG tablet; Take 1 tablet (4 mg total) by mouth every 8 (eight) hours. for 15 days  Dispense: 45 tablet; Refill: 0    Mixed connective tissue disease  -     X-Ray Lumbar Spine AP And Lateral; Future; Expected date: 12/18/2019  -     X-Ray Knee AP Standing Bilateral; Future; Expected date: 12/18/2019  -     tiZANidine (ZANAFLEX) 4 MG tablet; Take 1 tablet (4 mg total) by mouth every 8 (eight) hours. for 15 days  Dispense: 45 tablet; Refill: 0  -     Comprehensive metabolic panel; Future; Expected date: 12/18/2019  -     CBC auto differential; Future; Expected date: 12/18/2019  -     C-reactive protein; Future; Expected date: 12/18/2019  -     Sedimentation rate; Future; Expected date: 12/18/2019    Depression, unspecified depression type  -     X-Ray Lumbar Spine AP And Lateral; Future; Expected date: 12/18/2019  -     X-Ray Knee AP Standing Bilateral; Future; Expected date: 12/18/2019  -     tiZANidine (ZANAFLEX) 4 MG tablet; Take 1 tablet (4 mg total) by mouth every 8 (eight) hours. for 15 days  Dispense: 45 tablet; Refill: 0  -     Comprehensive metabolic panel; Future; Expected date: 12/18/2019  -     CBC auto differential; Future; Expected date: 12/18/2019  -     C-reactive protein; Future; Expected date: 12/18/2019  -     Sedimentation rate; Future; Expected date: 12/18/2019    Sinus congestion  -     ketorolac (TORADOL) 60 mg/2 mL Soln;  INJECT 2 ML (60MG) INTO THE MUSCLE EVERY SIX WEEKS  Dispense: 2 mL; Refill: 3  -     methylPREDNISolone acetate (DEPO-MEDROL) 80 mg/mL injection; Inject 2 mLs (160 mg total) into the muscle every 30 days.  Dispense: 6 mL; Refill: 3  -     cyanocobalamin 1,000 mcg/mL injection; Inject 1 mL (1,000 mcg total) into the skin every 7 days.  Dispense: 30 mL; Refill: 3    Candida albicans infection  -     ketorolac (TORADOL) 60 mg/2 mL Soln; INJECT 2 ML (60MG) INTO THE MUSCLE EVERY SIX WEEKS  Dispense: 2 mL; Refill: 3  -     methylPREDNISolone acetate (DEPO-MEDROL) 80 mg/mL injection; Inject 2 mLs (160 mg total) into the muscle every 30 days.  Dispense: 6 mL; Refill: 3  -     cyanocobalamin 1,000 mcg/mL injection; Inject 1 mL (1,000 mcg total) into the skin every 7 days.  Dispense: 30 mL; Refill: 3    Other orders  -     desvenlafaxine succinate (PRISTIQ) 100 MG Tb24; Take 1 tablet (100 mg total) by mouth once daily.  Dispense: 30 tablet; Refill: 11      We will check  Labs and  Continue treatment,  Wean lexapro add pristiq pt given a wean   add prn zanaflex muscle relaxer, continue plaquenil and neurontin, xray l spine and knee. Pt has a set of labsahe, she may need l spine injections or at least MRI.over 50% of this visit was explain labs and possible disease states and course of treatments

## 2019-12-30 ENCOUNTER — TELEPHONE (OUTPATIENT)
Dept: RHEUMATOLOGY | Facility: CLINIC | Age: 64
End: 2019-12-30

## 2019-12-31 NOTE — TELEPHONE ENCOUNTER
Returned patient call and informed lab results were received but no xray results. Patient will call [facility] and have results faxed to office

## 2019-12-31 NOTE — TELEPHONE ENCOUNTER
----- Message from Devendra Trujillo sent at 12/30/2019  4:02 PM CST -----  Contact: pt  Type: Needs Medical Advice    Who Called:  pt    Best Call Back Number: [phone]  Additional Information: pt wanted to know if her Xrays and labs were received from [facility]. They were to be faxed. The test were done about a week ago. Pt is unsure when or if they were sent.

## 2020-01-16 ENCOUNTER — TELEPHONE (OUTPATIENT)
Dept: RHEUMATOLOGY | Facility: CLINIC | Age: 65
End: 2020-01-16

## 2020-01-16 NOTE — TELEPHONE ENCOUNTER
----- Message from Aury Nieves sent at 1/16/2020  2:57 PM CST -----  Contact: self  Patient is requesting acall back to see if the results of xrays and labs were sent to you, test done on 12/27 and would have come from [facility] in [place], [place].  Call back at [phone] (home) . Thanks

## 2020-01-16 NOTE — TELEPHONE ENCOUNTER
Returned patient call regarding xray results. Informed that office has received lab results but not xray results. Patient stated will call hospital again to send xray results to office.

## 2020-01-22 ENCOUNTER — TELEPHONE (OUTPATIENT)
Dept: RHEUMATOLOGY | Facility: CLINIC | Age: 65
End: 2020-01-22

## 2020-01-22 NOTE — TELEPHONE ENCOUNTER
Returned patient call regarding results. Informed Dr Moctezuma has reviewed and labs are stable, no abnormalities on xray of knees. Advised patient to see orthopedic doctor for evaluation. Patient verbalized understanding.

## 2020-01-22 NOTE — TELEPHONE ENCOUNTER
----- Message from Cj Ring sent at 1/22/2020 11:02 AM CST -----  Contact: patient  Type: Needs Medical Advice    Who Called:  Patient  Symptoms (please be specific):    How long has patient had these symptoms:    Pharmacy name and phone #:   Best Call Back Number: [phone]  Additional Information: requesting a call back regarding blood work results,and wanted to advise that she had her xray report faxed over to

## 2020-01-31 ENCOUNTER — TELEPHONE (OUTPATIENT)
Dept: RHEUMATOLOGY | Facility: CLINIC | Age: 65
End: 2020-01-31

## 2020-01-31 NOTE — TELEPHONE ENCOUNTER
----- Message from Tiffanie Atkins sent at 1/31/2020  3:37 PM CST -----  Contact: patient  Type: Needs Medical Advice    Who Called:  Patient  Best Call Back Number:    Additional Information: Per patient called hospital in [place] where she had xrays done and wanted to know if you have received them yet-Requesting a call back-please advise-thank you

## 2020-02-10 DIAGNOSIS — M35.00 SJOGREN'S SYNDROME, WITH UNSPECIFIED ORGAN INVOLVEMENT: ICD-10-CM

## 2020-02-10 DIAGNOSIS — M47.817 LUMBAR AND SACRAL OSTEOARTHRITIS: ICD-10-CM

## 2020-02-10 DIAGNOSIS — M32.9 SYSTEMIC LUPUS ERYTHEMATOSUS, UNSPECIFIED SLE TYPE, UNSPECIFIED ORGAN INVOLVEMENT STATUS: ICD-10-CM

## 2020-02-10 DIAGNOSIS — M79.7 FIBROMYALGIA: ICD-10-CM

## 2020-02-10 RX ORDER — GABAPENTIN 100 MG/1
CAPSULE ORAL
Qty: 270 CAPSULE | Refills: 3 | Status: SHIPPED | OUTPATIENT
Start: 2020-02-10 | End: 2020-12-18 | Stop reason: SDUPTHER

## 2020-02-20 ENCOUNTER — OFFICE (OUTPATIENT)
Dept: URBAN - METROPOLITAN AREA CLINIC 33 | Facility: CLINIC | Age: 65
End: 2020-02-20
Payer: COMMERCIAL

## 2020-02-20 ENCOUNTER — TELEPHONE (OUTPATIENT)
Dept: RHEUMATOLOGY | Facility: CLINIC | Age: 65
End: 2020-02-20

## 2020-02-20 VITALS
WEIGHT: 219.6 LBS | TEMPERATURE: 97 F | HEART RATE: 68 BPM | HEIGHT: 65 IN | DIASTOLIC BLOOD PRESSURE: 80 MMHG | SYSTOLIC BLOOD PRESSURE: 123 MMHG

## 2020-02-20 DIAGNOSIS — J45.909 UNSPECIFIED ASTHMA, UNCOMPLICATED: ICD-10-CM

## 2020-02-20 DIAGNOSIS — K21.0 GASTRO-ESOPHAGEAL REFLUX DISEASE WITH ESOPHAGITIS: ICD-10-CM

## 2020-02-20 DIAGNOSIS — R11.0 NAUSEA: ICD-10-CM

## 2020-02-20 PROBLEM — K20.9: Status: ACTIVE | Noted: 2019-09-25

## 2020-02-20 PROCEDURE — 99214 OFFICE O/P EST MOD 30 MIN: CPT

## 2020-02-20 NOTE — TELEPHONE ENCOUNTER
Spoke to pt, she is inquiring about her xray results that were received on 1/31/2020. She advises she has never heard anything back about the results. Advised nurse will discuss with Dr. Moctezuma when she returns on Monday. No further questions.

## 2020-02-20 NOTE — SERVICEHPINOTES
NGOC LIMA   is a   63 yo white female who is here for f/u concerning GERD. She was last seen in 12/2019 for f/u given switch from Nexium to Dexilant 60 mg qd and she was doing better on Dexilant despite some breakthrough stomach burning sensation thus, advised Dexilant with Pepcid. She reports only taking Dexilant 60 mg qd at this time and doing well given no longer having nausea or abdominal pain. She is no longer drinking 2 cups of coffee a day: Only takes 1/2 cup now. She also informs that her asthma has been better controlled as evident by resolution of her chronic cough and voice hoarseness. Last EGD 09/2019 found mild chronic gastritis (bx benign), grade A esophagitis, and GEJunx inflammation (bx IM). Moderate chronic NSAID use: Celebrex for arthritis. Denies n/v, abdominal pain, change in bowel habits, diarrhea, melena, rectal bleeding, weight loss. Note from last visit: She also notes a very "nervous" stomach which she wonders if this is a contributing factor to her chronic pain in the epigastrium since the age of 18. Stress and spicy foods can exacerbate symptoms. Drinking soda seems to "settle" things down. She feels a globus sensation. She has throat burning which she believes is attributed to taking her inhalers. She has burning in her chest. No vomiting and she is constantly nausea feels better w/PO intake.

## 2020-02-20 NOTE — TELEPHONE ENCOUNTER
----- Message from Cory Vazquez sent at 2/20/2020  3:32 PM CST -----  Contact: Pt  Type:  Patient Returning Call    Who Called: Pt  Does the patient know what this is regarding?:  Pt would like for a nurse to give her a call back as soon as possible.  Best Call Back Number:    Additional Information:  Please call pt back to be advised. Thank you

## 2020-02-24 DIAGNOSIS — M47.817 LUMBAR AND SACRAL OSTEOARTHRITIS: ICD-10-CM

## 2020-02-24 DIAGNOSIS — M35.1 MIXED CONNECTIVE TISSUE DISEASE: ICD-10-CM

## 2020-02-24 DIAGNOSIS — I10 ESSENTIAL HYPERTENSION: ICD-10-CM

## 2020-02-24 DIAGNOSIS — M79.7 FIBROMYALGIA: ICD-10-CM

## 2020-02-24 DIAGNOSIS — M35.00 SJOGREN'S SYNDROME, WITH UNSPECIFIED ORGAN INVOLVEMENT: ICD-10-CM

## 2020-02-24 DIAGNOSIS — M32.9 SYSTEMIC LUPUS ERYTHEMATOSUS, UNSPECIFIED SLE TYPE, UNSPECIFIED ORGAN INVOLVEMENT STATUS: ICD-10-CM

## 2020-02-27 RX ORDER — ESCITALOPRAM OXALATE 20 MG/1
20 TABLET ORAL DAILY
Qty: 90 TABLET | Refills: 1 | Status: SHIPPED | OUTPATIENT
Start: 2020-02-27 | End: 2020-07-20 | Stop reason: SDUPTHER

## 2020-02-27 RX ORDER — METOPROLOL TARTRATE 100 MG/1
100 TABLET ORAL 2 TIMES DAILY
Qty: 180 TABLET | Refills: 1 | Status: SHIPPED | OUTPATIENT
Start: 2020-02-27 | End: 2020-07-20 | Stop reason: SDUPTHER

## 2020-04-29 DIAGNOSIS — M35.00 SJOGREN'S SYNDROME, WITH UNSPECIFIED ORGAN INVOLVEMENT: ICD-10-CM

## 2020-04-29 RX ORDER — SULFAMETHOXAZOLE AND TRIMETHOPRIM 800; 160 MG/1; MG/1
TABLET ORAL
Qty: 40 TABLET | Refills: 0 | Status: SHIPPED | OUTPATIENT
Start: 2020-04-29 | End: 2020-05-13

## 2020-04-29 RX ORDER — FLUCONAZOLE 150 MG/1
TABLET ORAL
Qty: 5 TABLET | Refills: 2 | Status: SHIPPED | OUTPATIENT
Start: 2020-04-29 | End: 2021-03-17 | Stop reason: SDUPTHER

## 2020-05-06 ENCOUNTER — TELEPHONE (OUTPATIENT)
Dept: RHEUMATOLOGY | Facility: CLINIC | Age: 65
End: 2020-05-06

## 2020-05-06 NOTE — TELEPHONE ENCOUNTER
Returned patient call and informed office was calling about up coming appointment with Damari. Informed office is only doing virtual or telephone visits at this time. Patient agreed to do telephone visit with Damari for same day and time. Nurse informed that she will need to be in the state [facility] for visit. Patient will drive across state line into [place] for visit.

## 2020-05-06 NOTE — TELEPHONE ENCOUNTER
----- Message from Casie Woody MA sent at 5/6/2020  3:23 PM CDT -----  Contact: Patient   Type: Needs Medical Advice    Who Called:  Patient   Patient was returning a phone call to the office. She is unsure of why or who called.   Best Call Back Number: [phone]

## 2020-05-12 ENCOUNTER — TELEPHONE (OUTPATIENT)
Dept: RHEUMATOLOGY | Facility: CLINIC | Age: 65
End: 2020-05-12

## 2020-05-12 NOTE — TELEPHONE ENCOUNTER
----- Message from Tiffanie Atkins sent at 5/12/2020 11:50 AM CDT -----  Contact: patient  Type: Needs Medical Advice  Who Called:  patient  Best Call Back Number:   Additional Information: Per patient requesting a call back to discuss her appointment tomorrow

## 2020-05-12 NOTE — TELEPHONE ENCOUNTER
Returned patient call regarding tomorrow appointment. Patient stated received a phone call informing to come to clinic tomorrow for appointment. Nurse informed office still not seeing patients in clinic as of yet. Patient is aware appointment tomorrow is for a phone call visit with Damari. Patient is aware will need to drive to [place] for visit.

## 2020-05-13 ENCOUNTER — OFFICE VISIT (OUTPATIENT)
Dept: RHEUMATOLOGY | Facility: CLINIC | Age: 65
End: 2020-05-13
Payer: MEDICARE

## 2020-05-13 VITALS — WEIGHT: 222 LBS | HEIGHT: 65 IN | BODY MASS INDEX: 36.99 KG/M2

## 2020-05-13 DIAGNOSIS — M35.00 SJOGREN'S SYNDROME, WITH UNSPECIFIED ORGAN INVOLVEMENT: ICD-10-CM

## 2020-05-13 DIAGNOSIS — G89.4 CHRONIC PAIN SYNDROME: ICD-10-CM

## 2020-05-13 DIAGNOSIS — M79.7 FIBROMYALGIA: ICD-10-CM

## 2020-05-13 DIAGNOSIS — Z79.899 HIGH RISK MEDICATION USE: ICD-10-CM

## 2020-05-13 DIAGNOSIS — M47.817 LUMBAR AND SACRAL OSTEOARTHRITIS: ICD-10-CM

## 2020-05-13 DIAGNOSIS — M32.9 SYSTEMIC LUPUS ERYTHEMATOSUS, UNSPECIFIED SLE TYPE, UNSPECIFIED ORGAN INVOLVEMENT STATUS: Primary | ICD-10-CM

## 2020-05-13 PROCEDURE — 99443 PR PHYSICIAN TELEPHONE EVALUATION 21-30 MIN: CPT | Mod: 95,,, | Performed by: PHYSICIAN ASSISTANT

## 2020-05-13 PROCEDURE — 99443 PR PHYSICIAN TELEPHONE EVALUATION 21-30 MIN: ICD-10-PCS | Mod: 95,,, | Performed by: PHYSICIAN ASSISTANT

## 2020-05-13 NOTE — PROGRESS NOTES
Established Patient - Audio Only Telehealth Visit     The patient location is: [place] [facility]  The chief complaint leading to consultation is: SLE  Visit type: Virtual visit with audio only (telephone)  Total time spent with patient: 22 minutes       The reason for the audio only service rather than synchronous audio and video virtual visit was related to technical difficulties or patient preference/necessity.     Each patient to whom I provide medical services by telemedicine is:  (1) informed of the relationship between the physician and patient and the respective role of any other health care provider with respect to management of the patient; and (2) notified that they may decline to receive medical services by telemedicine and may withdraw from such care at any time. Patient verbally consented to receive this service via voice-only telephone call.  This service was not originating from a related E/M service provided within the previous 7 days nor will  to an E/M service or procedure within the next 24 hours or my soonest available appointment.  Prevailing standard of care was able to be met in this audio-only visit.      Subjective:       Patient ID: Angelica Chan is a 64 y.o. female.    Chief Complaint: Disease Management and Lupus    Mrs. Chan is a 64 year old female who presents to virtual audio visit for follow up on SLE and Sjogren's syndrome. She is a new patient to me. She has been doing fair since her last visit. She complains of joint pain involving her wrists, ankles, hips, and low back. Low back pain is constant, aching, with radiation down the R leg. Currently on gabapentin 100 mg TID, but pain is still severe and sharp. Reports previous MRI was abnormal and x-ray was ordered at last visit, but not sure if it completed? Not interested in pain management or PT at si time. Other sx include malar rash, hair loss, oral/nasal ulcers, dry eyes, dry mouth, and fatigue. She did not  tolerate pristiq and resumed lexapro, but anxiety is stable. She is doing toradol/depo injections every 5-6 weeks PRN.     Current tx:  1. Plaquenil 200 mg bid    Review of Systems   Constitutional: Positive for activity change and fatigue. Negative for appetite change, chills and fever.   HENT:        Oral/nasal ulcers   Eyes: Negative for visual disturbance.        Dry eyes   Respiratory: Negative for cough and shortness of breath.    Cardiovascular: Negative for chest pain, palpitations and leg swelling.   Gastrointestinal: Negative for abdominal pain, constipation, diarrhea, nausea and vomiting.   Musculoskeletal: Positive for arthralgias and back pain.   Skin:        Hair loss   Allergic/Immunologic: Negative for immunocompromised state.   Neurological: Negative for dizziness, weakness, light-headedness and headaches.   Psychiatric/Behavioral: Negative for dysphoric mood. The patient is not nervous/anxious.          Objective:   There were no vitals filed for this visit.    Past Medical History:   Diagnosis Date    Acid reflux     Anxiety     Arthritis     Degenerative disc disease     Depression     Dry eyes     Dry mouth     Hypertension      Past Surgical History:   Procedure Laterality Date    APPENDECTOMY      HYSTERECTOMY      LIPOMA RESECTION      c spine area    THORACIC DISCECTOMY      disc rupture    TONSILLECTOMY            Physical Exam   Constitutional: She is oriented to person, place, and time.   Neurological: She is alert and oriented to person, place, and time.       No new labs to review    X-ray knee: normal   Assessment:       1. Systemic lupus erythematosus, unspecified SLE type, unspecified organ involvement status    2. Sjogren's syndrome, with unspecified organ involvement    3. Fibromyalgia    4. Lumbar and sacral osteoarthritis    5. High risk medication use    6. Chronic pain syndrome            Plan:       Systemic lupus erythematosus, unspecified SLE type, unspecified  organ involvement status    Sjogren's syndrome, with unspecified organ involvement    Fibromyalgia    Lumbar and sacral osteoarthritis    High risk medication use    Chronic pain syndrome        Assessment:  64 year old female with  SLE, Sjogren's syndrome on plaquenil  --fibromyalgia  --lumbar and sacral arthritis  --chronic pain syndrome on tylenol 3 sparingly    Plan:  1. Change gabapentin 300 mg nightly instead of 100 mg tid. Recommended PT or pain management for back pain, pt declined. Will request x-ray from [facility].  2. Cont. Plaquenil 200 mg bid  3. Check labs within the next month if possible due to covid 19  4. Cont. Toradol/depo every 6-8 wks prn  5. Tylenol #3 pended for Dr. Moctezuma.  I have checked [place] prescription monitoring program site and no unusual or abnormal behavior has occurred pt understand the risk and benefits of taking opioid medications and has decided to continue the medication.    Follow up:  3-4 months Dr. Moctezuma

## 2020-05-19 ENCOUNTER — TELEPHONE (OUTPATIENT)
Dept: RHEUMATOLOGY | Facility: CLINIC | Age: 65
End: 2020-05-19

## 2020-05-19 NOTE — TELEPHONE ENCOUNTER
----- Message from Damari Vang PA-C sent at 5/13/2020  3:28 PM CDT -----  Do we have a copy of x-ray of lumbar spine from [facility]?

## 2020-07-20 DIAGNOSIS — M79.7 FIBROMYALGIA: ICD-10-CM

## 2020-07-20 DIAGNOSIS — I10 ESSENTIAL HYPERTENSION: ICD-10-CM

## 2020-07-20 RX ORDER — METOPROLOL TARTRATE 100 MG/1
100 TABLET ORAL 2 TIMES DAILY
Qty: 180 TABLET | Refills: 1 | Status: SHIPPED | OUTPATIENT
Start: 2020-07-20 | End: 2020-12-24 | Stop reason: SDUPTHER

## 2020-07-20 RX ORDER — ESCITALOPRAM OXALATE 20 MG/1
20 TABLET ORAL DAILY
Qty: 90 TABLET | Refills: 1 | Status: SHIPPED | OUTPATIENT
Start: 2020-07-20 | End: 2020-12-24 | Stop reason: SDUPTHER

## 2020-08-19 ENCOUNTER — OFFICE VISIT (OUTPATIENT)
Dept: RHEUMATOLOGY | Facility: CLINIC | Age: 65
End: 2020-08-19
Payer: MEDICARE

## 2020-08-19 VITALS
WEIGHT: 202 LBS | HEIGHT: 65 IN | HEART RATE: 74 BPM | SYSTOLIC BLOOD PRESSURE: 125 MMHG | DIASTOLIC BLOOD PRESSURE: 81 MMHG | BODY MASS INDEX: 33.66 KG/M2

## 2020-08-19 DIAGNOSIS — M47.817 LUMBAR AND SACRAL OSTEOARTHRITIS: ICD-10-CM

## 2020-08-19 DIAGNOSIS — G89.4 CHRONIC PAIN SYNDROME: ICD-10-CM

## 2020-08-19 DIAGNOSIS — M79.7 FIBROMYALGIA: ICD-10-CM

## 2020-08-19 DIAGNOSIS — B37.9 CANDIDA ALBICANS INFECTION: ICD-10-CM

## 2020-08-19 DIAGNOSIS — R09.81 SINUS CONGESTION: ICD-10-CM

## 2020-08-19 DIAGNOSIS — M35.00 SJOGREN'S SYNDROME, WITH UNSPECIFIED ORGAN INVOLVEMENT: ICD-10-CM

## 2020-08-19 DIAGNOSIS — R42 DIZZINESS: ICD-10-CM

## 2020-08-19 DIAGNOSIS — M32.9 SYSTEMIC LUPUS ERYTHEMATOSUS, UNSPECIFIED SLE TYPE, UNSPECIFIED ORGAN INVOLVEMENT STATUS: Primary | ICD-10-CM

## 2020-08-19 PROCEDURE — 99999 PR PBB SHADOW E&M-EST. PATIENT-LVL III: ICD-10-PCS | Mod: PBBFAC,,, | Performed by: INTERNAL MEDICINE

## 2020-08-19 PROCEDURE — 99999 PR PBB SHADOW E&M-EST. PATIENT-LVL III: CPT | Mod: PBBFAC,,, | Performed by: INTERNAL MEDICINE

## 2020-08-19 PROCEDURE — 99214 PR OFFICE/OUTPT VISIT, EST, LEVL IV, 30-39 MIN: ICD-10-PCS | Mod: S$PBB,,, | Performed by: INTERNAL MEDICINE

## 2020-08-19 PROCEDURE — 99213 OFFICE O/P EST LOW 20 MIN: CPT | Mod: PBBFAC,PO | Performed by: INTERNAL MEDICINE

## 2020-08-19 PROCEDURE — 99214 OFFICE O/P EST MOD 30 MIN: CPT | Mod: S$PBB,,, | Performed by: INTERNAL MEDICINE

## 2020-08-19 RX ORDER — CYANOCOBALAMIN 1000 UG/ML
1000 INJECTION, SOLUTION INTRAMUSCULAR; SUBCUTANEOUS
Qty: 30 ML | Refills: 3 | Status: SHIPPED | OUTPATIENT
Start: 2020-08-19 | End: 2021-03-23 | Stop reason: SDUPTHER

## 2020-08-19 RX ORDER — SYRINGE REUSABLE, 3 ML
1 SYRINGE, REUSABLE MISCELLANEOUS DAILY
Qty: 100 EACH | Refills: 3 | Status: SHIPPED | OUTPATIENT
Start: 2020-08-19 | End: 2021-11-30 | Stop reason: SDUPTHER

## 2020-08-19 RX ORDER — NEEDLES, DISPOSABLE 25GX5/8"
1 NEEDLE, DISPOSABLE MISCELLANEOUS WEEKLY
Qty: 25 EACH | Refills: 3 | Status: SHIPPED | OUTPATIENT
Start: 2020-08-19 | End: 2021-11-30 | Stop reason: SDUPTHER

## 2020-08-19 RX ORDER — METHYLPREDNISOLONE ACETATE 80 MG/ML
160 INJECTION, SUSPENSION INTRA-ARTICULAR; INTRALESIONAL; INTRAMUSCULAR; SOFT TISSUE
Qty: 6 ML | Refills: 3 | Status: SHIPPED | OUTPATIENT
Start: 2020-08-19 | End: 2020-09-18

## 2020-08-19 RX ORDER — KETOROLAC TROMETHAMINE 30 MG/ML
INJECTION, SOLUTION INTRAMUSCULAR; INTRAVENOUS
Qty: 2 ML | Refills: 6 | Status: SHIPPED | OUTPATIENT
Start: 2020-08-19 | End: 2021-03-23 | Stop reason: SDUPTHER

## 2020-08-19 RX ORDER — ACETAMINOPHEN AND CODEINE PHOSPHATE 300; 30 MG/1; MG/1
1 TABLET ORAL EVERY 8 HOURS PRN
Qty: 90 TABLET | Refills: 1 | Status: SHIPPED | OUTPATIENT
Start: 2020-08-19 | End: 2020-09-18

## 2020-08-19 ASSESSMENT — ROUTINE ASSESSMENT OF PATIENT INDEX DATA (RAPID3)
FATIGUE SCORE: 1.1
PAIN SCORE: 7.5
MDHAQ FUNCTION SCORE: 0.9
PATIENT GLOBAL ASSESSMENT SCORE: 5
TOTAL RAPID3 SCORE: 5.17
PSYCHOLOGICAL DISTRESS SCORE: 2.2

## 2020-08-19 NOTE — PROGRESS NOTES
Subjective:       Patient ID: Angelica Chan is a 64 y.o. female.    Chief Complaint: Disease Management and Lupus (sle)    Follow up: 64 year old female  SLE and Sjogren's syndrome.  She has been doing fair, she has been taking care  Grand daughter since her last visit. She complains of joint pain involving her wrists, ankles, hips, and low back. Low back pain is constant, aching, with radiation down the R leg. Currently on gabapentin 100 mg TID, but pain is still severe and sharp. Reports previous MRI was abnormal and x-ray was ordered at last visit, but not sure if it completed? Not interested in pain management or PT at si time. Other sx include malar rash, hair loss, oral/nasal ulcers, dry eyes, dry mouth, and fatigue. She did not tolerate pristiq and resumed lexapro, but anxiety is stable. She is doing toradol/depo injections every 5-6 weeks PRN.     Current tx:  1. Plaquenil 200 mg bid            She complains of joint swelling. Associated symptoms include fatigue and myalgias. Pertinent negatives include no fever or headaches.     Her past medical history is significant for lupus.   Lupus  Associated symptoms include arthralgias, fatigue, joint swelling and myalgias. Pertinent negatives include no abdominal pain, chest pain, chills, coughing, fever, headaches, nausea, vomiting or weakness.     Review of Systems   Constitutional: Positive for activity change and fatigue. Negative for appetite change, chills and fever.   HENT:        Oral/nasal ulcers   Eyes: Negative for visual disturbance.        Dry eyes   Respiratory: Negative for cough and shortness of breath.    Cardiovascular: Negative for chest pain, palpitations and leg swelling.   Gastrointestinal: Negative for abdominal pain, constipation, diarrhea, nausea and vomiting.   Musculoskeletal: Positive for arthralgias, back pain, joint swelling, myalgias and neck stiffness.   Skin:        Hair loss   Allergic/Immunologic: Negative for  immunocompromised state.   Neurological: Negative for dizziness, weakness, light-headedness and headaches.   Psychiatric/Behavioral: Negative for dysphoric mood. The patient is not nervous/anxious.          Objective:     Vitals:    08/19/20 1412   BP: 125/81   Pulse: 74       Past Medical History:   Diagnosis Date    Acid reflux     Anxiety     Arthritis     Degenerative disc disease     Depression     Dry eyes     Dry mouth     Hypertension      Past Surgical History:   Procedure Laterality Date    APPENDECTOMY      HYSTERECTOMY      LIPOMA RESECTION      c spine area    THORACIC DISCECTOMY      disc rupture    TONSILLECTOMY            Physical Exam   Vitals reviewed.  Constitutional: She is oriented to person, place, and time. No distress.   HENT:   Head: Normocephalic and atraumatic.   Eyes: EOM are normal. Pupils are equal, round, and reactive to light. Right eye exhibits no discharge. Left eye exhibits no discharge.   Neck: Neck supple. No thyromegaly present.   Cardiovascular: Normal rate, regular rhythm and normal heart sounds.  Exam reveals no gallop and no friction rub.    No murmur heard.  Pulmonary/Chest: Breath sounds normal. She has no wheezes. She has no rales. She exhibits no tenderness.   Abdominal: There is no abdominal tenderness. There is no rebound and no guarding.       Right Side Rheumatological Exam     Examination finds the elbow normal.    The patient is tender to palpation of the shoulder, wrist, knee, 1st PIP, 1st MCP, 2nd PIP, 2nd MCP, 3rd PIP, 3rd MCP, 4th PIP, 4th MCP, 5th PIP and 5th MCP    She has swelling of the 1st PIP, 1st MCP, 2nd PIP, 2nd MCP, 3rd PIP, 3rd MCP, 4th PIP, 4th MCP, 5th PIP and 5th MCP    Shoulder Exam   Tenderness Location: no tenderness    Range of Motion   Active abduction: abnormal   Adduction: abnormal  Sensation: normal    Knee Exam   Patellofemoral Crepitus: positive  Effusion: positive  Sensation: normal    Hip Exam   Tenderness Location:  posterior  Sensation: normal    Elbow/Wrist Exam   Tenderness Location: no tenderness  Sensation: normal    Left Side Rheumatological Exam     The patient is tender to palpation of the shoulder, elbow, wrist, knee, 1st PIP, 1st MCP, 2nd PIP, 2nd MCP, 3rd PIP, 3rd MCP, 4th PIP, 4th MCP, 5th PIP and 5th MCP.    She has swelling of the 1st PIP, 1st MCP, 2nd PIP, 2nd MCP, 3rd PIP, 3rd MCP, 4th PIP, 4th MCP, 5th PIP and 5th MCP    Shoulder Exam   Tenderness Location: no tenderness    Range of Motion   Active abduction: abnormal   Sensation: normal    Knee Exam     Patellofemoral Crepitus: positive  Effusion: positive  Sensation: normal    Hip Exam   Tenderness Location: posterior  Sensation: normal    Elbow/Wrist Exam   Sensation: normal      Back/Neck Exam   General Inspection   Gait: normal         Lymphadenopathy:     She has no cervical adenopathy.   Neurological: She is alert and oriented to person, place, and time. Gait normal.   Skin: Skin is dry. No rash noted. No erythema. There is pallor.     Psychiatric: Mood and affect normal.   Musculoskeletal: Tenderness and deformity present.         labs  Done [place]  Assessment:       1. Systemic lupus erythematosus, unspecified SLE type, unspecified organ involvement status    2. Sjogren's syndrome, with unspecified organ involvement    3. Fibromyalgia    4. Lumbar and sacral osteoarthritis    5. Chronic pain syndrome            Plan:       Systemic lupus erythematosus, unspecified SLE type, unspecified organ involvement status  -     CBC auto differential; Future; Expected date: 08/19/2020  -     Comprehensive metabolic panel; Future; Expected date: 08/19/2020  -     MARTI Screen w/Reflex; Future; Expected date: 08/19/2020  -     Sjogrens syndrome-A extractable nuclear antibody; Future; Expected date: 08/19/2020  -     Sjogrens syndrome-B extractable nuclear antibody; Future; Expected date: 08/19/2020  -     Sedimentation rate; Future; Expected date:  08/19/2020  -     C-Reactive Protein; Future; Expected date: 08/19/2020  -     TSH; Future; Expected date: 08/19/2020  -     T3, free; Future; Expected date: 08/19/2020  -     T4, free; Future; Expected date: 08/19/2020    Sjogren's syndrome, with unspecified organ involvement  -     CBC auto differential; Future; Expected date: 08/19/2020  -     Comprehensive metabolic panel; Future; Expected date: 08/19/2020  -     MARTI Screen w/Reflex; Future; Expected date: 08/19/2020  -     Sjogrens syndrome-A extractable nuclear antibody; Future; Expected date: 08/19/2020  -     Sjogrens syndrome-B extractable nuclear antibody; Future; Expected date: 08/19/2020  -     Sedimentation rate; Future; Expected date: 08/19/2020  -     C-Reactive Protein; Future; Expected date: 08/19/2020  -     TSH; Future; Expected date: 08/19/2020  -     T3, free; Future; Expected date: 08/19/2020  -     T4, free; Future; Expected date: 08/19/2020    Fibromyalgia  -     CBC auto differential; Future; Expected date: 08/19/2020  -     Comprehensive metabolic panel; Future; Expected date: 08/19/2020  -     MARTI Screen w/Reflex; Future; Expected date: 08/19/2020  -     Sjogrens syndrome-A extractable nuclear antibody; Future; Expected date: 08/19/2020  -     Sjogrens syndrome-B extractable nuclear antibody; Future; Expected date: 08/19/2020  -     Sedimentation rate; Future; Expected date: 08/19/2020  -     C-Reactive Protein; Future; Expected date: 08/19/2020  -     TSH; Future; Expected date: 08/19/2020  -     T3, free; Future; Expected date: 08/19/2020  -     T4, free; Future; Expected date: 08/19/2020    Lumbar and sacral osteoarthritis  -     CBC auto differential; Future; Expected date: 08/19/2020  -     Comprehensive metabolic panel; Future; Expected date: 08/19/2020  -     MARTI Screen w/Reflex; Future; Expected date: 08/19/2020  -     Sjogrens syndrome-A extractable nuclear antibody; Future; Expected date: 08/19/2020  -     Sjogrens syndrome-B  extractable nuclear antibody; Future; Expected date: 08/19/2020  -     Sedimentation rate; Future; Expected date: 08/19/2020  -     C-Reactive Protein; Future; Expected date: 08/19/2020  -     TSH; Future; Expected date: 08/19/2020  -     T3, free; Future; Expected date: 08/19/2020  -     T4, free; Future; Expected date: 08/19/2020    Chronic pain syndrome  -     CBC auto differential; Future; Expected date: 08/19/2020  -     Comprehensive metabolic panel; Future; Expected date: 08/19/2020  -     MARTI Screen w/Reflex; Future; Expected date: 08/19/2020  -     Sjogrens syndrome-A extractable nuclear antibody; Future; Expected date: 08/19/2020  -     Sjogrens syndrome-B extractable nuclear antibody; Future; Expected date: 08/19/2020  -     Sedimentation rate; Future; Expected date: 08/19/2020  -     C-Reactive Protein; Future; Expected date: 08/19/2020  -     TSH; Future; Expected date: 08/19/2020  -     T3, free; Future; Expected date: 08/19/2020  -     T4, free; Future; Expected date: 08/19/2020           The complexity of care for this patient was moderate to severe due to the disease state and the medications required to treat the disease.

## 2020-08-21 NOTE — TELEPHONE ENCOUNTER
X-ray consistent with degenerative facet greater than disc disease noted within lower lumbar spine. 12/2019

## 2020-12-18 DIAGNOSIS — M79.7 FIBROMYALGIA: ICD-10-CM

## 2020-12-18 DIAGNOSIS — M35.00 SJOGREN'S SYNDROME, WITH UNSPECIFIED ORGAN INVOLVEMENT: ICD-10-CM

## 2020-12-18 DIAGNOSIS — M47.817 LUMBAR AND SACRAL OSTEOARTHRITIS: ICD-10-CM

## 2020-12-18 DIAGNOSIS — M32.9 SYSTEMIC LUPUS ERYTHEMATOSUS, UNSPECIFIED SLE TYPE, UNSPECIFIED ORGAN INVOLVEMENT STATUS: ICD-10-CM

## 2020-12-18 RX ORDER — GABAPENTIN 100 MG/1
CAPSULE ORAL
Qty: 270 CAPSULE | Refills: 3 | Status: SHIPPED | OUTPATIENT
Start: 2020-12-18 | End: 2021-03-23 | Stop reason: SDUPTHER

## 2020-12-18 NOTE — TELEPHONE ENCOUNTER
Pharmacy requesting refill on Neurontin 100mg  Pt's LOV 08/19/2020  Pt's NOV 03/23/2021  Medication pending

## 2020-12-22 ENCOUNTER — OFFICE (OUTPATIENT)
Dept: URBAN - METROPOLITAN AREA TELEHEALTH 12 | Facility: TELEHEALTH | Age: 65
End: 2020-12-22
Payer: COMMERCIAL

## 2020-12-22 VITALS — HEIGHT: 65 IN | WEIGHT: 220 LBS

## 2020-12-22 DIAGNOSIS — K21.00 GASTRO-ESOPHAGEAL REFLUX DISEASE WITH ESOPHAGITIS, WITHOUT B: ICD-10-CM

## 2020-12-22 DIAGNOSIS — R11.0 NAUSEA: ICD-10-CM

## 2020-12-22 DIAGNOSIS — R10.13 EPIGASTRIC PAIN: ICD-10-CM

## 2020-12-22 DIAGNOSIS — K22.70 BARRETT'S ESOPHAGUS WITHOUT DYSPLASIA: ICD-10-CM

## 2020-12-22 PROCEDURE — 99442: CPT | Performed by: PHYSICIAN ASSISTANT

## 2020-12-22 NOTE — SERVICEHPINOTES
PATIENT VERIFIED BY DATE OF BIRTH AND NAME. Patient has been consented for this telecommunication visit.   Ms. Nelson is here for f/u. She underwent an EGD 09/19 which showed grade A esophagitis and Gilbert's esophagus. EGD in 2016 showed esophagitis. She has been having a tough time controlling her asthma she has a lot of phlegm in her throat as her main symptom but no issues actually breathing. She takes Dexilant and Pepcid 40 mg daily. She continues to have pain at the epigastrium. This pain is worse w/stress since the age of 18. Swallowing well and a normal appetite. Has nausea but she thinks this is due to post nasal drip. No early satiety or heartburn. Some weight loss but has been trying to lose it (joined [employer]). Takes Celebrex once per day down from BID. Decreasing the Celebrex didn't help the stomach pain. No known heart issues.   ROS as per HPI and otherwise is negative.

## 2020-12-24 DIAGNOSIS — M79.7 FIBROMYALGIA: ICD-10-CM

## 2020-12-24 DIAGNOSIS — I10 ESSENTIAL HYPERTENSION: ICD-10-CM

## 2020-12-24 NOTE — TELEPHONE ENCOUNTER
Pharmacy requesting refill on Lexapro and Metoprolol  Pt's LOV 08/19/2020  Pt's NOV 03/23/2021  Medication pending

## 2020-12-27 RX ORDER — METOPROLOL TARTRATE 100 MG/1
100 TABLET ORAL 2 TIMES DAILY
Qty: 180 TABLET | Refills: 1 | Status: SHIPPED | OUTPATIENT
Start: 2020-12-27 | End: 2021-03-17 | Stop reason: SDUPTHER

## 2020-12-27 RX ORDER — ESCITALOPRAM OXALATE 20 MG/1
20 TABLET ORAL DAILY
Qty: 90 TABLET | Refills: 1 | Status: SHIPPED | OUTPATIENT
Start: 2020-12-27 | End: 2021-03-17 | Stop reason: SDUPTHER

## 2021-01-15 ENCOUNTER — OFFICE (OUTPATIENT)
Dept: URBAN - METROPOLITAN AREA CLINIC 34 | Facility: CLINIC | Age: 66
End: 2021-01-15
Payer: COMMERCIAL

## 2021-01-15 DIAGNOSIS — Z11.59 ENCOUNTER FOR SCREENING FOR OTHER VIRAL DISEASES: ICD-10-CM

## 2021-01-15 PROCEDURE — 99211 OFF/OP EST MAY X REQ PHY/QHP: CPT | Mod: 25,CS | Performed by: INTERNAL MEDICINE

## 2021-01-19 ENCOUNTER — OFFICE (OUTPATIENT)
Dept: URBAN - METROPOLITAN AREA CLINIC 98 | Facility: CLINIC | Age: 66
End: 2021-01-19
Payer: COMMERCIAL

## 2021-01-19 ENCOUNTER — OFFICE (OUTPATIENT)
Dept: URBAN - METROPOLITAN AREA PATHOLOGY 18 | Facility: PATHOLOGY | Age: 66
End: 2021-01-19
Payer: COMMERCIAL

## 2021-01-19 VITALS
SYSTOLIC BLOOD PRESSURE: 125 MMHG | OXYGEN SATURATION: 98 % | TEMPERATURE: 97.7 F | HEART RATE: 67 BPM | SYSTOLIC BLOOD PRESSURE: 135 MMHG | SYSTOLIC BLOOD PRESSURE: 125 MMHG | TEMPERATURE: 97.5 F | SYSTOLIC BLOOD PRESSURE: 132 MMHG | HEART RATE: 61 BPM | DIASTOLIC BLOOD PRESSURE: 84 MMHG | DIASTOLIC BLOOD PRESSURE: 84 MMHG | HEART RATE: 59 BPM | HEART RATE: 72 BPM | OXYGEN SATURATION: 93 % | HEART RATE: 61 BPM | HEART RATE: 89 BPM | DIASTOLIC BLOOD PRESSURE: 77 MMHG | RESPIRATION RATE: 14 BRPM | SYSTOLIC BLOOD PRESSURE: 135 MMHG | RESPIRATION RATE: 16 BRPM | OXYGEN SATURATION: 91 % | DIASTOLIC BLOOD PRESSURE: 82 MMHG | SYSTOLIC BLOOD PRESSURE: 147 MMHG | WEIGHT: 220 LBS | SYSTOLIC BLOOD PRESSURE: 132 MMHG | SYSTOLIC BLOOD PRESSURE: 129 MMHG | OXYGEN SATURATION: 93 % | RESPIRATION RATE: 17 BRPM | SYSTOLIC BLOOD PRESSURE: 147 MMHG | HEART RATE: 59 BPM | DIASTOLIC BLOOD PRESSURE: 77 MMHG | HEART RATE: 89 BPM | TEMPERATURE: 97.5 F | DIASTOLIC BLOOD PRESSURE: 79 MMHG | OXYGEN SATURATION: 98 % | RESPIRATION RATE: 16 BRPM | TEMPERATURE: 97.9 F | RESPIRATION RATE: 14 BRPM | WEIGHT: 220 LBS | HEART RATE: 72 BPM | TEMPERATURE: 97.7 F | SYSTOLIC BLOOD PRESSURE: 129 MMHG | HEIGHT: 65 IN | DIASTOLIC BLOOD PRESSURE: 82 MMHG | DIASTOLIC BLOOD PRESSURE: 79 MMHG | HEART RATE: 60 BPM | OXYGEN SATURATION: 91 % | OXYGEN SATURATION: 94 % | HEART RATE: 67 BPM | HEIGHT: 65 IN | TEMPERATURE: 97.9 F | OXYGEN SATURATION: 94 % | HEART RATE: 60 BPM | RESPIRATION RATE: 17 BRPM

## 2021-01-19 DIAGNOSIS — K21.9 GASTRO-ESOPHAGEAL REFLUX DISEASE WITHOUT ESOPHAGITIS: ICD-10-CM

## 2021-01-19 DIAGNOSIS — K31.89 OTHER DISEASES OF STOMACH AND DUODENUM: ICD-10-CM

## 2021-01-19 DIAGNOSIS — K22.8 OTHER SPECIFIED DISEASES OF ESOPHAGUS: ICD-10-CM

## 2021-01-19 DIAGNOSIS — K22.70 BARRETT'S ESOPHAGUS WITHOUT DYSPLASIA: ICD-10-CM

## 2021-01-19 PROCEDURE — 88312 SPECIAL STAINS GROUP 1: CPT | Performed by: PATHOLOGY

## 2021-01-19 PROCEDURE — 88313 SPECIAL STAINS GROUP 2: CPT | Performed by: PATHOLOGY

## 2021-01-19 PROCEDURE — 00731 ANES UPR GI NDSC PX NOS: CPT | Mod: AA,QS,P2

## 2021-01-19 PROCEDURE — 00731 ANES UPR GI NDSC PX NOS: CPT | Mod: AA,P2,QS

## 2021-01-19 PROCEDURE — 88305 TISSUE EXAM BY PATHOLOGIST: CPT | Performed by: PATHOLOGY

## 2021-01-19 PROCEDURE — 88342 IMHCHEM/IMCYTCHM 1ST ANTB: CPT | Performed by: PATHOLOGY

## 2021-02-19 ENCOUNTER — TELEPHONE (OUTPATIENT)
Dept: RHEUMATOLOGY | Facility: CLINIC | Age: 66
End: 2021-02-19

## 2021-03-17 DIAGNOSIS — M79.7 FIBROMYALGIA: ICD-10-CM

## 2021-03-17 DIAGNOSIS — I10 ESSENTIAL HYPERTENSION: ICD-10-CM

## 2021-03-17 DIAGNOSIS — M35.00 SJOGREN'S SYNDROME, WITH UNSPECIFIED ORGAN INVOLVEMENT: ICD-10-CM

## 2021-03-17 RX ORDER — FLUCONAZOLE 150 MG/1
150 TABLET ORAL DAILY
Qty: 5 TABLET | Refills: 2 | Status: SHIPPED | OUTPATIENT
Start: 2021-03-17 | End: 2021-09-07 | Stop reason: SDUPTHER

## 2021-03-17 RX ORDER — METOPROLOL TARTRATE 100 MG/1
100 TABLET ORAL 2 TIMES DAILY
Qty: 180 TABLET | Refills: 1 | Status: SHIPPED | OUTPATIENT
Start: 2021-03-17 | End: 2021-09-09 | Stop reason: SDUPTHER

## 2021-03-17 RX ORDER — ESCITALOPRAM OXALATE 20 MG/1
20 TABLET ORAL DAILY
Qty: 90 TABLET | Refills: 1 | Status: SHIPPED | OUTPATIENT
Start: 2021-03-17 | End: 2021-03-23 | Stop reason: SDUPTHER

## 2021-03-23 ENCOUNTER — OFFICE VISIT (OUTPATIENT)
Dept: RHEUMATOLOGY | Facility: CLINIC | Age: 66
End: 2021-03-23
Payer: MEDICARE

## 2021-03-23 VITALS
HEIGHT: 65 IN | SYSTOLIC BLOOD PRESSURE: 140 MMHG | RESPIRATION RATE: 12 BRPM | BODY MASS INDEX: 33.66 KG/M2 | DIASTOLIC BLOOD PRESSURE: 89 MMHG | WEIGHT: 202 LBS | HEART RATE: 64 BPM

## 2021-03-23 DIAGNOSIS — M47.817 LUMBAR AND SACRAL OSTEOARTHRITIS: ICD-10-CM

## 2021-03-23 DIAGNOSIS — M35.1 MIXED CONNECTIVE TISSUE DISEASE: ICD-10-CM

## 2021-03-23 DIAGNOSIS — M35.00 SJOGREN'S SYNDROME, WITH UNSPECIFIED ORGAN INVOLVEMENT: ICD-10-CM

## 2021-03-23 DIAGNOSIS — R09.81 SINUS CONGESTION: ICD-10-CM

## 2021-03-23 DIAGNOSIS — M79.7 FIBROMYALGIA: ICD-10-CM

## 2021-03-23 DIAGNOSIS — B37.9 CANDIDA ALBICANS INFECTION: ICD-10-CM

## 2021-03-23 DIAGNOSIS — M32.9 SYSTEMIC LUPUS ERYTHEMATOSUS, UNSPECIFIED SLE TYPE, UNSPECIFIED ORGAN INVOLVEMENT STATUS: ICD-10-CM

## 2021-03-23 DIAGNOSIS — R42 DIZZINESS: ICD-10-CM

## 2021-03-23 DIAGNOSIS — G89.4 CHRONIC PAIN SYNDROME: ICD-10-CM

## 2021-03-23 PROCEDURE — 99999 PR PBB SHADOW E&M-EST. PATIENT-LVL III: CPT | Mod: PBBFAC,,, | Performed by: INTERNAL MEDICINE

## 2021-03-23 PROCEDURE — 99213 OFFICE O/P EST LOW 20 MIN: CPT | Mod: PBBFAC,PO | Performed by: INTERNAL MEDICINE

## 2021-03-23 PROCEDURE — 99999 PR PBB SHADOW E&M-EST. PATIENT-LVL III: ICD-10-PCS | Mod: PBBFAC,,, | Performed by: INTERNAL MEDICINE

## 2021-03-23 PROCEDURE — 99214 PR OFFICE/OUTPT VISIT, EST, LEVL IV, 30-39 MIN: ICD-10-PCS | Mod: S$PBB,,, | Performed by: INTERNAL MEDICINE

## 2021-03-23 PROCEDURE — 99214 OFFICE O/P EST MOD 30 MIN: CPT | Mod: S$PBB,,, | Performed by: INTERNAL MEDICINE

## 2021-03-23 RX ORDER — HYDROXYCHLOROQUINE SULFATE 200 MG/1
200 TABLET, FILM COATED ORAL 2 TIMES DAILY
Qty: 180 TABLET | Refills: 3 | Status: SHIPPED | OUTPATIENT
Start: 2021-03-23 | End: 2022-02-01 | Stop reason: SDUPTHER

## 2021-03-23 RX ORDER — HYDROCHLOROTHIAZIDE 12.5 MG/1
CAPSULE ORAL
Qty: 30 CAPSULE | Refills: 3 | Status: SHIPPED | OUTPATIENT
Start: 2021-03-23 | End: 2021-03-23 | Stop reason: SDUPTHER

## 2021-03-23 RX ORDER — ESCITALOPRAM OXALATE 20 MG/1
20 TABLET ORAL DAILY
Qty: 90 TABLET | Refills: 1 | Status: SHIPPED | OUTPATIENT
Start: 2021-03-23 | End: 2021-09-09 | Stop reason: SDUPTHER

## 2021-03-23 RX ORDER — GABAPENTIN 100 MG/1
CAPSULE ORAL
Qty: 270 CAPSULE | Refills: 3 | Status: SHIPPED | OUTPATIENT
Start: 2021-03-23 | End: 2021-11-30 | Stop reason: SDUPTHER

## 2021-03-23 RX ORDER — METHYLPREDNISOLONE ACETATE 80 MG/ML
160 INJECTION, SUSPENSION INTRA-ARTICULAR; INTRALESIONAL; INTRAMUSCULAR; SOFT TISSUE
Qty: 6 ML | Refills: 3 | Status: SHIPPED | OUTPATIENT
Start: 2021-03-23 | End: 2021-11-30 | Stop reason: SDUPTHER

## 2021-03-23 RX ORDER — HYDROCHLOROTHIAZIDE 12.5 MG/1
CAPSULE ORAL
Qty: 90 CAPSULE | Refills: 3 | Status: SHIPPED | OUTPATIENT
Start: 2021-03-23 | End: 2021-05-26

## 2021-03-23 RX ORDER — CYANOCOBALAMIN 1000 UG/ML
1000 INJECTION, SOLUTION INTRAMUSCULAR; SUBCUTANEOUS
Qty: 30 ML | Refills: 3 | Status: SHIPPED | OUTPATIENT
Start: 2021-03-23 | End: 2021-03-23 | Stop reason: SDUPTHER

## 2021-03-23 RX ORDER — POTASSIUM CHLORIDE 750 MG/1
10 TABLET, EXTENDED RELEASE ORAL DAILY
Qty: 90 TABLET | Refills: 3 | Status: SHIPPED | OUTPATIENT
Start: 2021-03-23 | End: 2022-04-30

## 2021-03-23 RX ORDER — KETOROLAC TROMETHAMINE 30 MG/ML
INJECTION, SOLUTION INTRAMUSCULAR; INTRAVENOUS
Qty: 2 ML | Refills: 6 | Status: SHIPPED | OUTPATIENT
Start: 2021-03-23 | End: 2021-09-09 | Stop reason: SDUPTHER

## 2021-03-23 RX ORDER — TRAZODONE HYDROCHLORIDE 50 MG/1
50 TABLET ORAL NIGHTLY
Qty: 30 TABLET | Refills: 11 | Status: SHIPPED | OUTPATIENT
Start: 2021-03-23 | End: 2021-09-09

## 2021-03-23 RX ORDER — CYANOCOBALAMIN 1000 UG/ML
1000 INJECTION, SOLUTION INTRAMUSCULAR; SUBCUTANEOUS
Qty: 30 ML | Refills: 3 | Status: SHIPPED | OUTPATIENT
Start: 2021-03-23 | End: 2021-11-30 | Stop reason: SDUPTHER

## 2021-03-23 ASSESSMENT — ROUTINE ASSESSMENT OF PATIENT INDEX DATA (RAPID3)
PSYCHOLOGICAL DISTRESS SCORE: 4.4
MDHAQ FUNCTION SCORE: 1.3
PAIN SCORE: 8.5
PATIENT GLOBAL ASSESSMENT SCORE: 6
FATIGUE SCORE: 3.3
TOTAL RAPID3 SCORE: 6.28

## 2021-04-07 ENCOUNTER — TELEPHONE (OUTPATIENT)
Dept: RHEUMATOLOGY | Facility: CLINIC | Age: 66
End: 2021-04-07

## 2021-04-07 DIAGNOSIS — B37.9 CANDIDA ALBICANS INFECTION: ICD-10-CM

## 2021-04-07 DIAGNOSIS — M35.00 SJOGREN'S SYNDROME, WITH UNSPECIFIED ORGAN INVOLVEMENT: ICD-10-CM

## 2021-04-07 DIAGNOSIS — M79.7 FIBROMYALGIA: ICD-10-CM

## 2021-04-07 DIAGNOSIS — G89.4 CHRONIC PAIN SYNDROME: ICD-10-CM

## 2021-04-07 DIAGNOSIS — M32.9 SYSTEMIC LUPUS ERYTHEMATOSUS, UNSPECIFIED SLE TYPE, UNSPECIFIED ORGAN INVOLVEMENT STATUS: ICD-10-CM

## 2021-04-07 DIAGNOSIS — M35.1 MIXED CONNECTIVE TISSUE DISEASE: ICD-10-CM

## 2021-04-07 DIAGNOSIS — R42 DIZZINESS: ICD-10-CM

## 2021-04-07 DIAGNOSIS — R09.81 SINUS CONGESTION: ICD-10-CM

## 2021-04-07 DIAGNOSIS — M47.817 LUMBAR AND SACRAL OSTEOARTHRITIS: ICD-10-CM

## 2021-04-07 RX ORDER — HYDROCHLOROTHIAZIDE 25 MG/1
25 TABLET ORAL DAILY
Qty: 90 TABLET | Refills: 3 | Status: SHIPPED | OUTPATIENT
Start: 2021-04-07 | End: 2021-05-26 | Stop reason: SDUPTHER

## 2021-04-07 RX ORDER — TRAZODONE HYDROCHLORIDE 50 MG/1
50 TABLET ORAL NIGHTLY
Qty: 30 TABLET | Refills: 11 | Status: CANCELLED | OUTPATIENT
Start: 2021-04-07 | End: 2022-04-07

## 2021-05-12 ENCOUNTER — PATIENT MESSAGE (OUTPATIENT)
Dept: RESEARCH | Facility: HOSPITAL | Age: 66
End: 2021-05-12

## 2021-05-25 DIAGNOSIS — M47.817 LUMBAR AND SACRAL OSTEOARTHRITIS: ICD-10-CM

## 2021-05-25 DIAGNOSIS — M79.7 FIBROMYALGIA: ICD-10-CM

## 2021-05-25 DIAGNOSIS — R09.81 SINUS CONGESTION: ICD-10-CM

## 2021-05-26 RX ORDER — VALSARTAN 320 MG/1
320 TABLET ORAL DAILY
Qty: 90 TABLET | Refills: 3 | Status: SHIPPED | OUTPATIENT
Start: 2021-05-26 | End: 2021-11-30 | Stop reason: SDUPTHER

## 2021-05-27 RX ORDER — HYDROCHLOROTHIAZIDE 25 MG/1
25 TABLET ORAL DAILY
Qty: 90 TABLET | Refills: 3 | Status: SHIPPED | OUTPATIENT
Start: 2021-05-27 | End: 2022-02-01 | Stop reason: SDUPTHER

## 2021-09-07 DIAGNOSIS — M35.00 SJOGREN'S SYNDROME, WITH UNSPECIFIED ORGAN INVOLVEMENT: ICD-10-CM

## 2021-09-07 RX ORDER — FLUCONAZOLE 150 MG/1
150 TABLET ORAL DAILY
Qty: 5 TABLET | Refills: 2 | Status: SHIPPED | OUTPATIENT
Start: 2021-09-07 | End: 2021-09-09 | Stop reason: SDUPTHER

## 2021-09-09 ENCOUNTER — OFFICE VISIT (OUTPATIENT)
Dept: RHEUMATOLOGY | Facility: CLINIC | Age: 66
End: 2021-09-09
Payer: MEDICARE

## 2021-09-09 VITALS
HEART RATE: 66 BPM | HEIGHT: 65 IN | BODY MASS INDEX: 33.65 KG/M2 | SYSTOLIC BLOOD PRESSURE: 125 MMHG | DIASTOLIC BLOOD PRESSURE: 78 MMHG | WEIGHT: 201.94 LBS

## 2021-09-09 DIAGNOSIS — M79.7 FIBROMYALGIA: ICD-10-CM

## 2021-09-09 DIAGNOSIS — M19.90 OSTEOARTHRITIS, UNSPECIFIED OSTEOARTHRITIS TYPE, UNSPECIFIED SITE: ICD-10-CM

## 2021-09-09 DIAGNOSIS — I10 ESSENTIAL HYPERTENSION: ICD-10-CM

## 2021-09-09 DIAGNOSIS — M35.1 MIXED CONNECTIVE TISSUE DISEASE: ICD-10-CM

## 2021-09-09 DIAGNOSIS — M47.817 LUMBAR AND SACRAL OSTEOARTHRITIS: ICD-10-CM

## 2021-09-09 DIAGNOSIS — M32.9 SYSTEMIC LUPUS ERYTHEMATOSUS, UNSPECIFIED SLE TYPE, UNSPECIFIED ORGAN INVOLVEMENT STATUS: Primary | ICD-10-CM

## 2021-09-09 DIAGNOSIS — M35.00 SJOGREN'S SYNDROME, WITH UNSPECIFIED ORGAN INVOLVEMENT: ICD-10-CM

## 2021-09-09 DIAGNOSIS — M32.9 SYSTEMIC LUPUS ERYTHEMATOSUS, UNSPECIFIED SLE TYPE, UNSPECIFIED ORGAN INVOLVEMENT STATUS: ICD-10-CM

## 2021-09-09 DIAGNOSIS — Z79.899 HIGH RISK MEDICATION USE: ICD-10-CM

## 2021-09-09 DIAGNOSIS — R42 DIZZINESS: ICD-10-CM

## 2021-09-09 DIAGNOSIS — G89.4 CHRONIC PAIN SYNDROME: ICD-10-CM

## 2021-09-09 DIAGNOSIS — R09.81 SINUS CONGESTION: ICD-10-CM

## 2021-09-09 PROCEDURE — 99999 PR PBB SHADOW E&M-EST. PATIENT-LVL V: CPT | Mod: PBBFAC,,, | Performed by: PHYSICIAN ASSISTANT

## 2021-09-09 PROCEDURE — 99214 PR OFFICE/OUTPT VISIT, EST, LEVL IV, 30-39 MIN: ICD-10-PCS | Mod: S$PBB,,, | Performed by: PHYSICIAN ASSISTANT

## 2021-09-09 PROCEDURE — 99215 OFFICE O/P EST HI 40 MIN: CPT | Mod: PBBFAC,PN | Performed by: PHYSICIAN ASSISTANT

## 2021-09-09 PROCEDURE — 99214 OFFICE O/P EST MOD 30 MIN: CPT | Mod: S$PBB,,, | Performed by: PHYSICIAN ASSISTANT

## 2021-09-09 PROCEDURE — 99999 PR PBB SHADOW E&M-EST. PATIENT-LVL V: ICD-10-PCS | Mod: PBBFAC,,, | Performed by: PHYSICIAN ASSISTANT

## 2021-09-09 RX ORDER — HYDROCODONE BITARTRATE AND ACETAMINOPHEN 5; 325 MG/1; MG/1
1-2 TABLET ORAL
COMMUNITY
Start: 2021-07-06 | End: 2022-06-16

## 2021-09-09 RX ORDER — FLUCONAZOLE 150 MG/1
150 TABLET ORAL DAILY
Qty: 5 TABLET | Refills: 2 | Status: CANCELLED | OUTPATIENT
Start: 2021-09-09

## 2021-09-09 RX ORDER — DICLOFENAC SODIUM 10 MG/G
2 GEL TOPICAL 4 TIMES DAILY
Qty: 100 G | Refills: 0 | Status: SHIPPED | OUTPATIENT
Start: 2021-09-09

## 2021-09-09 RX ORDER — METOPROLOL TARTRATE 100 MG/1
100 TABLET ORAL 2 TIMES DAILY
Qty: 180 TABLET | Refills: 1 | Status: CANCELLED | OUTPATIENT
Start: 2021-09-09

## 2021-09-09 RX ORDER — ESCITALOPRAM OXALATE 20 MG/1
20 TABLET ORAL DAILY
Qty: 90 TABLET | Refills: 1 | Status: CANCELLED | OUTPATIENT
Start: 2021-09-09

## 2021-09-09 RX ORDER — KETOROLAC TROMETHAMINE 30 MG/ML
INJECTION, SOLUTION INTRAMUSCULAR; INTRAVENOUS
Qty: 2 ML | Refills: 6 | Status: SHIPPED | OUTPATIENT
Start: 2021-09-09 | End: 2022-06-16 | Stop reason: SDUPTHER

## 2021-09-09 RX ORDER — ESTRADIOL 1 MG/1
1 TABLET ORAL DAILY
COMMUNITY
Start: 2021-07-16 | End: 2023-05-30 | Stop reason: SDUPTHER

## 2021-09-09 RX ORDER — FLUCONAZOLE 150 MG/1
150 TABLET ORAL DAILY
Qty: 5 TABLET | Refills: 2 | Status: SHIPPED | OUTPATIENT
Start: 2021-09-09 | End: 2022-12-19 | Stop reason: SDUPTHER

## 2021-09-09 RX ORDER — METOPROLOL TARTRATE 100 MG/1
100 TABLET ORAL 2 TIMES DAILY
Qty: 180 TABLET | Refills: 1 | Status: SHIPPED | OUTPATIENT
Start: 2021-09-09 | End: 2021-11-30 | Stop reason: SDUPTHER

## 2021-09-09 RX ORDER — ESCITALOPRAM OXALATE 20 MG/1
20 TABLET ORAL DAILY
Qty: 90 TABLET | Refills: 1 | Status: SHIPPED | OUTPATIENT
Start: 2021-09-09 | End: 2021-11-30 | Stop reason: SDUPTHER

## 2021-09-09 RX ORDER — TIZANIDINE 4 MG/1
4 TABLET ORAL NIGHTLY PRN
Qty: 30 TABLET | Refills: 3 | Status: SHIPPED | OUTPATIENT
Start: 2021-09-09 | End: 2021-09-19

## 2021-11-01 ENCOUNTER — TELEPHONE (OUTPATIENT)
Dept: RHEUMATOLOGY | Facility: CLINIC | Age: 66
End: 2021-11-01
Payer: MEDICARE

## 2021-11-01 DIAGNOSIS — G89.4 CHRONIC PAIN SYNDROME: ICD-10-CM

## 2021-11-01 DIAGNOSIS — M47.817 LUMBAR AND SACRAL OSTEOARTHRITIS: ICD-10-CM

## 2021-11-01 DIAGNOSIS — M32.9 SYSTEMIC LUPUS ERYTHEMATOSUS, UNSPECIFIED SLE TYPE, UNSPECIFIED ORGAN INVOLVEMENT STATUS: Primary | ICD-10-CM

## 2021-11-01 DIAGNOSIS — M35.1 MIXED CONNECTIVE TISSUE DISEASE: ICD-10-CM

## 2021-11-01 DIAGNOSIS — M79.7 FIBROMYALGIA: ICD-10-CM

## 2021-11-30 ENCOUNTER — OFFICE VISIT (OUTPATIENT)
Dept: RHEUMATOLOGY | Facility: CLINIC | Age: 66
End: 2021-11-30
Payer: MEDICARE

## 2021-11-30 VITALS
WEIGHT: 209 LBS | HEIGHT: 65 IN | SYSTOLIC BLOOD PRESSURE: 167 MMHG | DIASTOLIC BLOOD PRESSURE: 72 MMHG | BODY MASS INDEX: 34.82 KG/M2 | HEART RATE: 73 BPM

## 2021-11-30 DIAGNOSIS — M35.1 MIXED CONNECTIVE TISSUE DISEASE: ICD-10-CM

## 2021-11-30 DIAGNOSIS — I10 ESSENTIAL HYPERTENSION: ICD-10-CM

## 2021-11-30 DIAGNOSIS — M47.817 LUMBAR AND SACRAL OSTEOARTHRITIS: ICD-10-CM

## 2021-11-30 DIAGNOSIS — M35.00 SJOGREN'S SYNDROME, WITH UNSPECIFIED ORGAN INVOLVEMENT: ICD-10-CM

## 2021-11-30 DIAGNOSIS — G89.4 CHRONIC PAIN SYNDROME: ICD-10-CM

## 2021-11-30 DIAGNOSIS — M79.7 FIBROMYALGIA: ICD-10-CM

## 2021-11-30 DIAGNOSIS — R09.81 SINUS CONGESTION: ICD-10-CM

## 2021-11-30 DIAGNOSIS — B37.9 CANDIDA ALBICANS INFECTION: ICD-10-CM

## 2021-11-30 DIAGNOSIS — M32.9 SYSTEMIC LUPUS ERYTHEMATOSUS, UNSPECIFIED SLE TYPE, UNSPECIFIED ORGAN INVOLVEMENT STATUS: ICD-10-CM

## 2021-11-30 DIAGNOSIS — R42 DIZZINESS: ICD-10-CM

## 2021-11-30 PROCEDURE — 99215 PR OFFICE/OUTPT VISIT, EST, LEVL V, 40-54 MIN: ICD-10-PCS | Mod: S$GLB,,, | Performed by: INTERNAL MEDICINE

## 2021-11-30 PROCEDURE — 99999 PR PBB SHADOW E&M-EST. PATIENT-LVL III: ICD-10-PCS | Mod: PBBFAC,,, | Performed by: INTERNAL MEDICINE

## 2021-11-30 PROCEDURE — 99999 PR PBB SHADOW E&M-EST. PATIENT-LVL III: CPT | Mod: PBBFAC,,, | Performed by: INTERNAL MEDICINE

## 2021-11-30 PROCEDURE — 99213 OFFICE O/P EST LOW 20 MIN: CPT | Mod: PBBFAC,PN | Performed by: INTERNAL MEDICINE

## 2021-11-30 PROCEDURE — 99215 OFFICE O/P EST HI 40 MIN: CPT | Mod: S$GLB,,, | Performed by: INTERNAL MEDICINE

## 2021-11-30 RX ORDER — KETOROLAC TROMETHAMINE 30 MG/ML
30 INJECTION, SOLUTION INTRAMUSCULAR; INTRAVENOUS
Qty: 4 ML | Refills: 6 | Status: SHIPPED | OUTPATIENT
Start: 2021-11-30 | End: 2022-05-29

## 2021-11-30 RX ORDER — NEEDLES, DISPOSABLE 25GX5/8"
1 NEEDLE, DISPOSABLE MISCELLANEOUS WEEKLY
Qty: 25 EACH | Refills: 3 | Status: SHIPPED | OUTPATIENT
Start: 2021-11-30

## 2021-11-30 RX ORDER — METOPROLOL TARTRATE 100 MG/1
100 TABLET ORAL 2 TIMES DAILY
Qty: 180 TABLET | Refills: 3 | Status: SHIPPED | OUTPATIENT
Start: 2021-11-30 | End: 2022-12-19 | Stop reason: SDUPTHER

## 2021-11-30 RX ORDER — GABAPENTIN 100 MG/1
CAPSULE ORAL
Qty: 270 CAPSULE | Refills: 3 | Status: SHIPPED | OUTPATIENT
Start: 2021-11-30 | End: 2022-12-19 | Stop reason: SDUPTHER

## 2021-11-30 RX ORDER — VALSARTAN 320 MG/1
320 TABLET ORAL DAILY
Qty: 90 TABLET | Refills: 3 | Status: SHIPPED | OUTPATIENT
Start: 2021-11-30 | End: 2022-12-19 | Stop reason: SDUPTHER

## 2021-11-30 RX ORDER — ESCITALOPRAM OXALATE 20 MG/1
20 TABLET ORAL DAILY
Qty: 90 TABLET | Refills: 3 | Status: SHIPPED | OUTPATIENT
Start: 2021-11-30 | End: 2022-06-16 | Stop reason: SDUPTHER

## 2021-11-30 RX ORDER — SYRINGE REUSABLE, 3 ML
1 SYRINGE, REUSABLE MISCELLANEOUS DAILY
Qty: 100 EACH | Refills: 3 | Status: SHIPPED | OUTPATIENT
Start: 2021-11-30

## 2021-11-30 RX ORDER — TIZANIDINE 4 MG/1
4 TABLET ORAL NIGHTLY
COMMUNITY
Start: 2021-11-03 | End: 2021-11-30

## 2021-11-30 RX ORDER — METHYLPREDNISOLONE ACETATE 80 MG/ML
160 INJECTION, SUSPENSION INTRA-ARTICULAR; INTRALESIONAL; INTRAMUSCULAR; SOFT TISSUE
Qty: 6 ML | Refills: 3 | Status: SHIPPED | OUTPATIENT
Start: 2021-11-30 | End: 2022-05-16

## 2021-11-30 RX ORDER — CYANOCOBALAMIN 1000 UG/ML
1000 INJECTION, SOLUTION INTRAMUSCULAR; SUBCUTANEOUS
Qty: 30 ML | Refills: 3 | Status: SHIPPED | OUTPATIENT
Start: 2021-11-30

## 2021-11-30 ASSESSMENT — ROUTINE ASSESSMENT OF PATIENT INDEX DATA (RAPID3)
FATIGUE SCORE: 1.1
PAIN SCORE: 6.5
TOTAL RAPID3 SCORE: 5.78
MDHAQ FUNCTION SCORE: 1.3
PSYCHOLOGICAL DISTRESS SCORE: 1.1
PATIENT GLOBAL ASSESSMENT SCORE: 6.5

## 2022-02-01 DIAGNOSIS — M35.00 SJOGREN'S SYNDROME, WITH UNSPECIFIED ORGAN INVOLVEMENT: ICD-10-CM

## 2022-02-01 DIAGNOSIS — R42 DIZZINESS: ICD-10-CM

## 2022-02-01 DIAGNOSIS — M79.7 FIBROMYALGIA: ICD-10-CM

## 2022-02-01 DIAGNOSIS — M47.817 LUMBAR AND SACRAL OSTEOARTHRITIS: ICD-10-CM

## 2022-02-01 DIAGNOSIS — R09.81 SINUS CONGESTION: ICD-10-CM

## 2022-02-01 DIAGNOSIS — G89.4 CHRONIC PAIN SYNDROME: ICD-10-CM

## 2022-02-01 DIAGNOSIS — M32.9 SYSTEMIC LUPUS ERYTHEMATOSUS, UNSPECIFIED SLE TYPE, UNSPECIFIED ORGAN INVOLVEMENT STATUS: ICD-10-CM

## 2022-02-01 DIAGNOSIS — M35.1 MIXED CONNECTIVE TISSUE DISEASE: ICD-10-CM

## 2022-02-01 NOTE — TELEPHONE ENCOUNTER
----- Message from Rene Rosa MA sent at 2/1/2022 11:58 AM CST -----  Contact: NADER REID [[id number]]  Type: Needs Medical Advice    Who Called: NADER REID [[id number]]  Best Call Back Number: [phone]  Inquiry/Question: Would you kindly call NADER REID [[id number]] regarding medication concerns with pharmacy .  Thank you~

## 2022-02-01 NOTE — TELEPHONE ENCOUNTER
----- Message from Rene Rosa MA sent at 2/1/2022 11:58 AM CST -----  Contact: NADER REID [[id number]]  Type: Needs Medical Advice    Who Called: NADER REID [[id number]]  Best Call Back Number: [phone]  Inquiry/Question: Would you kindly call NADER REID [[id number]] regarding medication concerns with pharmacy .  Thank you~

## 2022-02-01 NOTE — TELEPHONE ENCOUNTER
----- Message from Rene Rosa MA sent at 2/1/2022 11:58 AM CST -----  Contact: NADER REID [[id number]]  Type: Needs Medical Advice    Who Called: NADER REID [[id number]]  Best Call Back Number: [phone]  Inquiry/Question: Would you kindly call NADER REID [[id number]] regarding medication concerns with pharmacy .  Thank you~

## 2022-02-07 RX ORDER — HYDROCHLOROTHIAZIDE 25 MG/1
25 TABLET ORAL DAILY
Qty: 90 TABLET | Refills: 3 | Status: SHIPPED | OUTPATIENT
Start: 2022-02-07 | End: 2022-12-19 | Stop reason: SDUPTHER

## 2022-02-07 RX ORDER — HYDROXYCHLOROQUINE SULFATE 200 MG/1
200 TABLET, FILM COATED ORAL 2 TIMES DAILY
Qty: 180 TABLET | Refills: 3 | Status: SHIPPED | OUTPATIENT
Start: 2022-02-07 | End: 2022-12-19 | Stop reason: SDUPTHER

## 2022-02-11 ENCOUNTER — TELEPHONE (OUTPATIENT)
Dept: RHEUMATOLOGY | Facility: CLINIC | Age: 67
End: 2022-02-11
Payer: MEDICARE

## 2022-02-11 NOTE — TELEPHONE ENCOUNTER
----- Message from Francis Merlos sent at 2/9/2022 11:53 AM CST -----  Regarding: Call back  Who Called: pt          What is the reason for the call: calling in regards to needing nurse to give call back about possibly switching prescription for depo steroid shot. States pharmacies are telling her they are unable to get it anymore.Please contact to further assist.          Can patient be contacted on [employer]: No          Call back number: [phone]

## 2022-03-10 ENCOUNTER — PATIENT MESSAGE (OUTPATIENT)
Dept: RHEUMATOLOGY | Facility: CLINIC | Age: 67
End: 2022-03-10
Payer: MEDICARE

## 2022-05-11 DIAGNOSIS — R09.81 SINUS CONGESTION: ICD-10-CM

## 2022-05-11 DIAGNOSIS — R42 DIZZINESS: ICD-10-CM

## 2022-05-11 DIAGNOSIS — B37.9 CANDIDA ALBICANS INFECTION: ICD-10-CM

## 2022-05-11 DIAGNOSIS — M35.00 SJOGREN'S SYNDROME, WITH UNSPECIFIED ORGAN INVOLVEMENT: ICD-10-CM

## 2022-05-11 DIAGNOSIS — M79.7 FIBROMYALGIA: ICD-10-CM

## 2022-05-11 DIAGNOSIS — M32.9 SYSTEMIC LUPUS ERYTHEMATOSUS, UNSPECIFIED SLE TYPE, UNSPECIFIED ORGAN INVOLVEMENT STATUS: ICD-10-CM

## 2022-05-11 DIAGNOSIS — M47.817 LUMBAR AND SACRAL OSTEOARTHRITIS: ICD-10-CM

## 2022-05-16 RX ORDER — METHYLPREDNISOLONE ACETATE 80 MG/ML
160 INJECTION, SUSPENSION INTRA-ARTICULAR; INTRALESIONAL; INTRAMUSCULAR; SOFT TISSUE
Qty: 6 ML | Refills: 2 | Status: SHIPPED | OUTPATIENT
Start: 2022-05-16 | End: 2022-10-28 | Stop reason: SDUPTHER

## 2022-05-17 ENCOUNTER — TELEHEALTH PROVIDED OTHER THAN IN PATIENT'S HOME (OUTPATIENT)
Dept: URBAN - METROPOLITAN AREA TELEHEALTH 12 | Facility: TELEHEALTH | Age: 67
End: 2022-05-17
Payer: COMMERCIAL

## 2022-05-17 VITALS — WEIGHT: 220 LBS | HEIGHT: 65 IN

## 2022-05-17 DIAGNOSIS — K22.70 BARRETT'S ESOPHAGUS WITHOUT DYSPLASIA: ICD-10-CM

## 2022-05-17 DIAGNOSIS — K21.9 GASTRO-ESOPHAGEAL REFLUX DISEASE WITHOUT ESOPHAGITIS: ICD-10-CM

## 2022-05-17 PROCEDURE — 99213 OFFICE O/P EST LOW 20 MIN: CPT | Mod: 95 | Performed by: PHYSICIAN ASSISTANT

## 2022-05-17 NOTE — SERVICEHPINOTES
PATIENT VERIFIED BY DATE OF BIRTH AND NAME. Patient has been consented for this telecommunication visit.   Ms. Nelson is here for f/u regarding a hx of Gilbert's esophagus. She does well on Dexilant daily and Pepcid too. She only has breakthrough symptoms once every other month. Seems to occur when she is stressed. She is eating well w/o dysphagia. No abdominal pain, weight loss, nausea, or vomiting problems. No blood in the stool or black stool. Due for the next EGD 01/2023 and will be due for her next colonoscopy in 2024. ROS as per HPI and o/w is unremarkable.

## 2022-06-16 ENCOUNTER — OFFICE VISIT (OUTPATIENT)
Dept: RHEUMATOLOGY | Facility: CLINIC | Age: 67
End: 2022-06-16
Payer: MEDICARE

## 2022-06-16 VITALS
SYSTOLIC BLOOD PRESSURE: 116 MMHG | BODY MASS INDEX: 34.68 KG/M2 | HEART RATE: 80 BPM | HEIGHT: 65 IN | DIASTOLIC BLOOD PRESSURE: 75 MMHG | WEIGHT: 208.13 LBS

## 2022-06-16 DIAGNOSIS — M47.817 LUMBAR AND SACRAL OSTEOARTHRITIS: ICD-10-CM

## 2022-06-16 DIAGNOSIS — M35.1 MIXED CONNECTIVE TISSUE DISEASE: ICD-10-CM

## 2022-06-16 DIAGNOSIS — F41.9 ANXIETY: ICD-10-CM

## 2022-06-16 DIAGNOSIS — M32.9 SYSTEMIC LUPUS ERYTHEMATOSUS, UNSPECIFIED SLE TYPE, UNSPECIFIED ORGAN INVOLVEMENT STATUS: ICD-10-CM

## 2022-06-16 DIAGNOSIS — M79.7 FIBROMYALGIA: ICD-10-CM

## 2022-06-16 DIAGNOSIS — M35.00 SJOGREN'S SYNDROME, WITH UNSPECIFIED ORGAN INVOLVEMENT: Primary | ICD-10-CM

## 2022-06-16 PROCEDURE — 99999 PR PBB SHADOW E&M-EST. PATIENT-LVL III: CPT | Mod: PBBFAC,,, | Performed by: INTERNAL MEDICINE

## 2022-06-16 PROCEDURE — 99213 OFFICE O/P EST LOW 20 MIN: CPT | Mod: PBBFAC,PN | Performed by: INTERNAL MEDICINE

## 2022-06-16 PROCEDURE — 96372 THER/PROPH/DIAG INJ SC/IM: CPT | Mod: S$GLB,,, | Performed by: INTERNAL MEDICINE

## 2022-06-16 PROCEDURE — 99214 OFFICE O/P EST MOD 30 MIN: CPT | Mod: 25,S$GLB,, | Performed by: INTERNAL MEDICINE

## 2022-06-16 PROCEDURE — 99214 PR OFFICE/OUTPT VISIT, EST, LEVL IV, 30-39 MIN: ICD-10-PCS | Mod: 25,S$GLB,, | Performed by: INTERNAL MEDICINE

## 2022-06-16 PROCEDURE — 99999 PR PBB SHADOW E&M-EST. PATIENT-LVL III: ICD-10-PCS | Mod: PBBFAC,,, | Performed by: INTERNAL MEDICINE

## 2022-06-16 PROCEDURE — 96372 PR INJECTION,THERAP/PROPH/DIAG2ST, IM OR SUBCUT: ICD-10-PCS | Mod: S$GLB,,, | Performed by: INTERNAL MEDICINE

## 2022-06-16 RX ORDER — KETOROLAC TROMETHAMINE 30 MG/ML
INJECTION, SOLUTION INTRAMUSCULAR; INTRAVENOUS
Qty: 2 ML | Refills: 6 | Status: SHIPPED | OUTPATIENT
Start: 2022-06-16 | End: 2022-10-28 | Stop reason: SDUPTHER

## 2022-06-16 RX ORDER — ESCITALOPRAM OXALATE 20 MG/1
20 TABLET ORAL DAILY
Qty: 90 TABLET | Refills: 3 | Status: SHIPPED | OUTPATIENT
Start: 2022-06-16 | End: 2022-12-19 | Stop reason: SDUPTHER

## 2022-06-16 RX ORDER — ALPRAZOLAM 0.25 MG/1
0.25 TABLET ORAL 4 TIMES DAILY PRN
Qty: 28 TABLET | Refills: 2 | Status: SHIPPED | OUTPATIENT
Start: 2022-06-16 | End: 2022-12-19 | Stop reason: SDUPTHER

## 2022-06-16 RX ORDER — CYANOCOBALAMIN 1000 UG/ML
1000 INJECTION, SOLUTION INTRAMUSCULAR; SUBCUTANEOUS
Status: COMPLETED | OUTPATIENT
Start: 2022-06-16 | End: 2022-06-16

## 2022-06-16 RX ORDER — METHYLPREDNISOLONE ACETATE 80 MG/ML
160 INJECTION, SUSPENSION INTRA-ARTICULAR; INTRALESIONAL; INTRAMUSCULAR; SOFT TISSUE
Status: DISCONTINUED | OUTPATIENT
Start: 2022-06-16 | End: 2022-06-16

## 2022-06-16 RX ORDER — KETOROLAC TROMETHAMINE 30 MG/ML
60 INJECTION, SOLUTION INTRAMUSCULAR; INTRAVENOUS
Status: COMPLETED | OUTPATIENT
Start: 2022-06-16 | End: 2022-06-16

## 2022-06-16 RX ADMIN — CYANOCOBALAMIN 1000 MCG: 1000 INJECTION, SOLUTION INTRAMUSCULAR; SUBCUTANEOUS at 04:06

## 2022-06-16 RX ADMIN — KETOROLAC TROMETHAMINE 60 MG: 30 INJECTION, SOLUTION INTRAMUSCULAR; INTRAVENOUS at 04:06

## 2022-06-16 NOTE — PROGRESS NOTES
2 pt identifiers used  Allergies reviewed      Administered 2 cc ( 30 mg/ml ) of toradol to the right upper outer gluteal. Patient tolerated injections well. Informed of s/s to report verbalized understanding. No adverse reactions noted.    Administered 1 cc ( 1000 mcg/ml ) of b12 to the left deltoid. Informed of s/s to report verbalized understanding. No adverse reactions noted.     Left facility in stable condition.              Answers for HPI/ROS submitted by the patient on 6/13/2022  fever: No  eye redness: No  mouth sores: No  headaches: No  shortness of breath: No  chest pain: No  trouble swallowing: No  diarrhea: No  constipation: No  unexpected weight change: No  genital sore: No  dysuria: No  During the last 3 days, have you had a skin rash?: No  Bruises or bleeds easily: Yes  cough: No

## 2022-06-16 NOTE — PROGRESS NOTES
Subjective:       Patient ID: Angelica Inman is a 66 y.o. female.    Chief Complaint: Disease Management    Follow up: 65 year old female w SLE and Sjogren's syndrome.she just got her  R eye cataract surgery and her granddaughter  age 14,  She complains of joint pain involving her wrists, ankles, hips, and low back. Low back pain is constant, aching, with radiation down the L leg. Gabapentin causes grogginess at higher doses and she can only tolerate 100 mg qhs. Her insomnia is severe due to anxiety.  She is using topical salon pas for her lumbar spine.     Other sx include malar rash, hair loss, oral/nasal ulcers, dry eyes, dry mouth, and fatigue.     Current tx:  1. Plaquenil 200 mg bid            She complains of joint swelling. Pertinent negatives include no dysuria, fever, trouble swallowing or headaches.         Review of Systems   Constitutional: Positive for activity change and unexpected weight change. Negative for appetite change and fever.   HENT: Negative for mouth sores and trouble swallowing.         Oral/nasal ulcers   Eyes: Negative for redness and visual disturbance.        Dry eyes   Respiratory: Negative for cough and shortness of breath.    Cardiovascular: Negative for chest pain, palpitations and leg swelling.   Gastrointestinal: Negative for constipation and diarrhea.   Genitourinary: Negative for dysuria and genital sores.   Musculoskeletal: Positive for back pain, joint swelling and neck pain.   Skin: Negative for rash.        Hair loss   Allergic/Immunologic: Negative for immunocompromised state.   Neurological: Negative for dizziness, light-headedness and headaches.   Hematological: Bruises/bleeds easily.   Psychiatric/Behavioral: Negative for dysphoric mood. The patient is not nervous/anxious.          Objective:     Vitals:    22 1420   BP: 116/75   Pulse: 80       Past Medical History:   Diagnosis Date    Acid reflux     Anxiety     Arthritis     Degenerative disc  disease     Depression     Dry eyes     Dry mouth     Hypertension      Past Surgical History:   Procedure Laterality Date    APPENDECTOMY      HYSTERECTOMY      LIPOMA RESECTION      c spine area    THORACIC DISCECTOMY      disc rupture    TONSILLECTOMY            Physical Exam   Constitutional: She is oriented to person, place, and time. No distress.   HENT:   Head: Normocephalic and atraumatic.   Eyes: Pupils are equal, round, and reactive to light. Right eye exhibits no discharge. Left eye exhibits no discharge. Right conjunctiva is not injected. Left conjunctiva is not injected. Right eye exhibits normal extraocular motion. Left eye exhibits normal extraocular motion.   Neck: No JVD present. No thyromegaly present.   Cardiovascular: Normal rate, regular rhythm and normal heart sounds. Exam reveals no gallop, no friction rub and no decreased pulses.   No murmur heard.  Pulmonary/Chest: Breath sounds normal. She has no wheezes. She has no rhonchi. She has no rales. She exhibits no tenderness.   Abdominal: There is no abdominal tenderness. There is no rebound and no guarding.   Musculoskeletal:         General: Tenderness and deformity present.      Right shoulder: Normal.      Left shoulder: Normal.      Right elbow: Normal.      Left elbow: Normal.      Right wrist: Swelling and tenderness present.      Left wrist: Normal.      Cervical back: Neck supple.      Right knee: Normal.      Left knee: Normal.   Lymphadenopathy:     She has no cervical adenopathy.   Neurological: She is alert and oriented to person, place, and time. Gait normal.   Skin: Skin is dry. No rash noted. No erythema. There is pallor.   Psychiatric: Mood and affect normal.   Vitals reviewed.      Right Side Rheumatological Exam     Examination finds the shoulder, elbow, knee, 1st PIP, 2nd PIP, 3rd PIP, 4th PIP and 5th PIP normal.    The patient is tender to palpation of the wrist, 1st PIP, 1st MCP, 2nd PIP, 2nd MCP, 3rd PIP, 3rd MCP,  4th PIP, 4th MCP, 5th PIP and 5th MCP    She has swelling of the wrist, 1st PIP, 1st MCP, 2nd PIP, 2nd MCP, 3rd PIP, 3rd MCP, 4th PIP, 4th MCP, 5th PIP and 5th MCP    The patient has an enlarged 2nd DIP, 3rd DIP, 4th DIP and 5th DIP    Shoulder Exam   Tenderness Location: no tenderness    Range of Motion   Active abduction: abnormal   Adduction: abnormal  Sensation: normal    Knee Exam   Patellofemoral Crepitus: positive  Sensation: normal    Hip Exam   Tenderness Location: posterior  Sensation: normal    Elbow/Wrist Exam   Tenderness Location: no tenderness  Sensation: normal    Left Side Rheumatological Exam     Examination finds the shoulder, elbow, wrist, knee, 1st PIP, 1st MCP, 2nd PIP, 2nd MCP, 3rd PIP, 3rd MCP, 4th PIP, 4th MCP, 5th PIP and 5th MCP normal.    The patient is tender to palpation of the 1st PIP, 1st MCP, 2nd PIP, 2nd MCP, 3rd PIP, 3rd MCP, 4th PIP, 4th MCP, 5th PIP and 5th MCP.    She has swelling of the 1st PIP, 1st MCP, 2nd PIP, 2nd MCP, 3rd PIP, 3rd MCP, 4th PIP, 4th MCP, 5th PIP and 5th MCP    Shoulder Exam   Tenderness Location: no tenderness    Range of Motion   Active abduction: abnormal   Sensation: normal    Knee Exam     Patellofemoral Crepitus: positive  Sensation: normal    Hip Exam   Tenderness Location: posterior  Sensation: normal    Elbow/Wrist Exam   Sensation: normal      Back/Neck Exam   General Inspection   Gait: normal              Labs done 6/14/22 cbc, cmp esr, crp vitd wnl    Assessment:       1. Sjogren's syndrome, with unspecified organ involvement    2. Systemic lupus erythematosus, unspecified SLE type, unspecified organ involvement status    3. Fibromyalgia    4. Lumbar and sacral osteoarthritis    5. Mixed connective tissue disease    6. Anxiety            Plan:       Sjogren's syndrome, with unspecified organ involvement  -     ketorolac injection 60 mg  -     Discontinue: methylPREDNISolone acetate injection 160 mg  -     cyanocobalamin injection 1,000 mcg  -      ketorolac (TORADOL) 60 mg/2 mL Soln; 2 ml sq monthly  Dispense: 2 mL; Refill: 6  -     EScitalopram oxalate (LEXAPRO) 20 MG tablet; Take 1 tablet (20 mg total) by mouth once daily.  Dispense: 90 tablet; Refill: 3  -     CBC Auto Differential; Future; Expected date: 06/16/2022  -     Comprehensive Metabolic Panel; Future; Expected date: 06/16/2022  -     Sedimentation rate; Future; Expected date: 06/16/2022  -     C-Reactive Protein; Future; Expected date: 06/16/2022  -     TSH; Future; Expected date: 06/16/2022  -     T4, Free; Future; Expected date: 06/16/2022  -     Sjogrens syndrome-A extractable nuclear antibody; Future; Expected date: 06/16/2022  -     Sjogrens syndrome-B extractable nuclear antibody; Future; Expected date: 06/16/2022  -     Vitamin D; Future; Expected date: 06/16/2022    Systemic lupus erythematosus, unspecified SLE type, unspecified organ involvement status  -     ketorolac injection 60 mg  -     Discontinue: methylPREDNISolone acetate injection 160 mg  -     cyanocobalamin injection 1,000 mcg  -     ketorolac (TORADOL) 60 mg/2 mL Soln; 2 ml sq monthly  Dispense: 2 mL; Refill: 6  -     EScitalopram oxalate (LEXAPRO) 20 MG tablet; Take 1 tablet (20 mg total) by mouth once daily.  Dispense: 90 tablet; Refill: 3  -     CBC Auto Differential; Future; Expected date: 06/16/2022  -     Comprehensive Metabolic Panel; Future; Expected date: 06/16/2022  -     Sedimentation rate; Future; Expected date: 06/16/2022  -     C-Reactive Protein; Future; Expected date: 06/16/2022  -     TSH; Future; Expected date: 06/16/2022  -     T4, Free; Future; Expected date: 06/16/2022  -     Sjogrens syndrome-A extractable nuclear antibody; Future; Expected date: 06/16/2022  -     Sjogrens syndrome-B extractable nuclear antibody; Future; Expected date: 06/16/2022  -     Vitamin D; Future; Expected date: 06/16/2022    Fibromyalgia  -     ketorolac injection 60 mg  -     Discontinue: methylPREDNISolone acetate injection  160 mg  -     cyanocobalamin injection 1,000 mcg  -     ketorolac (TORADOL) 60 mg/2 mL Soln; 2 ml sq monthly  Dispense: 2 mL; Refill: 6  -     EScitalopram oxalate (LEXAPRO) 20 MG tablet; Take 1 tablet (20 mg total) by mouth once daily.  Dispense: 90 tablet; Refill: 3  -     CBC Auto Differential; Future; Expected date: 06/16/2022  -     Comprehensive Metabolic Panel; Future; Expected date: 06/16/2022  -     Sedimentation rate; Future; Expected date: 06/16/2022  -     C-Reactive Protein; Future; Expected date: 06/16/2022  -     TSH; Future; Expected date: 06/16/2022  -     T4, Free; Future; Expected date: 06/16/2022  -     Sjogrens syndrome-A extractable nuclear antibody; Future; Expected date: 06/16/2022  -     Sjogrens syndrome-B extractable nuclear antibody; Future; Expected date: 06/16/2022  -     Vitamin D; Future; Expected date: 06/16/2022    Lumbar and sacral osteoarthritis  -     ketorolac injection 60 mg  -     Discontinue: methylPREDNISolone acetate injection 160 mg  -     cyanocobalamin injection 1,000 mcg  -     ketorolac (TORADOL) 60 mg/2 mL Soln; 2 ml sq monthly  Dispense: 2 mL; Refill: 6  -     EScitalopram oxalate (LEXAPRO) 20 MG tablet; Take 1 tablet (20 mg total) by mouth once daily.  Dispense: 90 tablet; Refill: 3  -     CBC Auto Differential; Future; Expected date: 06/16/2022  -     Comprehensive Metabolic Panel; Future; Expected date: 06/16/2022  -     Sedimentation rate; Future; Expected date: 06/16/2022  -     C-Reactive Protein; Future; Expected date: 06/16/2022  -     TSH; Future; Expected date: 06/16/2022  -     T4, Free; Future; Expected date: 06/16/2022  -     Sjogrens syndrome-A extractable nuclear antibody; Future; Expected date: 06/16/2022  -     Sjogrens syndrome-B extractable nuclear antibody; Future; Expected date: 06/16/2022  -     Vitamin D; Future; Expected date: 06/16/2022    Mixed connective tissue disease  -     ketorolac injection 60 mg  -     Discontinue: methylPREDNISolone  acetate injection 160 mg  -     cyanocobalamin injection 1,000 mcg  -     CBC Auto Differential; Future; Expected date: 06/16/2022  -     Comprehensive Metabolic Panel; Future; Expected date: 06/16/2022  -     Sedimentation rate; Future; Expected date: 06/16/2022  -     C-Reactive Protein; Future; Expected date: 06/16/2022  -     TSH; Future; Expected date: 06/16/2022  -     T4, Free; Future; Expected date: 06/16/2022  -     Sjogrens syndrome-A extractable nuclear antibody; Future; Expected date: 06/16/2022  -     Sjogrens syndrome-B extractable nuclear antibody; Future; Expected date: 06/16/2022  -     Vitamin D; Future; Expected date: 06/16/2022    Anxiety  -     ALPRAZolam (XANAX) 0.25 MG tablet; Take 1 tablet (0.25 mg total) by mouth 4 (four) times daily as needed for Anxiety.  Dispense: 28 tablet; Refill: 2  -     CBC Auto Differential; Future; Expected date: 06/16/2022  -     Comprehensive Metabolic Panel; Future; Expected date: 06/16/2022  -     Sedimentation rate; Future; Expected date: 06/16/2022  -     C-Reactive Protein; Future; Expected date: 06/16/2022  -     TSH; Future; Expected date: 06/16/2022  -     T4, Free; Future; Expected date: 06/16/2022  -     Sjogrens syndrome-A extractable nuclear antibody; Future; Expected date: 06/16/2022  -     Sjogrens syndrome-B extractable nuclear antibody; Future; Expected date: 06/16/2022  -     Vitamin D; Future; Expected date: 06/16/2022      More than 50% of the  40 minute encounter was spent face to face counseling the patient regarding current status and future plan of care as well as side of the medications. All questions were answered to patient's satisfaction    Assessment:  64 year old female with  SLE, Sjogren's syndrome on plaquenil  --fibromyalgia  --lumbar and sacral arthritis  --chronic pain syndrome on tylenol 3 sparingly    Plan:  1.  Cont. Gabapentin 100 mg qhs. Only using zanaflex when doing  poorly  2. Cont. Plaquenil 200 mg bid  3. Cont.  Toradol/depo every 6-8 wks prn  5. Medications refilled  6.  voltaren gel pt will get OTC

## 2022-10-24 ENCOUNTER — TELEPHONE (OUTPATIENT)
Dept: RHEUMATOLOGY | Facility: CLINIC | Age: 67
End: 2022-10-24
Payer: MEDICARE

## 2022-10-24 NOTE — TELEPHONE ENCOUNTER
Called patient and she states patient states she has a leaky valve and she would like Dr. Moctezuma to look over the report for her recommendations advise the patient to get the report from the facility and have them fax it to the office fax number provider understanding verbalized     patient also states she is tired all the time and weak       ----- Message from Tanika Stewart sent at 10/24/2022 10:06 AM CDT -----  Contact: Patient  Type:  Patient Call          Who Called: patient     Does the patient know what this is regarding?: requesting a callback to discuss some health changes ; also refills on RxmethylPREDNISolone acetate (DEPO-MEDROL) 80 mg/mL injection & ketorolac (TORADOL) 60 mg/2 mL Soln ,  ; Please advise           Would the patient rather a call back or a response via [facility]? Call           Best Call Back Number:[phone]             Additional Information:      [facility] [address] [address]  Phone: [phone] Fax: [phone]

## 2022-10-27 ENCOUNTER — PATIENT MESSAGE (OUTPATIENT)
Dept: RHEUMATOLOGY | Facility: CLINIC | Age: 67
End: 2022-10-27
Payer: MEDICARE

## 2022-10-27 ENCOUNTER — TELEPHONE (OUTPATIENT)
Dept: RHEUMATOLOGY | Facility: CLINIC | Age: 67
End: 2022-10-27
Payer: MEDICARE

## 2022-10-27 DIAGNOSIS — M32.9 SYSTEMIC LUPUS ERYTHEMATOSUS, UNSPECIFIED SLE TYPE, UNSPECIFIED ORGAN INVOLVEMENT STATUS: ICD-10-CM

## 2022-10-27 DIAGNOSIS — B37.9 CANDIDA ALBICANS INFECTION: ICD-10-CM

## 2022-10-27 DIAGNOSIS — M79.7 FIBROMYALGIA: ICD-10-CM

## 2022-10-27 DIAGNOSIS — R42 DIZZINESS: ICD-10-CM

## 2022-10-27 DIAGNOSIS — M35.00 SJOGREN'S SYNDROME, WITH UNSPECIFIED ORGAN INVOLVEMENT: ICD-10-CM

## 2022-10-27 DIAGNOSIS — R09.81 SINUS CONGESTION: ICD-10-CM

## 2022-10-27 DIAGNOSIS — M47.817 LUMBAR AND SACRAL OSTEOARTHRITIS: ICD-10-CM

## 2022-10-27 NOTE — TELEPHONE ENCOUNTER
----- Message from Rocio Jacobs sent at 10/27/2022  1:12 PM CDT -----  Patient Call Back    Who Called: PT     What is the reqeust in detail: PT CALLING TO SPEAK WITH SOMEONE REGARDING PAPER WORK FROM HER CARDIOLOGIST. THE PT STATED THAT SHE IS NOT FEELING WELL AT ALL. PLS ADVISE.     Can the clinic reply by [facility]?    Best Call Back Number: [phone]

## 2022-10-27 NOTE — TELEPHONE ENCOUNTER
Spoke to patient on 10/24 informed the patient I would send the provider the message message sent patient called back today message printed for staff to give to provider for review message sent to in office staff to give to provider  ----- Message from Rafia Dean sent at 10/26/2022 10:02 AM CDT -----  Contact: self  Type: Needs Medical Advice  Who Called: Patient   Best Call Back Number: [phone]  Additional Information: Patient states she has a leaky valve in her heart and also is feeling weak and tired all the time also experiencing major dizziness . This is the  pt second call and been calling since Monday. Plz call out to pt today. Thanks

## 2022-10-28 RX ORDER — METHYLPREDNISOLONE ACETATE 80 MG/ML
160 INJECTION, SUSPENSION INTRA-ARTICULAR; INTRALESIONAL; INTRAMUSCULAR; SOFT TISSUE
Qty: 6 ML | Refills: 2 | Status: SHIPPED | OUTPATIENT
Start: 2022-10-28 | End: 2022-12-19 | Stop reason: SDUPTHER

## 2022-10-28 RX ORDER — KETOROLAC TROMETHAMINE 30 MG/ML
INJECTION, SOLUTION INTRAMUSCULAR; INTRAVENOUS
Qty: 2 ML | Refills: 6 | Status: SHIPPED | OUTPATIENT
Start: 2022-10-28 | End: 2022-12-19 | Stop reason: SDUPTHER

## 2022-11-07 ENCOUNTER — PATIENT MESSAGE (OUTPATIENT)
Dept: RHEUMATOLOGY | Facility: CLINIC | Age: 67
End: 2022-11-07
Payer: MEDICARE

## 2022-11-08 ENCOUNTER — TELEPHONE (OUTPATIENT)
Dept: RHEUMATOLOGY | Facility: CLINIC | Age: 67
End: 2022-11-08
Payer: MEDICARE

## 2022-11-08 ENCOUNTER — PATIENT MESSAGE (OUTPATIENT)
Dept: RHEUMATOLOGY | Facility: CLINIC | Age: 67
End: 2022-11-08
Payer: MEDICARE

## 2022-11-08 NOTE — TELEPHONE ENCOUNTER
Called patient to answer questions about lab work being sent to [facility].  Faxed labs to [phone]                                         #[phone]

## 2022-11-21 ENCOUNTER — PATIENT MESSAGE (OUTPATIENT)
Dept: RHEUMATOLOGY | Facility: CLINIC | Age: 67
End: 2022-11-21
Payer: MEDICARE

## 2022-12-19 ENCOUNTER — OFFICE VISIT (OUTPATIENT)
Dept: RHEUMATOLOGY | Facility: CLINIC | Age: 67
End: 2022-12-19
Payer: MEDICARE

## 2022-12-19 VITALS
DIASTOLIC BLOOD PRESSURE: 77 MMHG | BODY MASS INDEX: 36.36 KG/M2 | WEIGHT: 218.25 LBS | HEART RATE: 76 BPM | HEIGHT: 65 IN | SYSTOLIC BLOOD PRESSURE: 120 MMHG

## 2022-12-19 DIAGNOSIS — B37.9 CANDIDA ALBICANS INFECTION: ICD-10-CM

## 2022-12-19 DIAGNOSIS — M32.9 SYSTEMIC LUPUS ERYTHEMATOSUS, UNSPECIFIED SLE TYPE, UNSPECIFIED ORGAN INVOLVEMENT STATUS: ICD-10-CM

## 2022-12-19 DIAGNOSIS — M79.7 FIBROMYALGIA: ICD-10-CM

## 2022-12-19 DIAGNOSIS — I34.0 MITRAL VALVE INSUFFICIENCY, UNSPECIFIED ETIOLOGY: Primary | ICD-10-CM

## 2022-12-19 DIAGNOSIS — I07.1 TRICUSPID VALVE INSUFFICIENCY, UNSPECIFIED ETIOLOGY: ICD-10-CM

## 2022-12-19 DIAGNOSIS — F41.9 ANXIETY: ICD-10-CM

## 2022-12-19 DIAGNOSIS — M47.817 LUMBAR AND SACRAL OSTEOARTHRITIS: ICD-10-CM

## 2022-12-19 DIAGNOSIS — R09.81 SINUS CONGESTION: ICD-10-CM

## 2022-12-19 DIAGNOSIS — M35.00 SJOGREN'S SYNDROME, WITH UNSPECIFIED ORGAN INVOLVEMENT: ICD-10-CM

## 2022-12-19 DIAGNOSIS — G89.4 CHRONIC PAIN SYNDROME: ICD-10-CM

## 2022-12-19 DIAGNOSIS — M35.1 MIXED CONNECTIVE TISSUE DISEASE: ICD-10-CM

## 2022-12-19 DIAGNOSIS — R42 DIZZINESS: ICD-10-CM

## 2022-12-19 DIAGNOSIS — I10 ESSENTIAL HYPERTENSION: ICD-10-CM

## 2022-12-19 DIAGNOSIS — H81.03 VERTIGO, LABYRINTHINE, BILATERAL: ICD-10-CM

## 2022-12-19 PROCEDURE — 1100F PTFALLS ASSESS-DOCD GE2>/YR: CPT | Mod: CPTII,S$GLB,, | Performed by: INTERNAL MEDICINE

## 2022-12-19 PROCEDURE — 3288F FALL RISK ASSESSMENT DOCD: CPT | Mod: CPTII,S$GLB,, | Performed by: INTERNAL MEDICINE

## 2022-12-19 PROCEDURE — 1100F PR PT FALLS ASSESS DOC 2+ FALLS/FALL W/INJURY/YR: ICD-10-PCS | Mod: CPTII,S$GLB,, | Performed by: INTERNAL MEDICINE

## 2022-12-19 PROCEDURE — 1159F MED LIST DOCD IN RCRD: CPT | Mod: CPTII,S$GLB,, | Performed by: INTERNAL MEDICINE

## 2022-12-19 PROCEDURE — 3078F PR MOST RECENT DIASTOLIC BLOOD PRESSURE < 80 MM HG: ICD-10-PCS | Mod: CPTII,S$GLB,, | Performed by: INTERNAL MEDICINE

## 2022-12-19 PROCEDURE — 99215 PR OFFICE/OUTPT VISIT, EST, LEVL V, 40-54 MIN: ICD-10-PCS | Mod: 25,S$GLB,, | Performed by: INTERNAL MEDICINE

## 2022-12-19 PROCEDURE — 3078F DIAST BP <80 MM HG: CPT | Mod: CPTII,S$GLB,, | Performed by: INTERNAL MEDICINE

## 2022-12-19 PROCEDURE — 4010F PR ACE/ARB THEARPY RXD/TAKEN: ICD-10-PCS | Mod: CPTII,S$GLB,, | Performed by: INTERNAL MEDICINE

## 2022-12-19 PROCEDURE — 99999 PR PBB SHADOW E&M-EST. PATIENT-LVL IV: CPT | Mod: PBBFAC,,, | Performed by: INTERNAL MEDICINE

## 2022-12-19 PROCEDURE — 99215 OFFICE O/P EST HI 40 MIN: CPT | Mod: 25,S$GLB,, | Performed by: INTERNAL MEDICINE

## 2022-12-19 PROCEDURE — 3008F PR BODY MASS INDEX (BMI) DOCUMENTED: ICD-10-PCS | Mod: CPTII,S$GLB,, | Performed by: INTERNAL MEDICINE

## 2022-12-19 PROCEDURE — 4010F ACE/ARB THERAPY RXD/TAKEN: CPT | Mod: CPTII,S$GLB,, | Performed by: INTERNAL MEDICINE

## 2022-12-19 PROCEDURE — 3008F BODY MASS INDEX DOCD: CPT | Mod: CPTII,S$GLB,, | Performed by: INTERNAL MEDICINE

## 2022-12-19 PROCEDURE — 1159F PR MEDICATION LIST DOCUMENTED IN MEDICAL RECORD: ICD-10-PCS | Mod: CPTII,S$GLB,, | Performed by: INTERNAL MEDICINE

## 2022-12-19 PROCEDURE — 3074F SYST BP LT 130 MM HG: CPT | Mod: CPTII,S$GLB,, | Performed by: INTERNAL MEDICINE

## 2022-12-19 PROCEDURE — 3074F PR MOST RECENT SYSTOLIC BLOOD PRESSURE < 130 MM HG: ICD-10-PCS | Mod: CPTII,S$GLB,, | Performed by: INTERNAL MEDICINE

## 2022-12-19 PROCEDURE — 3288F PR FALLS RISK ASSESSMENT DOCUMENTED: ICD-10-PCS | Mod: CPTII,S$GLB,, | Performed by: INTERNAL MEDICINE

## 2022-12-19 PROCEDURE — 99999 PR PBB SHADOW E&M-EST. PATIENT-LVL IV: ICD-10-PCS | Mod: PBBFAC,,, | Performed by: INTERNAL MEDICINE

## 2022-12-19 RX ORDER — MECLIZINE HYDROCHLORIDE 25 MG/1
25 TABLET ORAL 3 TIMES DAILY PRN
Qty: 90 TABLET | Refills: 3 | Status: SHIPPED | OUTPATIENT
Start: 2022-12-19

## 2022-12-19 RX ORDER — VALSARTAN 320 MG/1
320 TABLET ORAL DAILY
Qty: 90 TABLET | Refills: 3 | Status: SHIPPED | OUTPATIENT
Start: 2022-12-19 | End: 2023-01-19 | Stop reason: SDUPTHER

## 2022-12-19 RX ORDER — POTASSIUM CHLORIDE 750 MG/1
10 TABLET, EXTENDED RELEASE ORAL DAILY
Qty: 90 TABLET | Refills: 3 | Status: SHIPPED | OUTPATIENT
Start: 2022-12-19 | End: 2023-05-24 | Stop reason: SDUPTHER

## 2022-12-19 RX ORDER — GABAPENTIN 100 MG/1
CAPSULE ORAL
Qty: 270 CAPSULE | Refills: 3 | Status: SHIPPED | OUTPATIENT
Start: 2022-12-19 | End: 2023-05-24

## 2022-12-19 RX ORDER — KETOROLAC TROMETHAMINE 30 MG/ML
INJECTION, SOLUTION INTRAMUSCULAR; INTRAVENOUS
Qty: 2 ML | Refills: 6 | Status: SHIPPED | OUTPATIENT
Start: 2022-12-19 | End: 2023-05-24 | Stop reason: SDUPTHER

## 2022-12-19 RX ORDER — ALPRAZOLAM 0.25 MG/1
0.25 TABLET ORAL 4 TIMES DAILY PRN
Qty: 28 TABLET | Refills: 2 | Status: SHIPPED | OUTPATIENT
Start: 2022-12-19 | End: 2023-05-24 | Stop reason: SDUPTHER

## 2022-12-19 RX ORDER — HYDROCHLOROTHIAZIDE 25 MG/1
25 TABLET ORAL DAILY
Qty: 90 TABLET | Refills: 3 | Status: SHIPPED | OUTPATIENT
Start: 2022-12-19 | End: 2023-12-19

## 2022-12-19 RX ORDER — ESCITALOPRAM OXALATE 20 MG/1
20 TABLET ORAL DAILY
Qty: 90 TABLET | Refills: 3 | Status: SHIPPED | OUTPATIENT
Start: 2022-12-19 | End: 2023-05-24 | Stop reason: SDUPTHER

## 2022-12-19 RX ORDER — METOPROLOL TARTRATE 100 MG/1
100 TABLET ORAL 2 TIMES DAILY
Qty: 180 TABLET | Refills: 3 | Status: SHIPPED | OUTPATIENT
Start: 2022-12-19 | End: 2023-12-19

## 2022-12-19 RX ORDER — FLUCONAZOLE 150 MG/1
150 TABLET ORAL DAILY
Qty: 5 TABLET | Refills: 2 | Status: SHIPPED | OUTPATIENT
Start: 2022-12-19

## 2022-12-19 RX ORDER — HYDROXYCHLOROQUINE SULFATE 200 MG/1
200 TABLET, FILM COATED ORAL 2 TIMES DAILY
Qty: 180 TABLET | Refills: 3 | Status: SHIPPED | OUTPATIENT
Start: 2022-12-19 | End: 2023-05-24 | Stop reason: SDUPTHER

## 2022-12-19 RX ORDER — METHYLPREDNISOLONE ACETATE 80 MG/ML
160 INJECTION, SUSPENSION INTRA-ARTICULAR; INTRALESIONAL; INTRAMUSCULAR; SOFT TISSUE
Qty: 6 ML | Refills: 2 | Status: SHIPPED | OUTPATIENT
Start: 2022-12-19 | End: 2023-05-24 | Stop reason: SDUPTHER

## 2022-12-19 NOTE — PROGRESS NOTES
Subjective:       Patient ID: Angelica Inman is a 67 y.o. female.    Chief Complaint: Disease Management    Follow up: 65 year old female w SLE and Sjogren's syndrome.she just got her  B eye cataract surgery and her granddaughter  age 14,  She complains of joint pain involving her wrists, ankles, hips, and low back. Low back pain is constant, aching, with radiation down the L leg. Gabapentin causes grogginess at higher doses and she can only tolerate 100 mg qhs. Her insomnia is severe due to anxiety.  She is using topical salon pas for her lumbar spine.     Other sx include malar rash, hair loss, oral/nasal ulcers, dry eyes, dry mouth, and fatigue.     Current tx:  1. Plaquenil 200 mg bid    Review of Systems   Constitutional:  Positive for activity change, fatigue and unexpected weight change. Negative for appetite change.   HENT:  Negative for mouth sores.         Oral/nasal ulcers   Eyes:  Negative for redness and visual disturbance.        Dry eyes   Respiratory:  Negative for cough and shortness of breath.    Cardiovascular:  Negative for chest pain, palpitations and leg swelling.   Gastrointestinal:  Negative for constipation and diarrhea.   Genitourinary:  Negative for genital sores.   Musculoskeletal:  Positive for back pain and neck pain.   Skin:  Negative for rash.        Hair loss   Allergic/Immunologic: Negative for immunocompromised state.   Neurological:  Negative for dizziness and light-headedness.   Hematological:  Bruises/bleeds easily.   Psychiatric/Behavioral:  Negative for dysphoric mood. The patient is not nervous/anxious.        Objective:     Vitals:    22 1321   BP: 120/77   Pulse: 76       Past Medical History:   Diagnosis Date    Acid reflux     Anxiety     Arthritis     Degenerative disc disease     Depression     Dry eyes     Dry mouth     Hypertension      Past Surgical History:   Procedure Laterality Date    APPENDECTOMY      HYSTERECTOMY      LIPOMA RESECTION      c  spine area    THORACIC DISCECTOMY      disc rupture    TONSILLECTOMY            Physical Exam   Constitutional: She is oriented to person, place, and time. No distress.   HENT:   Head: Normocephalic and atraumatic.   Eyes: Pupils are equal, round, and reactive to light. Right eye exhibits no discharge. Left eye exhibits no discharge. Right conjunctiva is not injected. Left conjunctiva is not injected. Right eye exhibits normal extraocular motion. Left eye exhibits normal extraocular motion.   Neck: No JVD present. No thyromegaly present.   Cardiovascular: Normal rate, regular rhythm and normal heart sounds. Exam reveals no gallop, no friction rub and no decreased pulses.   No murmur heard.  Pulmonary/Chest: Breath sounds normal. She has no wheezes. She has no rhonchi. She has no rales. She exhibits no tenderness.   Abdominal: There is no abdominal tenderness. There is no rebound and no guarding.   Musculoskeletal:         General: Tenderness and deformity present.      Right shoulder: Normal.      Left shoulder: Normal.      Right elbow: Normal.      Left elbow: Normal.      Right wrist: Swelling and tenderness present.      Left wrist: Normal.      Cervical back: Neck supple.      Right knee: Normal.      Left knee: Normal.   Lymphadenopathy:     She has no cervical adenopathy.   Neurological: She is alert and oriented to person, place, and time. Gait normal.   Skin: Skin is dry. No rash noted. No erythema. There is pallor.   Psychiatric: Mood and affect normal.   Vitals reviewed.      Right Side Rheumatological Exam     Examination finds the shoulder, elbow, knee, 1st PIP, 2nd PIP, 3rd PIP, 4th PIP and 5th PIP normal.    The patient is tender to palpation of the wrist, 1st PIP, 1st MCP, 2nd PIP, 2nd MCP, 3rd PIP, 3rd MCP, 4th PIP, 4th MCP, 5th PIP and 5th MCP    She has swelling of the wrist, 1st PIP, 1st MCP, 2nd PIP, 2nd MCP, 3rd PIP, 3rd MCP, 4th PIP, 4th MCP, 5th PIP and 5th MCP    The patient has an enlarged  2nd DIP, 3rd DIP, 4th DIP and 5th DIP    Shoulder Exam   Tenderness Location: no tenderness    Range of Motion   Active abduction:  abnormal   Adduction: abnormal  Sensation: normal    Knee Exam   Patellofemoral Crepitus: positive  Sensation: normal    Hip Exam   Tenderness Location: posterior  Sensation: normal    Elbow/Wrist Exam   Tenderness Location: no tenderness  Sensation: normal    Left Side Rheumatological Exam     Examination finds the shoulder, elbow, wrist, knee, 1st PIP, 1st MCP, 2nd PIP, 2nd MCP, 3rd PIP, 3rd MCP, 4th PIP, 4th MCP, 5th PIP and 5th MCP normal.    The patient is tender to palpation of the 1st PIP, 1st MCP, 2nd PIP, 2nd MCP, 3rd PIP, 3rd MCP, 4th PIP, 4th MCP, 5th PIP and 5th MCP.    She has swelling of the 1st PIP, 1st MCP, 2nd PIP, 2nd MCP, 3rd PIP, 3rd MCP, 4th PIP, 4th MCP, 5th PIP and 5th MCP    Shoulder Exam   Tenderness Location: no tenderness    Range of Motion   Active abduction:  abnormal   Sensation: normal    Knee Exam     Patellofemoral Crepitus: positive  Sensation: normal    Hip Exam   Tenderness Location: posterior  Sensation: normal    Elbow/Wrist Exam   Sensation: normal      Back/Neck Exam   General Inspection   Gait: normal            Labs done 6/14/22 cbc, cmp esr, crp vitd wnl    Assessment:       1. Mitral valve insufficiency, unspecified etiology    2. Tricuspid valve insufficiency, unspecified etiology    3. Systemic lupus erythematosus, unspecified SLE type, unspecified organ involvement status    4. Sjogren's syndrome, with unspecified organ involvement    5. Essential hypertension    6. Vertigo, labyrinthine, bilateral    7. Anxiety    8. Fibromyalgia    9. Lumbar and sacral osteoarthritis    10. Sinus congestion    11. Dizziness    12. Chronic pain syndrome    13. Mixed connective tissue disease    14. Candida albicans infection              Plan:       Mitral valve insufficiency, unspecified etiology  -     Ambulatory referral/consult to Cardiology; Future;  Expected date: 12/26/2022  -     CBC Auto Differential; Future; Expected date: 12/19/2022  -     Comprehensive Metabolic Panel; Future; Expected date: 12/19/2022  -     MARTI Screen w/Reflex; Future; Expected date: 12/19/2022  -     Sedimentation rate; Future; Expected date: 12/19/2022  -     Sjogrens syndrome-A extractable nuclear antibody; Future; Expected date: 12/19/2022  -     Sjogrens syndrome-B extractable nuclear antibody; Future; Expected date: 12/19/2022  -     C-Reactive Protein; Future; Expected date: 12/19/2022  -     Vitamin D; Future; Expected date: 12/19/2022  -     TSH; Future; Expected date: 12/19/2022  -     T4, Free; Future; Expected date: 12/19/2022  -     T3; Future; Expected date: 12/19/2022    Tricuspid valve insufficiency, unspecified etiology  -     Ambulatory referral/consult to Cardiology; Future; Expected date: 12/26/2022  -     CBC Auto Differential; Future; Expected date: 12/19/2022  -     Comprehensive Metabolic Panel; Future; Expected date: 12/19/2022  -     MARTI Screen w/Reflex; Future; Expected date: 12/19/2022  -     Sedimentation rate; Future; Expected date: 12/19/2022  -     Sjogrens syndrome-A extractable nuclear antibody; Future; Expected date: 12/19/2022  -     Sjogrens syndrome-B extractable nuclear antibody; Future; Expected date: 12/19/2022  -     C-Reactive Protein; Future; Expected date: 12/19/2022  -     Vitamin D; Future; Expected date: 12/19/2022  -     TSH; Future; Expected date: 12/19/2022  -     T4, Free; Future; Expected date: 12/19/2022  -     T3; Future; Expected date: 12/19/2022    Systemic lupus erythematosus, unspecified SLE type, unspecified organ involvement status  -     Ambulatory referral/consult to Cardiology; Future; Expected date: 12/26/2022  -     ALPRAZolam (XANAX) 0.25 MG tablet; Take 1 tablet (0.25 mg total) by mouth 4 (four) times daily as needed for Anxiety.  Dispense: 28 tablet; Refill: 2  -     gabapentin (NEURONTIN) 100 MG capsule; TAKE 1  CAPSULE BY MOUTH 3  TIMES DAILY  Dispense: 270 capsule; Refill: 3  -     EScitalopram oxalate (LEXAPRO) 20 MG tablet; Take 1 tablet (20 mg total) by mouth once daily.  Dispense: 90 tablet; Refill: 3  -     hydroCHLOROthiazide (HYDRODIURIL) 25 MG tablet; Take 1 tablet (25 mg total) by mouth once daily.  Dispense: 90 tablet; Refill: 3  -     potassium chloride (KLOR-CON) 10 MEQ TbSR; Take 1 tablet (10 mEq total) by mouth once daily.  Dispense: 90 tablet; Refill: 3  -     fluconazole (DIFLUCAN) 150 MG Tab; Take 1 tablet (150 mg total) by mouth once daily.  Dispense: 5 tablet; Refill: 2  -     hydrOXYchloroQUINE (PLAQUENIL) 200 mg tablet; Take 1 tablet (200 mg total) by mouth 2 (two) times daily.  Dispense: 180 tablet; Refill: 3  -     metoprolol tartrate (LOPRESSOR) 100 MG tablet; Take 1 tablet (100 mg total) by mouth 2 (two) times daily.  Dispense: 180 tablet; Refill: 3  -     valsartan (DIOVAN) 320 MG tablet; Take 1 tablet (320 mg total) by mouth once daily.  Dispense: 90 tablet; Refill: 3  -     methylPREDNISolone acetate (DEPO-MEDROL) 80 mg/mL injection; Inject 2 mLs (160 mg total) into the muscle every 30 days.  Dispense: 6 mL; Refill: 2  -     ketorolac (TORADOL) 60 mg/2 mL Soln; 2 ml sq monthly  Dispense: 2 mL; Refill: 6  -     CBC Auto Differential; Future; Expected date: 12/19/2022  -     Comprehensive Metabolic Panel; Future; Expected date: 12/19/2022  -     MARTI Screen w/Reflex; Future; Expected date: 12/19/2022  -     Sedimentation rate; Future; Expected date: 12/19/2022  -     Sjogrens syndrome-A extractable nuclear antibody; Future; Expected date: 12/19/2022  -     Sjogrens syndrome-B extractable nuclear antibody; Future; Expected date: 12/19/2022  -     C-Reactive Protein; Future; Expected date: 12/19/2022  -     Vitamin D; Future; Expected date: 12/19/2022  -     TSH; Future; Expected date: 12/19/2022  -     T4, Free; Future; Expected date: 12/19/2022  -     T3; Future; Expected date:  12/19/2022    Sjogren's syndrome, with unspecified organ involvement  -     Ambulatory referral/consult to Cardiology; Future; Expected date: 12/26/2022  -     ALPRAZolam (XANAX) 0.25 MG tablet; Take 1 tablet (0.25 mg total) by mouth 4 (four) times daily as needed for Anxiety.  Dispense: 28 tablet; Refill: 2  -     gabapentin (NEURONTIN) 100 MG capsule; TAKE 1 CAPSULE BY MOUTH 3  TIMES DAILY  Dispense: 270 capsule; Refill: 3  -     EScitalopram oxalate (LEXAPRO) 20 MG tablet; Take 1 tablet (20 mg total) by mouth once daily.  Dispense: 90 tablet; Refill: 3  -     hydroCHLOROthiazide (HYDRODIURIL) 25 MG tablet; Take 1 tablet (25 mg total) by mouth once daily.  Dispense: 90 tablet; Refill: 3  -     potassium chloride (KLOR-CON) 10 MEQ TbSR; Take 1 tablet (10 mEq total) by mouth once daily.  Dispense: 90 tablet; Refill: 3  -     fluconazole (DIFLUCAN) 150 MG Tab; Take 1 tablet (150 mg total) by mouth once daily.  Dispense: 5 tablet; Refill: 2  -     hydrOXYchloroQUINE (PLAQUENIL) 200 mg tablet; Take 1 tablet (200 mg total) by mouth 2 (two) times daily.  Dispense: 180 tablet; Refill: 3  -     metoprolol tartrate (LOPRESSOR) 100 MG tablet; Take 1 tablet (100 mg total) by mouth 2 (two) times daily.  Dispense: 180 tablet; Refill: 3  -     valsartan (DIOVAN) 320 MG tablet; Take 1 tablet (320 mg total) by mouth once daily.  Dispense: 90 tablet; Refill: 3  -     methylPREDNISolone acetate (DEPO-MEDROL) 80 mg/mL injection; Inject 2 mLs (160 mg total) into the muscle every 30 days.  Dispense: 6 mL; Refill: 2  -     ketorolac (TORADOL) 60 mg/2 mL Soln; 2 ml sq monthly  Dispense: 2 mL; Refill: 6  -     CBC Auto Differential; Future; Expected date: 12/19/2022  -     Comprehensive Metabolic Panel; Future; Expected date: 12/19/2022  -     MARTI Screen w/Reflex; Future; Expected date: 12/19/2022  -     Sedimentation rate; Future; Expected date: 12/19/2022  -     Sjogrens syndrome-A extractable nuclear antibody; Future; Expected date:  12/19/2022  -     Sjogrens syndrome-B extractable nuclear antibody; Future; Expected date: 12/19/2022  -     C-Reactive Protein; Future; Expected date: 12/19/2022  -     Vitamin D; Future; Expected date: 12/19/2022  -     TSH; Future; Expected date: 12/19/2022  -     T4, Free; Future; Expected date: 12/19/2022  -     T3; Future; Expected date: 12/19/2022    Essential hypertension  -     Ambulatory referral/consult to Cardiology; Future; Expected date: 12/26/2022  -     ALPRAZolam (XANAX) 0.25 MG tablet; Take 1 tablet (0.25 mg total) by mouth 4 (four) times daily as needed for Anxiety.  Dispense: 28 tablet; Refill: 2  -     gabapentin (NEURONTIN) 100 MG capsule; TAKE 1 CAPSULE BY MOUTH 3  TIMES DAILY  Dispense: 270 capsule; Refill: 3  -     EScitalopram oxalate (LEXAPRO) 20 MG tablet; Take 1 tablet (20 mg total) by mouth once daily.  Dispense: 90 tablet; Refill: 3  -     hydroCHLOROthiazide (HYDRODIURIL) 25 MG tablet; Take 1 tablet (25 mg total) by mouth once daily.  Dispense: 90 tablet; Refill: 3  -     potassium chloride (KLOR-CON) 10 MEQ TbSR; Take 1 tablet (10 mEq total) by mouth once daily.  Dispense: 90 tablet; Refill: 3  -     fluconazole (DIFLUCAN) 150 MG Tab; Take 1 tablet (150 mg total) by mouth once daily.  Dispense: 5 tablet; Refill: 2  -     hydrOXYchloroQUINE (PLAQUENIL) 200 mg tablet; Take 1 tablet (200 mg total) by mouth 2 (two) times daily.  Dispense: 180 tablet; Refill: 3  -     metoprolol tartrate (LOPRESSOR) 100 MG tablet; Take 1 tablet (100 mg total) by mouth 2 (two) times daily.  Dispense: 180 tablet; Refill: 3  -     valsartan (DIOVAN) 320 MG tablet; Take 1 tablet (320 mg total) by mouth once daily.  Dispense: 90 tablet; Refill: 3  -     CBC Auto Differential; Future; Expected date: 12/19/2022  -     Comprehensive Metabolic Panel; Future; Expected date: 12/19/2022  -     MARTI Screen w/Reflex; Future; Expected date: 12/19/2022  -     Sedimentation rate; Future; Expected date: 12/19/2022  -      Sjogrens syndrome-A extractable nuclear antibody; Future; Expected date: 12/19/2022  -     Sjogrens syndrome-B extractable nuclear antibody; Future; Expected date: 12/19/2022  -     C-Reactive Protein; Future; Expected date: 12/19/2022  -     Vitamin D; Future; Expected date: 12/19/2022  -     TSH; Future; Expected date: 12/19/2022  -     T4, Free; Future; Expected date: 12/19/2022  -     T3; Future; Expected date: 12/19/2022    Vertigo, labyrinthine, bilateral  -     Ambulatory referral/consult to ENT; Future; Expected date: 12/26/2022  -     meclizine (ANTIVERT) 25 mg tablet; Take 1 tablet (25 mg total) by mouth 3 (three) times daily as needed for Dizziness.  Dispense: 90 tablet; Refill: 3    Anxiety  -     ALPRAZolam (XANAX) 0.25 MG tablet; Take 1 tablet (0.25 mg total) by mouth 4 (four) times daily as needed for Anxiety.  Dispense: 28 tablet; Refill: 2  -     gabapentin (NEURONTIN) 100 MG capsule; TAKE 1 CAPSULE BY MOUTH 3  TIMES DAILY  Dispense: 270 capsule; Refill: 3  -     EScitalopram oxalate (LEXAPRO) 20 MG tablet; Take 1 tablet (20 mg total) by mouth once daily.  Dispense: 90 tablet; Refill: 3  -     hydroCHLOROthiazide (HYDRODIURIL) 25 MG tablet; Take 1 tablet (25 mg total) by mouth once daily.  Dispense: 90 tablet; Refill: 3  -     potassium chloride (KLOR-CON) 10 MEQ TbSR; Take 1 tablet (10 mEq total) by mouth once daily.  Dispense: 90 tablet; Refill: 3  -     fluconazole (DIFLUCAN) 150 MG Tab; Take 1 tablet (150 mg total) by mouth once daily.  Dispense: 5 tablet; Refill: 2  -     hydrOXYchloroQUINE (PLAQUENIL) 200 mg tablet; Take 1 tablet (200 mg total) by mouth 2 (two) times daily.  Dispense: 180 tablet; Refill: 3  -     metoprolol tartrate (LOPRESSOR) 100 MG tablet; Take 1 tablet (100 mg total) by mouth 2 (two) times daily.  Dispense: 180 tablet; Refill: 3  -     valsartan (DIOVAN) 320 MG tablet; Take 1 tablet (320 mg total) by mouth once daily.  Dispense: 90 tablet; Refill: 3    Fibromyalgia  -      ALPRAZolam (XANAX) 0.25 MG tablet; Take 1 tablet (0.25 mg total) by mouth 4 (four) times daily as needed for Anxiety.  Dispense: 28 tablet; Refill: 2  -     gabapentin (NEURONTIN) 100 MG capsule; TAKE 1 CAPSULE BY MOUTH 3  TIMES DAILY  Dispense: 270 capsule; Refill: 3  -     EScitalopram oxalate (LEXAPRO) 20 MG tablet; Take 1 tablet (20 mg total) by mouth once daily.  Dispense: 90 tablet; Refill: 3  -     hydroCHLOROthiazide (HYDRODIURIL) 25 MG tablet; Take 1 tablet (25 mg total) by mouth once daily.  Dispense: 90 tablet; Refill: 3  -     potassium chloride (KLOR-CON) 10 MEQ TbSR; Take 1 tablet (10 mEq total) by mouth once daily.  Dispense: 90 tablet; Refill: 3  -     fluconazole (DIFLUCAN) 150 MG Tab; Take 1 tablet (150 mg total) by mouth once daily.  Dispense: 5 tablet; Refill: 2  -     hydrOXYchloroQUINE (PLAQUENIL) 200 mg tablet; Take 1 tablet (200 mg total) by mouth 2 (two) times daily.  Dispense: 180 tablet; Refill: 3  -     metoprolol tartrate (LOPRESSOR) 100 MG tablet; Take 1 tablet (100 mg total) by mouth 2 (two) times daily.  Dispense: 180 tablet; Refill: 3  -     valsartan (DIOVAN) 320 MG tablet; Take 1 tablet (320 mg total) by mouth once daily.  Dispense: 90 tablet; Refill: 3  -     methylPREDNISolone acetate (DEPO-MEDROL) 80 mg/mL injection; Inject 2 mLs (160 mg total) into the muscle every 30 days.  Dispense: 6 mL; Refill: 2  -     ketorolac (TORADOL) 60 mg/2 mL Soln; 2 ml sq monthly  Dispense: 2 mL; Refill: 6  -     CBC Auto Differential; Future; Expected date: 12/19/2022  -     Comprehensive Metabolic Panel; Future; Expected date: 12/19/2022  -     MARTI Screen w/Reflex; Future; Expected date: 12/19/2022  -     Sedimentation rate; Future; Expected date: 12/19/2022  -     Sjogrens syndrome-A extractable nuclear antibody; Future; Expected date: 12/19/2022  -     Sjogrens syndrome-B extractable nuclear antibody; Future; Expected date: 12/19/2022  -     C-Reactive Protein; Future; Expected date:  12/19/2022  -     Vitamin D; Future; Expected date: 12/19/2022  -     TSH; Future; Expected date: 12/19/2022  -     T4, Free; Future; Expected date: 12/19/2022  -     T3; Future; Expected date: 12/19/2022    Lumbar and sacral osteoarthritis  -     ALPRAZolam (XANAX) 0.25 MG tablet; Take 1 tablet (0.25 mg total) by mouth 4 (four) times daily as needed for Anxiety.  Dispense: 28 tablet; Refill: 2  -     gabapentin (NEURONTIN) 100 MG capsule; TAKE 1 CAPSULE BY MOUTH 3  TIMES DAILY  Dispense: 270 capsule; Refill: 3  -     EScitalopram oxalate (LEXAPRO) 20 MG tablet; Take 1 tablet (20 mg total) by mouth once daily.  Dispense: 90 tablet; Refill: 3  -     hydroCHLOROthiazide (HYDRODIURIL) 25 MG tablet; Take 1 tablet (25 mg total) by mouth once daily.  Dispense: 90 tablet; Refill: 3  -     potassium chloride (KLOR-CON) 10 MEQ TbSR; Take 1 tablet (10 mEq total) by mouth once daily.  Dispense: 90 tablet; Refill: 3  -     fluconazole (DIFLUCAN) 150 MG Tab; Take 1 tablet (150 mg total) by mouth once daily.  Dispense: 5 tablet; Refill: 2  -     hydrOXYchloroQUINE (PLAQUENIL) 200 mg tablet; Take 1 tablet (200 mg total) by mouth 2 (two) times daily.  Dispense: 180 tablet; Refill: 3  -     metoprolol tartrate (LOPRESSOR) 100 MG tablet; Take 1 tablet (100 mg total) by mouth 2 (two) times daily.  Dispense: 180 tablet; Refill: 3  -     valsartan (DIOVAN) 320 MG tablet; Take 1 tablet (320 mg total) by mouth once daily.  Dispense: 90 tablet; Refill: 3  -     methylPREDNISolone acetate (DEPO-MEDROL) 80 mg/mL injection; Inject 2 mLs (160 mg total) into the muscle every 30 days.  Dispense: 6 mL; Refill: 2  -     ketorolac (TORADOL) 60 mg/2 mL Soln; 2 ml sq monthly  Dispense: 2 mL; Refill: 6  -     CBC Auto Differential; Future; Expected date: 12/19/2022  -     Comprehensive Metabolic Panel; Future; Expected date: 12/19/2022  -     MARTI Screen w/Reflex; Future; Expected date: 12/19/2022  -     Sedimentation rate; Future; Expected date:  12/19/2022  -     Sjogrens syndrome-A extractable nuclear antibody; Future; Expected date: 12/19/2022  -     Sjogrens syndrome-B extractable nuclear antibody; Future; Expected date: 12/19/2022  -     C-Reactive Protein; Future; Expected date: 12/19/2022  -     Vitamin D; Future; Expected date: 12/19/2022  -     TSH; Future; Expected date: 12/19/2022  -     T4, Free; Future; Expected date: 12/19/2022  -     T3; Future; Expected date: 12/19/2022    Sinus congestion  -     ALPRAZolam (XANAX) 0.25 MG tablet; Take 1 tablet (0.25 mg total) by mouth 4 (four) times daily as needed for Anxiety.  Dispense: 28 tablet; Refill: 2  -     gabapentin (NEURONTIN) 100 MG capsule; TAKE 1 CAPSULE BY MOUTH 3  TIMES DAILY  Dispense: 270 capsule; Refill: 3  -     EScitalopram oxalate (LEXAPRO) 20 MG tablet; Take 1 tablet (20 mg total) by mouth once daily.  Dispense: 90 tablet; Refill: 3  -     hydroCHLOROthiazide (HYDRODIURIL) 25 MG tablet; Take 1 tablet (25 mg total) by mouth once daily.  Dispense: 90 tablet; Refill: 3  -     potassium chloride (KLOR-CON) 10 MEQ TbSR; Take 1 tablet (10 mEq total) by mouth once daily.  Dispense: 90 tablet; Refill: 3  -     fluconazole (DIFLUCAN) 150 MG Tab; Take 1 tablet (150 mg total) by mouth once daily.  Dispense: 5 tablet; Refill: 2  -     hydrOXYchloroQUINE (PLAQUENIL) 200 mg tablet; Take 1 tablet (200 mg total) by mouth 2 (two) times daily.  Dispense: 180 tablet; Refill: 3  -     metoprolol tartrate (LOPRESSOR) 100 MG tablet; Take 1 tablet (100 mg total) by mouth 2 (two) times daily.  Dispense: 180 tablet; Refill: 3  -     valsartan (DIOVAN) 320 MG tablet; Take 1 tablet (320 mg total) by mouth once daily.  Dispense: 90 tablet; Refill: 3  -     methylPREDNISolone acetate (DEPO-MEDROL) 80 mg/mL injection; Inject 2 mLs (160 mg total) into the muscle every 30 days.  Dispense: 6 mL; Refill: 2  -     ketorolac (TORADOL) 60 mg/2 mL Soln; 2 ml sq monthly  Dispense: 2 mL; Refill: 6    Dizziness  -      12/19/2022  -     Vitamin D; Future; Expected date: 12/19/2022  -     TSH; Future; Expected date: 12/19/2022  -     T4, Free; Future; Expected date: 12/19/2022  -     T3; Future; Expected date: 12/19/2022    Chronic pain syndrome  -     ALPRAZolam (XANAX) 0.25 MG tablet; Take 1 tablet (0.25 mg total) by mouth 4 (four) times daily as needed for Anxiety.  Dispense: 28 tablet; Refill: 2  -     gabapentin (NEURONTIN) 100 MG capsule; TAKE 1 CAPSULE BY MOUTH 3  TIMES DAILY  Dispense: 270 capsule; Refill: 3  -     EScitalopram oxalate (LEXAPRO) 20 MG tablet; Take 1 tablet (20 mg total) by mouth once daily.  Dispense: 90 tablet; Refill: 3  -     hydroCHLOROthiazide (HYDRODIURIL) 25 MG tablet; Take 1 tablet (25 mg total) by mouth once daily.  Dispense: 90 tablet; Refill: 3  -     potassium chloride (KLOR-CON) 10 MEQ TbSR; Take 1 tablet (10 mEq total) by mouth once daily.  Dispense: 90 tablet; Refill: 3  -     fluconazole (DIFLUCAN) 150 MG Tab; Take 1 tablet (150 mg total) by mouth once daily.  Dispense: 5 tablet; Refill: 2  -     hydrOXYchloroQUINE (PLAQUENIL) 200 mg tablet; Take 1 tablet (200 mg total) by mouth 2 (two) times daily.  Dispense: 180 tablet; Refill: 3  -     metoprolol tartrate (LOPRESSOR) 100 MG tablet; Take 1 tablet (100 mg total) by mouth 2 (two) times daily.  Dispense: 180 tablet; Refill: 3  -     valsartan (DIOVAN) 320 MG tablet; Take 1 tablet (320 mg total) by mouth once daily.  Dispense: 90 tablet; Refill: 3  -     CBC Auto Differential; Future; Expected date: 12/19/2022  -     Comprehensive Metabolic Panel; Future; Expected date: 12/19/2022  -     MARTI Screen w/Reflex; Future; Expected date: 12/19/2022  -     Sedimentation rate; Future; Expected date: 12/19/2022  -     Sjogrens syndrome-A extractable nuclear antibody; Future; Expected date: 12/19/2022  -     Sjogrens syndrome-B extractable nuclear antibody; Future; Expected date: 12/19/2022  -     C-Reactive Protein; Future; Expected date: 12/19/2022  -      Vitamin D; Future; Expected date: 12/19/2022  -     TSH; Future; Expected date: 12/19/2022  -     T4, Free; Future; Expected date: 12/19/2022  -     T3; Future; Expected date: 12/19/2022    Mixed connective tissue disease  -     ALPRAZolam (XANAX) 0.25 MG tablet; Take 1 tablet (0.25 mg total) by mouth 4 (four) times daily as needed for Anxiety.  Dispense: 28 tablet; Refill: 2  -     gabapentin (NEURONTIN) 100 MG capsule; TAKE 1 CAPSULE BY MOUTH 3  TIMES DAILY  Dispense: 270 capsule; Refill: 3  -     EScitalopram oxalate (LEXAPRO) 20 MG tablet; Take 1 tablet (20 mg total) by mouth once daily.  Dispense: 90 tablet; Refill: 3  -     hydroCHLOROthiazide (HYDRODIURIL) 25 MG tablet; Take 1 tablet (25 mg total) by mouth once daily.  Dispense: 90 tablet; Refill: 3  -     potassium chloride (KLOR-CON) 10 MEQ TbSR; Take 1 tablet (10 mEq total) by mouth once daily.  Dispense: 90 tablet; Refill: 3  -     fluconazole (DIFLUCAN) 150 MG Tab; Take 1 tablet (150 mg total) by mouth once daily.  Dispense: 5 tablet; Refill: 2  -     hydrOXYchloroQUINE (PLAQUENIL) 200 mg tablet; Take 1 tablet (200 mg total) by mouth 2 (two) times daily.  Dispense: 180 tablet; Refill: 3  -     metoprolol tartrate (LOPRESSOR) 100 MG tablet; Take 1 tablet (100 mg total) by mouth 2 (two) times daily.  Dispense: 180 tablet; Refill: 3  -     valsartan (DIOVAN) 320 MG tablet; Take 1 tablet (320 mg total) by mouth once daily.  Dispense: 90 tablet; Refill: 3  -     CBC Auto Differential; Future; Expected date: 12/19/2022  -     Comprehensive Metabolic Panel; Future; Expected date: 12/19/2022  -     MARTI Screen w/Reflex; Future; Expected date: 12/19/2022  -     Sedimentation rate; Future; Expected date: 12/19/2022  -     Sjogrens syndrome-A extractable nuclear antibody; Future; Expected date: 12/19/2022  -     Sjogrens syndrome-B extractable nuclear antibody; Future; Expected date: 12/19/2022  -     C-Reactive Protein; Future; Expected date: 12/19/2022  -      Vitamin D; Future; Expected date: 12/19/2022  -     TSH; Future; Expected date: 12/19/2022  -     T4, Free; Future; Expected date: 12/19/2022  -     T3; Future; Expected date: 12/19/2022    Candida albicans infection  -     methylPREDNISolone acetate (DEPO-MEDROL) 80 mg/mL injection; Inject 2 mLs (160 mg total) into the muscle every 30 days.  Dispense: 6 mL; Refill: 2        More than 50% of the  40 minute encounter was spent face to face counseling the patient regarding current status and future plan of care as well as side of the medications. All questions were answered to patient's satisfaction    Assessment:  64 year old female with  SLE, Sjogren's syndrome on plaquenil  --fibromyalgia  --lumbar and sacral arthritis  --chronic pain syndrome on tylenol 3 sparingly    Plan:  1.  Cont. Gabapentin 100 mg qhs. Only using zanaflex when doing  poorly  2. Cont. Plaquenil 200 mg bid  3. Cont. Toradol/depo every 6-8 wks prn  5. Medications refilled  6.  voltaren gel pt will get OTC  7. Referral to ent for vertigo and cardiology to f/u on MVR, TR       More than 50% of the  40 minute encounter was spent face to face counseling the patient regarding current status and future plan of care as well as side of the medications. All questions were answered to patient's satisfaction

## 2023-01-10 DIAGNOSIS — M79.7 FIBROMYALGIA: ICD-10-CM

## 2023-01-10 DIAGNOSIS — R09.81 SINUS CONGESTION: ICD-10-CM

## 2023-01-10 DIAGNOSIS — F41.9 ANXIETY: ICD-10-CM

## 2023-01-10 DIAGNOSIS — M35.00 SJOGREN'S SYNDROME, WITH UNSPECIFIED ORGAN INVOLVEMENT: ICD-10-CM

## 2023-01-10 DIAGNOSIS — M35.1 MIXED CONNECTIVE TISSUE DISEASE: ICD-10-CM

## 2023-01-10 DIAGNOSIS — R42 DIZZINESS: ICD-10-CM

## 2023-01-10 DIAGNOSIS — G89.4 CHRONIC PAIN SYNDROME: ICD-10-CM

## 2023-01-10 DIAGNOSIS — I10 ESSENTIAL HYPERTENSION: ICD-10-CM

## 2023-01-10 DIAGNOSIS — M32.9 SYSTEMIC LUPUS ERYTHEMATOSUS, UNSPECIFIED SLE TYPE, UNSPECIFIED ORGAN INVOLVEMENT STATUS: ICD-10-CM

## 2023-01-10 DIAGNOSIS — M47.817 LUMBAR AND SACRAL OSTEOARTHRITIS: ICD-10-CM

## 2023-01-19 RX ORDER — VALSARTAN 320 MG/1
320 TABLET ORAL DAILY
Qty: 90 TABLET | Refills: 3 | OUTPATIENT
Start: 2023-01-19

## 2023-01-19 RX ORDER — VALSARTAN 320 MG/1
320 TABLET ORAL DAILY
Qty: 90 TABLET | Refills: 3 | Status: SHIPPED | OUTPATIENT
Start: 2023-01-19 | End: 2023-05-24 | Stop reason: SDUPTHER

## 2023-02-03 ENCOUNTER — OFFICE (OUTPATIENT)
Dept: URBAN - METROPOLITAN AREA PATHOLOGY 18 | Facility: PATHOLOGY | Age: 68
End: 2023-02-03
Payer: COMMERCIAL

## 2023-02-03 ENCOUNTER — OFFICE (OUTPATIENT)
Dept: URBAN - METROPOLITAN AREA CLINIC 98 | Facility: CLINIC | Age: 68
End: 2023-02-03
Payer: COMMERCIAL

## 2023-02-03 VITALS
SYSTOLIC BLOOD PRESSURE: 126 MMHG | HEART RATE: 84 BPM | TEMPERATURE: 97.5 F | HEART RATE: 70 BPM | OXYGEN SATURATION: 100 % | SYSTOLIC BLOOD PRESSURE: 159 MMHG | RESPIRATION RATE: 12 BRPM | WEIGHT: 220 LBS | DIASTOLIC BLOOD PRESSURE: 73 MMHG | DIASTOLIC BLOOD PRESSURE: 65 MMHG | OXYGEN SATURATION: 98 % | SYSTOLIC BLOOD PRESSURE: 147 MMHG | HEART RATE: 83 BPM | HEART RATE: 74 BPM | SYSTOLIC BLOOD PRESSURE: 144 MMHG | RESPIRATION RATE: 16 BRPM | TEMPERATURE: 97.3 F | HEART RATE: 69 BPM | RESPIRATION RATE: 13 BRPM | DIASTOLIC BLOOD PRESSURE: 72 MMHG | SYSTOLIC BLOOD PRESSURE: 124 MMHG | OXYGEN SATURATION: 94 % | OXYGEN SATURATION: 96 % | RESPIRATION RATE: 19 BRPM | DIASTOLIC BLOOD PRESSURE: 75 MMHG | HEIGHT: 65 IN

## 2023-02-03 DIAGNOSIS — K21.9 GASTRO-ESOPHAGEAL REFLUX DISEASE WITHOUT ESOPHAGITIS: ICD-10-CM

## 2023-02-03 DIAGNOSIS — K31.A0 GASTRIC INTESTINAL METAPLASIA, UNSPECIFIED: ICD-10-CM

## 2023-02-03 DIAGNOSIS — K22.70 BARRETT'S ESOPHAGUS WITHOUT DYSPLASIA: ICD-10-CM

## 2023-02-03 DIAGNOSIS — K31.7 POLYP OF STOMACH AND DUODENUM: ICD-10-CM

## 2023-02-03 DIAGNOSIS — K22.89 OTHER SPECIFIED DISEASE OF ESOPHAGUS: ICD-10-CM

## 2023-02-03 PROCEDURE — 88313 SPECIAL STAINS GROUP 2: CPT | Performed by: PATHOLOGY

## 2023-02-03 PROCEDURE — 88312 SPECIAL STAINS GROUP 1: CPT | Performed by: PATHOLOGY

## 2023-02-03 PROCEDURE — 88342 IMHCHEM/IMCYTCHM 1ST ANTB: CPT | Performed by: PATHOLOGY

## 2023-02-03 PROCEDURE — 88305 TISSUE EXAM BY PATHOLOGIST: CPT | Performed by: PATHOLOGY

## 2023-03-16 ENCOUNTER — OFFICE (OUTPATIENT)
Dept: URBAN - METROPOLITAN AREA CLINIC 34 | Facility: CLINIC | Age: 68
End: 2023-03-16
Payer: COMMERCIAL

## 2023-03-16 VITALS
TEMPERATURE: 97.3 F | SYSTOLIC BLOOD PRESSURE: 142 MMHG | WEIGHT: 216 LBS | DIASTOLIC BLOOD PRESSURE: 94 MMHG | HEART RATE: 69 BPM | HEIGHT: 65 IN

## 2023-03-16 DIAGNOSIS — R10.30 LOWER ABDOMINAL PAIN, UNSPECIFIED: ICD-10-CM

## 2023-03-16 PROCEDURE — 99215 OFFICE O/P EST HI 40 MIN: CPT | Performed by: NURSE PRACTITIONER

## 2023-03-16 RX ORDER — HYDROCODONE BITARTRATE AND ACETAMINOPHEN 5; 325 MG/1; MG/1
TABLET ORAL
Qty: 20 | Refills: 0 | Status: ACTIVE
Start: 2023-03-16

## 2023-03-16 NOTE — SERVICEHPINOTES
Mrs. Nelson ia a 67-year-old female with past medical history for fibromyalgia, Gerd, hyperlipidemia, hysterectomy, appendectomy, right oophorectomy, bladder sling, ALBINO and arthritis who presents the clinic today to discuss lower abdominal pain. Her lower abdominal pain started 3 weeks ago. She was diagnosed with a UTI at that time and was reportedly treated with a few different antibiotics (keflex, cipro and rocephin) due to bacteria resistance. Unfortunately despite this treatment her abdominal pain has continued. She presented to [facility] and had lab work (CBC, BMP, LFTS, lipase were normal) and a CT scan which is reportedly normal. She was seen by a surgeon, Dr. Chowdhury who found NO findings to explain the pain and that there was no need for any surgery. 3 days later she went to [facility] where she had lab work which is reportedly normal. She was seen by Dr. Shepard, GYN, who found no acute gynecological issues (prior right oophorectomy and hysterectomy). SHe apparently had a vaginal US.
br A SBFT has been ordered but not yet complete. brShe has tried tramadol and tylenol for the pain but these have not helped. brThe lower abdominal pain is always there, increases with movement. The pain is described as burning. brHer bowel movements are daily, BSS 4. 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
br
She had an endoscopy on February 3, 2023 which showed a normal duodenum, polyp in the stomach body s/p polypectomy, polyp in the cardia s/p  polypectomy otherwise the remainder of the stomach was normal. They were irregular margins at the GE junction. Otherwise the remainder of the esophagus was normal. Repeat EGD in five years. Laura last colonoscopy was done in September 2019 which showed internal hemorrhoids. Otherwise the rest of the colon was normal. Recall 5 years. dao dc

## 2023-03-18 LAB
CULTURE, URINE, ROUTINE: (no result)
URINALYSIS, COMPLETE: AMORPHOUS SEDIMENT: NORMAL /HPF
URINALYSIS, COMPLETE: APPEARANCE: CLEAR
URINALYSIS, COMPLETE: BACTERIA: NORMAL /HPF
URINALYSIS, COMPLETE: BILIRUBIN: NEGATIVE
URINALYSIS, COMPLETE: CALCIUM OXALATE CRYSTALS: NORMAL /HPF
URINALYSIS, COMPLETE: CASTS: NORMAL /LPF
URINALYSIS, COMPLETE: COLOR: YELLOW
URINALYSIS, COMPLETE: COMMENTS: NORMAL
URINALYSIS, COMPLETE: CRYSTALS: NORMAL /HPF
URINALYSIS, COMPLETE: GLUCOSE: NEGATIVE
URINALYSIS, COMPLETE: GRANULAR CAST: NORMAL /LPF
URINALYSIS, COMPLETE: HYALINE CAST: NORMAL /LPF
URINALYSIS, COMPLETE: KETONES: NEGATIVE
URINALYSIS, COMPLETE: LEUKOCYTE ESTERASE: NEGATIVE
URINALYSIS, COMPLETE: NITRITE: NEGATIVE
URINALYSIS, COMPLETE: NOTE: (no result)
URINALYSIS, COMPLETE: OCCULT BLOOD: NEGATIVE
URINALYSIS, COMPLETE: PH: 7 (ref 5–8)
URINALYSIS, COMPLETE: PROTEIN: NEGATIVE
URINALYSIS, COMPLETE: RBC: NORMAL /HPF
URINALYSIS, COMPLETE: RENAL EPITHELIAL CELLS: NORMAL /HPF
URINALYSIS, COMPLETE: SPECIFIC GRAVITY: 1.01 (ref 1–1.03)
URINALYSIS, COMPLETE: SQUAMOUS EPITHELIAL CELLS: NORMAL /HPF
URINALYSIS, COMPLETE: TRANSITIONAL EPITHELIAL CELLS: NORMAL /HPF
URINALYSIS, COMPLETE: TRIPLE PHOSPHATE CRYSTALS: NORMAL /HPF
URINALYSIS, COMPLETE: URIC ACID CRYSTALS: NORMAL /HPF
URINALYSIS, COMPLETE: WBC: NORMAL /HPF
URINALYSIS, COMPLETE: YEAST: NORMAL /HPF

## 2023-03-30 PROBLEM — R10.13 ABDOMINAL PAIN, EPIGASTRIC: Status: ACTIVE | Noted: 2020-12-23

## 2023-05-05 ENCOUNTER — PATIENT MESSAGE (OUTPATIENT)
Dept: RHEUMATOLOGY | Facility: CLINIC | Age: 68
End: 2023-05-05
Payer: MEDICARE

## 2023-05-05 ENCOUNTER — TELEPHONE (OUTPATIENT)
Dept: RHEUMATOLOGY | Facility: CLINIC | Age: 68
End: 2023-05-05
Payer: MEDICARE

## 2023-05-05 NOTE — TELEPHONE ENCOUNTER
----- Message from Guillermina Jennings sent at 5/5/2023 11:59 AM CDT -----  Type: Needs Medical Advice  Who Called:  Pt  Best Call Back Number: [phone]  Additional Information: Pt has an appt on 5/24 but states she is in serious pain for over a week now, would like to be seen sooner, pl call bk to advise Thanks

## 2023-05-08 ENCOUNTER — TELEPHONE (OUTPATIENT)
Dept: RHEUMATOLOGY | Facility: CLINIC | Age: 68
End: 2023-05-08
Payer: MEDICARE

## 2023-05-08 DIAGNOSIS — R52 BURNING PAIN: ICD-10-CM

## 2023-05-08 DIAGNOSIS — M47.817 LUMBAR AND SACRAL OSTEOARTHRITIS: Primary | ICD-10-CM

## 2023-05-08 NOTE — TELEPHONE ENCOUNTER
Returned patient call. Patient stated symptoms began about a week ago. Stated stopped gabapentin back in February due to falling. Patient stated medication is not helping. She is having burning pain in back and pain down legs. Will fax order for a back xray to [facility] in [place].

## 2023-05-08 NOTE — TELEPHONE ENCOUNTER
----- Message from Honey Rosario sent at 5/8/2023  9:17 AM CDT -----  Contact: self  Type:  Needs Medical Advice    Who Called: self    Would the patient rather a call back or a response via [facility]? call  Best Call Back Number: [phone] (home)     Additional Information: pt would like to speak to nurse on the phone and not through the portal. Please advise and thank you

## 2023-05-09 ENCOUNTER — PATIENT MESSAGE (OUTPATIENT)
Dept: RHEUMATOLOGY | Facility: CLINIC | Age: 68
End: 2023-05-09
Payer: MEDICARE

## 2023-05-10 ENCOUNTER — PATIENT MESSAGE (OUTPATIENT)
Dept: RHEUMATOLOGY | Facility: CLINIC | Age: 68
End: 2023-05-10
Payer: MEDICARE

## 2023-05-12 ENCOUNTER — PATIENT MESSAGE (OUTPATIENT)
Dept: RHEUMATOLOGY | Facility: CLINIC | Age: 68
End: 2023-05-12
Payer: MEDICARE

## 2023-05-12 DIAGNOSIS — M47.817 LUMBAR AND SACRAL OSTEOARTHRITIS: Primary | ICD-10-CM

## 2023-05-24 ENCOUNTER — OFFICE (OUTPATIENT)
Dept: URBAN - METROPOLITAN AREA PATHOLOGY 18 | Facility: PATHOLOGY | Age: 68
End: 2023-05-24
Payer: COMMERCIAL

## 2023-05-24 ENCOUNTER — OFFICE VISIT (OUTPATIENT)
Dept: RHEUMATOLOGY | Facility: CLINIC | Age: 68
End: 2023-05-24
Payer: MEDICARE

## 2023-05-24 ENCOUNTER — OFFICE (OUTPATIENT)
Dept: URBAN - METROPOLITAN AREA CLINIC 98 | Facility: CLINIC | Age: 68
End: 2023-05-24
Payer: COMMERCIAL

## 2023-05-24 VITALS
OXYGEN SATURATION: 94 % | DIASTOLIC BLOOD PRESSURE: 83 MMHG | DIASTOLIC BLOOD PRESSURE: 74 MMHG | SYSTOLIC BLOOD PRESSURE: 117 MMHG | HEART RATE: 54 BPM | TEMPERATURE: 97.9 F | SYSTOLIC BLOOD PRESSURE: 148 MMHG | HEART RATE: 58 BPM | SYSTOLIC BLOOD PRESSURE: 142 MMHG | RESPIRATION RATE: 25 BRPM | HEIGHT: 65 IN | HEART RATE: 74 BPM | DIASTOLIC BLOOD PRESSURE: 65 MMHG | SYSTOLIC BLOOD PRESSURE: 133 MMHG | RESPIRATION RATE: 24 BRPM | DIASTOLIC BLOOD PRESSURE: 77 MMHG | HEART RATE: 50 BPM | SYSTOLIC BLOOD PRESSURE: 129 MMHG | RESPIRATION RATE: 19 BRPM | SYSTOLIC BLOOD PRESSURE: 138 MMHG | WEIGHT: 216 LBS | HEART RATE: 56 BPM | RESPIRATION RATE: 16 BRPM | DIASTOLIC BLOOD PRESSURE: 84 MMHG | RESPIRATION RATE: 20 BRPM | TEMPERATURE: 97 F | HEART RATE: 68 BPM | OXYGEN SATURATION: 98 % | SYSTOLIC BLOOD PRESSURE: 147 MMHG | HEART RATE: 55 BPM | OXYGEN SATURATION: 96 % | DIASTOLIC BLOOD PRESSURE: 76 MMHG | DIASTOLIC BLOOD PRESSURE: 78 MMHG

## 2023-05-24 DIAGNOSIS — D12.5 BENIGN NEOPLASM OF SIGMOID COLON: ICD-10-CM

## 2023-05-24 DIAGNOSIS — M32.9 SLE (SYSTEMIC LUPUS ERYTHEMATOSUS RELATED SYNDROME): ICD-10-CM

## 2023-05-24 DIAGNOSIS — G89.4 CHRONIC PAIN SYNDROME: ICD-10-CM

## 2023-05-24 DIAGNOSIS — M32.9 SYSTEMIC LUPUS ERYTHEMATOSUS, UNSPECIFIED SLE TYPE, UNSPECIFIED ORGAN INVOLVEMENT STATUS: ICD-10-CM

## 2023-05-24 DIAGNOSIS — B37.9 CANDIDA ALBICANS INFECTION: ICD-10-CM

## 2023-05-24 DIAGNOSIS — M47.817 LUMBAR AND SACRAL OSTEOARTHRITIS: ICD-10-CM

## 2023-05-24 DIAGNOSIS — R42 DIZZINESS: ICD-10-CM

## 2023-05-24 DIAGNOSIS — M35.00 SJOGREN'S SYNDROME, WITH UNSPECIFIED ORGAN INVOLVEMENT: ICD-10-CM

## 2023-05-24 DIAGNOSIS — M35.1 MIXED CONNECTIVE TISSUE DISEASE: ICD-10-CM

## 2023-05-24 DIAGNOSIS — R10.9 UNSPECIFIED ABDOMINAL PAIN: ICD-10-CM

## 2023-05-24 DIAGNOSIS — M54.16 LUMBAR RADICULOPATHY, CHRONIC: ICD-10-CM

## 2023-05-24 DIAGNOSIS — I10 ESSENTIAL HYPERTENSION: ICD-10-CM

## 2023-05-24 DIAGNOSIS — R09.81 SINUS CONGESTION: ICD-10-CM

## 2023-05-24 DIAGNOSIS — Z86.010 PERSONAL HISTORY OF COLONIC POLYPS: ICD-10-CM

## 2023-05-24 DIAGNOSIS — M32.9 LUPUS: Primary | ICD-10-CM

## 2023-05-24 DIAGNOSIS — D12.3 BENIGN NEOPLASM OF TRANSVERSE COLON: ICD-10-CM

## 2023-05-24 DIAGNOSIS — F41.9 ANXIETY: ICD-10-CM

## 2023-05-24 DIAGNOSIS — M54.9 DORSALGIA, UNSPECIFIED: ICD-10-CM

## 2023-05-24 DIAGNOSIS — M79.7 FIBROMYALGIA: ICD-10-CM

## 2023-05-24 PROBLEM — K63.5 POLYP OF COLON: Status: ACTIVE | Noted: 2023-05-24

## 2023-05-24 PROCEDURE — 99215 PR OFFICE/OUTPT VISIT, EST, LEVL V, 40-54 MIN: ICD-10-PCS | Mod: 95,,, | Performed by: INTERNAL MEDICINE

## 2023-05-24 PROCEDURE — 1159F MED LIST DOCD IN RCRD: CPT | Mod: CPTII,95,, | Performed by: INTERNAL MEDICINE

## 2023-05-24 PROCEDURE — 88305 TISSUE EXAM BY PATHOLOGIST: CPT | Performed by: PATHOLOGY

## 2023-05-24 PROCEDURE — 99215 OFFICE O/P EST HI 40 MIN: CPT | Mod: 95,,, | Performed by: INTERNAL MEDICINE

## 2023-05-24 PROCEDURE — 4010F PR ACE/ARB THEARPY RXD/TAKEN: ICD-10-PCS | Mod: CPTII,95,, | Performed by: INTERNAL MEDICINE

## 2023-05-24 PROCEDURE — 4010F ACE/ARB THERAPY RXD/TAKEN: CPT | Mod: CPTII,95,, | Performed by: INTERNAL MEDICINE

## 2023-05-24 PROCEDURE — 1160F RVW MEDS BY RX/DR IN RCRD: CPT | Mod: CPTII,95,, | Performed by: INTERNAL MEDICINE

## 2023-05-24 PROCEDURE — 1159F PR MEDICATION LIST DOCUMENTED IN MEDICAL RECORD: ICD-10-PCS | Mod: CPTII,95,, | Performed by: INTERNAL MEDICINE

## 2023-05-24 PROCEDURE — 1160F PR REVIEW ALL MEDS BY PRESCRIBER/CLIN PHARMACIST DOCUMENTED: ICD-10-PCS | Mod: CPTII,95,, | Performed by: INTERNAL MEDICINE

## 2023-05-24 RX ORDER — PREGABALIN 25 MG/1
25 CAPSULE ORAL NIGHTLY
Qty: 30 CAPSULE | Refills: 3 | Status: SHIPPED | OUTPATIENT
Start: 2023-05-24 | End: 2023-11-22

## 2023-05-24 NOTE — PROGRESS NOTES
Subjective:       Patient ID: Angelica Inman is a 67 y.o. female.    Chief Complaint: No chief complaint on file.    Follow up: 65 year old female w SLE and Sjogren's syndrome.she  is having lower back pain x 3 weeks, she stopped taking the gabapentin.She was told by ENT it was causing dizziness.  She complains of joint pain involving her wrists, ankles, hips, and low back. Low back pain is constant, aching, with radiation down the L leg.  Her insomnia is severe due to anxiety.  She is using topical salon pas for her lumbar spine.     Other sx include malar rash, hair loss, oral/nasal ulcers, dry eyes, dry mouth, and fatigue.     Current tx:  1. Plaquenil 200 mg bid    Review of Systems   Constitutional:  Positive for activity change and fatigue. Negative for appetite change and unexpected weight change.   HENT:  Negative for mouth sores.         Oral/nasal ulcers   Eyes:  Negative for redness and visual disturbance.        Dry eyes   Respiratory:  Negative for cough and shortness of breath.    Cardiovascular:  Negative for chest pain, palpitations and leg swelling.   Gastrointestinal:  Negative for constipation and diarrhea.   Genitourinary:  Negative for genital sores.   Musculoskeletal:  Positive for back pain and neck pain.   Skin:  Negative for rash.        Hair loss   Allergic/Immunologic: Negative for immunocompromised state.   Neurological:  Negative for dizziness and light-headedness.   Hematological:  Bruises/bleeds easily.   Psychiatric/Behavioral:  Negative for dysphoric mood. The patient is not nervous/anxious.        Objective:     There were no vitals filed for this visit.      Past Medical History:   Diagnosis Date    Acid reflux     Anxiety     Arthritis     Degenerative disc disease     Depression     Dry eyes     Dry mouth     Hypertension      Past Surgical History:   Procedure Laterality Date    APPENDECTOMY      HYSTERECTOMY      LIPOMA RESECTION      c spine area    THORACIC DISCECTOMY       disc rupture    TONSILLECTOMY            Physical Exam   Constitutional: She is oriented to person, place, and time.   Neurological: She is alert and oriented to person, place, and time.   Psychiatric: Mood, affect and judgment normal.        Labs done 5/16/23  crp 7.1    Assessment:       1. Lupus    2. Fibromyalgia    3. Lumbar and sacral osteoarthritis    4. SLE (systemic lupus erythematosus related syndrome)    5. Sjogren's syndrome, with unspecified organ involvement    6. Dorsalgia, unspecified    7. Lumbar radiculopathy, chronic    8. Systemic lupus erythematosus, unspecified SLE type, unspecified organ involvement status    9. Essential hypertension    10. Anxiety    11. Sinus congestion    12. Dizziness    13. Chronic pain syndrome    14. Mixed connective tissue disease    15. Candida albicans infection                Plan:       Lupus  -     pregabalin (LYRICA) 25 MG capsule; Take 1 capsule (25 mg total) by mouth every evening.  Dispense: 30 capsule; Refill: 5  -     Ambulatory referral/consult to Neurosurgery; Future; Expected date: 05/31/2023  -     MRI Lumbar Spine Without Contrast; Future; Expected date: 05/28/2023  -     ALPRAZolam (XANAX) 0.25 MG tablet; Take 1 tablet (0.25 mg total) by mouth 4 (four) times daily as needed for Anxiety.  Dispense: 28 tablet; Refill: 2  -     EScitalopram oxalate (LEXAPRO) 20 MG tablet; Take 1 tablet (20 mg total) by mouth once daily.  Dispense: 90 tablet; Refill: 3  -     hydrOXYchloroQUINE (PLAQUENIL) 200 mg tablet; Take 1 tablet (200 mg total) by mouth 2 (two) times daily.  Dispense: 180 tablet; Refill: 3  -     ketorolac (TORADOL) 60 mg/2 mL Soln; 2 ml sq monthly  Dispense: 2 mL; Refill: 6  -     methylPREDNISolone acetate (DEPO-MEDROL) 80 mg/mL injection; Inject 2 mLs (160 mg total) into the muscle every 30 days.  Dispense: 6 mL; Refill: 2  -     potassium chloride (KLOR-CON) 10 MEQ TbSR; Take 1 tablet (10 mEq total) by mouth once daily.  Dispense: 90 tablet;  Refill: 3  -     valsartan (DIOVAN) 320 MG tablet; Take 1 tablet (320 mg total) by mouth once daily.  Dispense: 90 tablet; Refill: 3  -     Sedimentation rate; Future; Expected date: 05/28/2023  -     C-Reactive Protein; Future; Expected date: 05/28/2023  -     Comprehensive Metabolic Panel; Future; Expected date: 05/28/2023  -     CBC Auto Differential; Future; Expected date: 05/28/2023    Fibromyalgia  -     pregabalin (LYRICA) 25 MG capsule; Take 1 capsule (25 mg total) by mouth every evening.  Dispense: 30 capsule; Refill: 5  -     Ambulatory referral/consult to Neurosurgery; Future; Expected date: 05/31/2023  -     MRI Lumbar Spine Without Contrast; Future; Expected date: 05/28/2023  -     ALPRAZolam (XANAX) 0.25 MG tablet; Take 1 tablet (0.25 mg total) by mouth 4 (four) times daily as needed for Anxiety.  Dispense: 28 tablet; Refill: 2  -     EScitalopram oxalate (LEXAPRO) 20 MG tablet; Take 1 tablet (20 mg total) by mouth once daily.  Dispense: 90 tablet; Refill: 3  -     hydrOXYchloroQUINE (PLAQUENIL) 200 mg tablet; Take 1 tablet (200 mg total) by mouth 2 (two) times daily.  Dispense: 180 tablet; Refill: 3  -     ketorolac (TORADOL) 60 mg/2 mL Soln; 2 ml sq monthly  Dispense: 2 mL; Refill: 6  -     methylPREDNISolone acetate (DEPO-MEDROL) 80 mg/mL injection; Inject 2 mLs (160 mg total) into the muscle every 30 days.  Dispense: 6 mL; Refill: 2  -     potassium chloride (KLOR-CON) 10 MEQ TbSR; Take 1 tablet (10 mEq total) by mouth once daily.  Dispense: 90 tablet; Refill: 3  -     valsartan (DIOVAN) 320 MG tablet; Take 1 tablet (320 mg total) by mouth once daily.  Dispense: 90 tablet; Refill: 3  -     Sedimentation rate; Future; Expected date: 05/28/2023  -     C-Reactive Protein; Future; Expected date: 05/28/2023  -     Comprehensive Metabolic Panel; Future; Expected date: 05/28/2023  -     CBC Auto Differential; Future; Expected date: 05/28/2023    Lumbar and sacral osteoarthritis  -     pregabalin (LYRICA)  25 MG capsule; Take 1 capsule (25 mg total) by mouth every evening.  Dispense: 30 capsule; Refill: 5  -     Ambulatory referral/consult to Neurosurgery; Future; Expected date: 05/31/2023  -     MRI Lumbar Spine Without Contrast; Future; Expected date: 05/28/2023  -     ALPRAZolam (XANAX) 0.25 MG tablet; Take 1 tablet (0.25 mg total) by mouth 4 (four) times daily as needed for Anxiety.  Dispense: 28 tablet; Refill: 2  -     EScitalopram oxalate (LEXAPRO) 20 MG tablet; Take 1 tablet (20 mg total) by mouth once daily.  Dispense: 90 tablet; Refill: 3  -     hydrOXYchloroQUINE (PLAQUENIL) 200 mg tablet; Take 1 tablet (200 mg total) by mouth 2 (two) times daily.  Dispense: 180 tablet; Refill: 3  -     ketorolac (TORADOL) 60 mg/2 mL Soln; 2 ml sq monthly  Dispense: 2 mL; Refill: 6  -     methylPREDNISolone acetate (DEPO-MEDROL) 80 mg/mL injection; Inject 2 mLs (160 mg total) into the muscle every 30 days.  Dispense: 6 mL; Refill: 2  -     potassium chloride (KLOR-CON) 10 MEQ TbSR; Take 1 tablet (10 mEq total) by mouth once daily.  Dispense: 90 tablet; Refill: 3  -     valsartan (DIOVAN) 320 MG tablet; Take 1 tablet (320 mg total) by mouth once daily.  Dispense: 90 tablet; Refill: 3  -     Sedimentation rate; Future; Expected date: 05/28/2023  -     C-Reactive Protein; Future; Expected date: 05/28/2023  -     Comprehensive Metabolic Panel; Future; Expected date: 05/28/2023  -     CBC Auto Differential; Future; Expected date: 05/28/2023    SLE (systemic lupus erythematosus related syndrome)  -     pregabalin (LYRICA) 25 MG capsule; Take 1 capsule (25 mg total) by mouth every evening.  Dispense: 30 capsule; Refill: 5  -     Ambulatory referral/consult to Neurosurgery; Future; Expected date: 05/31/2023  -     MRI Lumbar Spine Without Contrast; Future; Expected date: 05/28/2023  -     ALPRAZolam (XANAX) 0.25 MG tablet; Take 1 tablet (0.25 mg total) by mouth 4 (four) times daily as needed for Anxiety.  Dispense: 28 tablet;  Refill: 2  -     EScitalopram oxalate (LEXAPRO) 20 MG tablet; Take 1 tablet (20 mg total) by mouth once daily.  Dispense: 90 tablet; Refill: 3  -     hydrOXYchloroQUINE (PLAQUENIL) 200 mg tablet; Take 1 tablet (200 mg total) by mouth 2 (two) times daily.  Dispense: 180 tablet; Refill: 3  -     ketorolac (TORADOL) 60 mg/2 mL Soln; 2 ml sq monthly  Dispense: 2 mL; Refill: 6  -     methylPREDNISolone acetate (DEPO-MEDROL) 80 mg/mL injection; Inject 2 mLs (160 mg total) into the muscle every 30 days.  Dispense: 6 mL; Refill: 2  -     potassium chloride (KLOR-CON) 10 MEQ TbSR; Take 1 tablet (10 mEq total) by mouth once daily.  Dispense: 90 tablet; Refill: 3  -     valsartan (DIOVAN) 320 MG tablet; Take 1 tablet (320 mg total) by mouth once daily.  Dispense: 90 tablet; Refill: 3  -     Sedimentation rate; Future; Expected date: 05/28/2023  -     C-Reactive Protein; Future; Expected date: 05/28/2023  -     Comprehensive Metabolic Panel; Future; Expected date: 05/28/2023  -     CBC Auto Differential; Future; Expected date: 05/28/2023    Sjogren's syndrome, with unspecified organ involvement  -     pregabalin (LYRICA) 25 MG capsule; Take 1 capsule (25 mg total) by mouth every evening.  Dispense: 30 capsule; Refill: 5  -     Ambulatory referral/consult to Neurosurgery; Future; Expected date: 05/31/2023  -     MRI Lumbar Spine Without Contrast; Future; Expected date: 05/28/2023  -     ALPRAZolam (XANAX) 0.25 MG tablet; Take 1 tablet (0.25 mg total) by mouth 4 (four) times daily as needed for Anxiety.  Dispense: 28 tablet; Refill: 2  -     EScitalopram oxalate (LEXAPRO) 20 MG tablet; Take 1 tablet (20 mg total) by mouth once daily.  Dispense: 90 tablet; Refill: 3  -     hydrOXYchloroQUINE (PLAQUENIL) 200 mg tablet; Take 1 tablet (200 mg total) by mouth 2 (two) times daily.  Dispense: 180 tablet; Refill: 3  -     ketorolac (TORADOL) 60 mg/2 mL Soln; 2 ml sq monthly  Dispense: 2 mL; Refill: 6  -     methylPREDNISolone acetate  (DEPO-MEDROL) 80 mg/mL injection; Inject 2 mLs (160 mg total) into the muscle every 30 days.  Dispense: 6 mL; Refill: 2  -     potassium chloride (KLOR-CON) 10 MEQ TbSR; Take 1 tablet (10 mEq total) by mouth once daily.  Dispense: 90 tablet; Refill: 3  -     valsartan (DIOVAN) 320 MG tablet; Take 1 tablet (320 mg total) by mouth once daily.  Dispense: 90 tablet; Refill: 3  -     Sedimentation rate; Future; Expected date: 05/28/2023  -     C-Reactive Protein; Future; Expected date: 05/28/2023  -     Comprehensive Metabolic Panel; Future; Expected date: 05/28/2023  -     CBC Auto Differential; Future; Expected date: 05/28/2023    Dorsalgia, unspecified  -     pregabalin (LYRICA) 25 MG capsule; Take 1 capsule (25 mg total) by mouth every evening.  Dispense: 30 capsule; Refill: 5  -     Ambulatory referral/consult to Neurosurgery; Future; Expected date: 05/31/2023  -     MRI Lumbar Spine Without Contrast; Future; Expected date: 05/28/2023  -     ALPRAZolam (XANAX) 0.25 MG tablet; Take 1 tablet (0.25 mg total) by mouth 4 (four) times daily as needed for Anxiety.  Dispense: 28 tablet; Refill: 2  -     EScitalopram oxalate (LEXAPRO) 20 MG tablet; Take 1 tablet (20 mg total) by mouth once daily.  Dispense: 90 tablet; Refill: 3  -     hydrOXYchloroQUINE (PLAQUENIL) 200 mg tablet; Take 1 tablet (200 mg total) by mouth 2 (two) times daily.  Dispense: 180 tablet; Refill: 3  -     ketorolac (TORADOL) 60 mg/2 mL Soln; 2 ml sq monthly  Dispense: 2 mL; Refill: 6  -     methylPREDNISolone acetate (DEPO-MEDROL) 80 mg/mL injection; Inject 2 mLs (160 mg total) into the muscle every 30 days.  Dispense: 6 mL; Refill: 2  -     potassium chloride (KLOR-CON) 10 MEQ TbSR; Take 1 tablet (10 mEq total) by mouth once daily.  Dispense: 90 tablet; Refill: 3  -     valsartan (DIOVAN) 320 MG tablet; Take 1 tablet (320 mg total) by mouth once daily.  Dispense: 90 tablet; Refill: 3  -     Sedimentation rate; Future; Expected date: 05/28/2023  -      C-Reactive Protein; Future; Expected date: 05/28/2023  -     Comprehensive Metabolic Panel; Future; Expected date: 05/28/2023  -     CBC Auto Differential; Future; Expected date: 05/28/2023    Lumbar radiculopathy, chronic  -     pregabalin (LYRICA) 25 MG capsule; Take 1 capsule (25 mg total) by mouth every evening.  Dispense: 30 capsule; Refill: 5  -     Ambulatory referral/consult to Neurosurgery; Future; Expected date: 05/31/2023  -     MRI Lumbar Spine Without Contrast; Future; Expected date: 05/28/2023  -     ALPRAZolam (XANAX) 0.25 MG tablet; Take 1 tablet (0.25 mg total) by mouth 4 (four) times daily as needed for Anxiety.  Dispense: 28 tablet; Refill: 2  -     EScitalopram oxalate (LEXAPRO) 20 MG tablet; Take 1 tablet (20 mg total) by mouth once daily.  Dispense: 90 tablet; Refill: 3  -     hydrOXYchloroQUINE (PLAQUENIL) 200 mg tablet; Take 1 tablet (200 mg total) by mouth 2 (two) times daily.  Dispense: 180 tablet; Refill: 3  -     ketorolac (TORADOL) 60 mg/2 mL Soln; 2 ml sq monthly  Dispense: 2 mL; Refill: 6  -     methylPREDNISolone acetate (DEPO-MEDROL) 80 mg/mL injection; Inject 2 mLs (160 mg total) into the muscle every 30 days.  Dispense: 6 mL; Refill: 2  -     potassium chloride (KLOR-CON) 10 MEQ TbSR; Take 1 tablet (10 mEq total) by mouth once daily.  Dispense: 90 tablet; Refill: 3  -     valsartan (DIOVAN) 320 MG tablet; Take 1 tablet (320 mg total) by mouth once daily.  Dispense: 90 tablet; Refill: 3  -     Sedimentation rate; Future; Expected date: 05/28/2023  -     C-Reactive Protein; Future; Expected date: 05/28/2023  -     Comprehensive Metabolic Panel; Future; Expected date: 05/28/2023  -     CBC Auto Differential; Future; Expected date: 05/28/2023    Systemic lupus erythematosus, unspecified SLE type, unspecified organ involvement status  -     pregabalin (LYRICA) 25 MG capsule; Take 1 capsule (25 mg total) by mouth every evening.  Dispense: 30 capsule; Refill: 5  -     Ambulatory  referral/consult to Neurosurgery; Future; Expected date: 05/31/2023  -     MRI Lumbar Spine Without Contrast; Future; Expected date: 05/28/2023  -     ALPRAZolam (XANAX) 0.25 MG tablet; Take 1 tablet (0.25 mg total) by mouth 4 (four) times daily as needed for Anxiety.  Dispense: 28 tablet; Refill: 2  -     EScitalopram oxalate (LEXAPRO) 20 MG tablet; Take 1 tablet (20 mg total) by mouth once daily.  Dispense: 90 tablet; Refill: 3  -     hydrOXYchloroQUINE (PLAQUENIL) 200 mg tablet; Take 1 tablet (200 mg total) by mouth 2 (two) times daily.  Dispense: 180 tablet; Refill: 3  -     ketorolac (TORADOL) 60 mg/2 mL Soln; 2 ml sq monthly  Dispense: 2 mL; Refill: 6  -     methylPREDNISolone acetate (DEPO-MEDROL) 80 mg/mL injection; Inject 2 mLs (160 mg total) into the muscle every 30 days.  Dispense: 6 mL; Refill: 2  -     potassium chloride (KLOR-CON) 10 MEQ TbSR; Take 1 tablet (10 mEq total) by mouth once daily.  Dispense: 90 tablet; Refill: 3  -     valsartan (DIOVAN) 320 MG tablet; Take 1 tablet (320 mg total) by mouth once daily.  Dispense: 90 tablet; Refill: 3  -     Sedimentation rate; Future; Expected date: 05/28/2023  -     C-Reactive Protein; Future; Expected date: 05/28/2023  -     Comprehensive Metabolic Panel; Future; Expected date: 05/28/2023  -     CBC Auto Differential; Future; Expected date: 05/28/2023    Essential hypertension  -     pregabalin (LYRICA) 25 MG capsule; Take 1 capsule (25 mg total) by mouth every evening.  Dispense: 30 capsule; Refill: 5  -     Ambulatory referral/consult to Neurosurgery; Future; Expected date: 05/31/2023  -     MRI Lumbar Spine Without Contrast; Future; Expected date: 05/28/2023  -     ALPRAZolam (XANAX) 0.25 MG tablet; Take 1 tablet (0.25 mg total) by mouth 4 (four) times daily as needed for Anxiety.  Dispense: 28 tablet; Refill: 2  -     EScitalopram oxalate (LEXAPRO) 20 MG tablet; Take 1 tablet (20 mg total) by mouth once daily.  Dispense: 90 tablet; Refill: 3  -      hydrOXYchloroQUINE (PLAQUENIL) 200 mg tablet; Take 1 tablet (200 mg total) by mouth 2 (two) times daily.  Dispense: 180 tablet; Refill: 3  -     ketorolac (TORADOL) 60 mg/2 mL Soln; 2 ml sq monthly  Dispense: 2 mL; Refill: 6  -     methylPREDNISolone acetate (DEPO-MEDROL) 80 mg/mL injection; Inject 2 mLs (160 mg total) into the muscle every 30 days.  Dispense: 6 mL; Refill: 2  -     potassium chloride (KLOR-CON) 10 MEQ TbSR; Take 1 tablet (10 mEq total) by mouth once daily.  Dispense: 90 tablet; Refill: 3  -     valsartan (DIOVAN) 320 MG tablet; Take 1 tablet (320 mg total) by mouth once daily.  Dispense: 90 tablet; Refill: 3  -     Sedimentation rate; Future; Expected date: 05/28/2023  -     C-Reactive Protein; Future; Expected date: 05/28/2023  -     Comprehensive Metabolic Panel; Future; Expected date: 05/28/2023  -     CBC Auto Differential; Future; Expected date: 05/28/2023    Anxiety  -     pregabalin (LYRICA) 25 MG capsule; Take 1 capsule (25 mg total) by mouth every evening.  Dispense: 30 capsule; Refill: 5  -     Ambulatory referral/consult to Neurosurgery; Future; Expected date: 05/31/2023  -     MRI Lumbar Spine Without Contrast; Future; Expected date: 05/28/2023  -     ALPRAZolam (XANAX) 0.25 MG tablet; Take 1 tablet (0.25 mg total) by mouth 4 (four) times daily as needed for Anxiety.  Dispense: 28 tablet; Refill: 2  -     EScitalopram oxalate (LEXAPRO) 20 MG tablet; Take 1 tablet (20 mg total) by mouth once daily.  Dispense: 90 tablet; Refill: 3  -     hydrOXYchloroQUINE (PLAQUENIL) 200 mg tablet; Take 1 tablet (200 mg total) by mouth 2 (two) times daily.  Dispense: 180 tablet; Refill: 3  -     ketorolac (TORADOL) 60 mg/2 mL Soln; 2 ml sq monthly  Dispense: 2 mL; Refill: 6  -     methylPREDNISolone acetate (DEPO-MEDROL) 80 mg/mL injection; Inject 2 mLs (160 mg total) into the muscle every 30 days.  Dispense: 6 mL; Refill: 2  -     potassium chloride (KLOR-CON) 10 MEQ TbSR; Take 1 tablet (10 mEq total)  by mouth once daily.  Dispense: 90 tablet; Refill: 3  -     valsartan (DIOVAN) 320 MG tablet; Take 1 tablet (320 mg total) by mouth once daily.  Dispense: 90 tablet; Refill: 3    Sinus congestion  -     ALPRAZolam (XANAX) 0.25 MG tablet; Take 1 tablet (0.25 mg total) by mouth 4 (four) times daily as needed for Anxiety.  Dispense: 28 tablet; Refill: 2  -     EScitalopram oxalate (LEXAPRO) 20 MG tablet; Take 1 tablet (20 mg total) by mouth once daily.  Dispense: 90 tablet; Refill: 3  -     hydrOXYchloroQUINE (PLAQUENIL) 200 mg tablet; Take 1 tablet (200 mg total) by mouth 2 (two) times daily.  Dispense: 180 tablet; Refill: 3  -     ketorolac (TORADOL) 60 mg/2 mL Soln; 2 ml sq monthly  Dispense: 2 mL; Refill: 6  -     methylPREDNISolone acetate (DEPO-MEDROL) 80 mg/mL injection; Inject 2 mLs (160 mg total) into the muscle every 30 days.  Dispense: 6 mL; Refill: 2  -     potassium chloride (KLOR-CON) 10 MEQ TbSR; Take 1 tablet (10 mEq total) by mouth once daily.  Dispense: 90 tablet; Refill: 3  -     valsartan (DIOVAN) 320 MG tablet; Take 1 tablet (320 mg total) by mouth once daily.  Dispense: 90 tablet; Refill: 3    Dizziness  -     pregabalin (LYRICA) 25 MG capsule; Take 1 capsule (25 mg total) by mouth every evening.  Dispense: 30 capsule; Refill: 5  -     Ambulatory referral/consult to Neurosurgery; Future; Expected date: 05/31/2023  -     MRI Lumbar Spine Without Contrast; Future; Expected date: 05/28/2023  -     ALPRAZolam (XANAX) 0.25 MG tablet; Take 1 tablet (0.25 mg total) by mouth 4 (four) times daily as needed for Anxiety.  Dispense: 28 tablet; Refill: 2  -     EScitalopram oxalate (LEXAPRO) 20 MG tablet; Take 1 tablet (20 mg total) by mouth once daily.  Dispense: 90 tablet; Refill: 3  -     hydrOXYchloroQUINE (PLAQUENIL) 200 mg tablet; Take 1 tablet (200 mg total) by mouth 2 (two) times daily.  Dispense: 180 tablet; Refill: 3  -     ketorolac (TORADOL) 60 mg/2 mL Soln; 2 ml sq monthly  Dispense: 2 mL; Refill:  6  -     methylPREDNISolone acetate (DEPO-MEDROL) 80 mg/mL injection; Inject 2 mLs (160 mg total) into the muscle every 30 days.  Dispense: 6 mL; Refill: 2  -     potassium chloride (KLOR-CON) 10 MEQ TbSR; Take 1 tablet (10 mEq total) by mouth once daily.  Dispense: 90 tablet; Refill: 3  -     valsartan (DIOVAN) 320 MG tablet; Take 1 tablet (320 mg total) by mouth once daily.  Dispense: 90 tablet; Refill: 3    Chronic pain syndrome  -     pregabalin (LYRICA) 25 MG capsule; Take 1 capsule (25 mg total) by mouth every evening.  Dispense: 30 capsule; Refill: 5  -     Ambulatory referral/consult to Neurosurgery; Future; Expected date: 05/31/2023  -     MRI Lumbar Spine Without Contrast; Future; Expected date: 05/28/2023  -     ALPRAZolam (XANAX) 0.25 MG tablet; Take 1 tablet (0.25 mg total) by mouth 4 (four) times daily as needed for Anxiety.  Dispense: 28 tablet; Refill: 2  -     EScitalopram oxalate (LEXAPRO) 20 MG tablet; Take 1 tablet (20 mg total) by mouth once daily.  Dispense: 90 tablet; Refill: 3  -     hydrOXYchloroQUINE (PLAQUENIL) 200 mg tablet; Take 1 tablet (200 mg total) by mouth 2 (two) times daily.  Dispense: 180 tablet; Refill: 3  -     ketorolac (TORADOL) 60 mg/2 mL Soln; 2 ml sq monthly  Dispense: 2 mL; Refill: 6  -     methylPREDNISolone acetate (DEPO-MEDROL) 80 mg/mL injection; Inject 2 mLs (160 mg total) into the muscle every 30 days.  Dispense: 6 mL; Refill: 2  -     potassium chloride (KLOR-CON) 10 MEQ TbSR; Take 1 tablet (10 mEq total) by mouth once daily.  Dispense: 90 tablet; Refill: 3  -     valsartan (DIOVAN) 320 MG tablet; Take 1 tablet (320 mg total) by mouth once daily.  Dispense: 90 tablet; Refill: 3    Mixed connective tissue disease  -     pregabalin (LYRICA) 25 MG capsule; Take 1 capsule (25 mg total) by mouth every evening.  Dispense: 30 capsule; Refill: 5  -     Ambulatory referral/consult to Neurosurgery; Future; Expected date: 05/31/2023  -     MRI Lumbar Spine Without Contrast;  Future; Expected date: 05/28/2023  -     ALPRAZolam (XANAX) 0.25 MG tablet; Take 1 tablet (0.25 mg total) by mouth 4 (four) times daily as needed for Anxiety.  Dispense: 28 tablet; Refill: 2  -     EScitalopram oxalate (LEXAPRO) 20 MG tablet; Take 1 tablet (20 mg total) by mouth once daily.  Dispense: 90 tablet; Refill: 3  -     hydrOXYchloroQUINE (PLAQUENIL) 200 mg tablet; Take 1 tablet (200 mg total) by mouth 2 (two) times daily.  Dispense: 180 tablet; Refill: 3  -     ketorolac (TORADOL) 60 mg/2 mL Soln; 2 ml sq monthly  Dispense: 2 mL; Refill: 6  -     methylPREDNISolone acetate (DEPO-MEDROL) 80 mg/mL injection; Inject 2 mLs (160 mg total) into the muscle every 30 days.  Dispense: 6 mL; Refill: 2  -     potassium chloride (KLOR-CON) 10 MEQ TbSR; Take 1 tablet (10 mEq total) by mouth once daily.  Dispense: 90 tablet; Refill: 3  -     valsartan (DIOVAN) 320 MG tablet; Take 1 tablet (320 mg total) by mouth once daily.  Dispense: 90 tablet; Refill: 3    Candida albicans infection  -     methylPREDNISolone acetate (DEPO-MEDROL) 80 mg/mL injection; Inject 2 mLs (160 mg total) into the muscle every 30 days.  Dispense: 6 mL; Refill: 2          More than 50% of the  40 minute encounter was spent face to face counseling the patient regarding current status and future plan of care as well as side of the medications. All questions were answered to patient's satisfaction    Assessment:  64 year old female with  SLE, Sjogren's syndrome on plaquenil  --fibromyalgia  --lumbar and sacral arthritis  --chronic pain syndrome on tylenol 3 sparingly    Plan:  1.  Lyrica 25 mg qhs. Only using zanaflex when doing  poorly  2. Cont. Plaquenil 200 mg bid  3. Cont. Toradol/depo every 6-8 wks prn  5. Order mri  6.  voltaren gel pt will get OTC      The patient location is: home  The chief complaint leading to consultation is: sjogren's    Visit type: audiovisual    Face to Face time with patient: 40   minutes of total time spent on the  encounter, which includes face to face time and non-face to face time preparing to see the patient (eg, review of tests), Obtaining and/or reviewing separately obtained history, Documenting clinical information in the electronic or other health record, Independently interpreting results (not separately reported) and communicating results to the patient/family/caregiver, or Care coordination (not separately reported).         Each patient to whom he or she provides medical services by telemedicine is:  (1) informed of the relationship between the physician and patient and the respective role of any other health care provider with respect to management of the patient; and (2) notified that he or she may decline to receive medical services by telemedicine and may withdraw from such care at any time.    Notes:

## 2023-05-28 RX ORDER — VALSARTAN 320 MG/1
320 TABLET ORAL DAILY
Qty: 90 TABLET | Refills: 3 | Status: SHIPPED | OUTPATIENT
Start: 2023-05-28 | End: 2023-05-29

## 2023-05-28 RX ORDER — PREGABALIN 25 MG/1
25 CAPSULE ORAL NIGHTLY
Qty: 30 CAPSULE | Refills: 5 | Status: SHIPPED | OUTPATIENT
Start: 2023-05-28 | End: 2023-11-26

## 2023-05-28 RX ORDER — ESCITALOPRAM OXALATE 20 MG/1
20 TABLET ORAL DAILY
Qty: 90 TABLET | Refills: 3 | Status: SHIPPED | OUTPATIENT
Start: 2023-05-28 | End: 2023-05-29

## 2023-05-28 RX ORDER — KETOROLAC TROMETHAMINE 30 MG/ML
INJECTION, SOLUTION INTRAMUSCULAR; INTRAVENOUS
Qty: 2 ML | Refills: 6 | Status: SHIPPED | OUTPATIENT
Start: 2023-05-28 | End: 2023-05-29

## 2023-05-28 RX ORDER — METHYLPREDNISOLONE ACETATE 80 MG/ML
160 INJECTION, SUSPENSION INTRA-ARTICULAR; INTRALESIONAL; INTRAMUSCULAR; SOFT TISSUE
Qty: 6 ML | Refills: 2 | Status: SHIPPED | OUTPATIENT
Start: 2023-05-28 | End: 2023-05-29

## 2023-05-28 RX ORDER — HYDROXYCHLOROQUINE SULFATE 200 MG/1
200 TABLET, FILM COATED ORAL 2 TIMES DAILY
Qty: 180 TABLET | Refills: 3 | Status: SHIPPED | OUTPATIENT
Start: 2023-05-28 | End: 2023-05-29

## 2023-05-28 RX ORDER — ALPRAZOLAM 0.25 MG/1
0.25 TABLET ORAL 4 TIMES DAILY PRN
Qty: 28 TABLET | Refills: 2 | Status: SHIPPED | OUTPATIENT
Start: 2023-05-28 | End: 2023-05-29

## 2023-05-28 RX ORDER — POTASSIUM CHLORIDE 750 MG/1
10 TABLET, EXTENDED RELEASE ORAL DAILY
Qty: 90 TABLET | Refills: 3 | Status: SHIPPED | OUTPATIENT
Start: 2023-05-28 | End: 2023-05-29

## 2023-05-29 ENCOUNTER — PATIENT MESSAGE (OUTPATIENT)
Dept: RHEUMATOLOGY | Facility: CLINIC | Age: 68
End: 2023-05-29
Payer: MEDICARE

## 2023-05-29 NOTE — TELEPHONE ENCOUNTER
Contacted patient and confirmed what medications needed to be sent. Patient stated she does not use [facility] anymore. Nurse confirmed [facility] is the only one listed on her chart. Informed will send Dr Moctezuma a message to send to [facility] instead. Verbalized understanding.

## 2023-05-30 ENCOUNTER — TELEPHONE (OUTPATIENT)
Dept: NEUROSURGERY | Facility: CLINIC | Age: 68
End: 2023-05-30
Payer: MEDICARE

## 2023-05-30 ENCOUNTER — PATIENT MESSAGE (OUTPATIENT)
Dept: RHEUMATOLOGY | Facility: CLINIC | Age: 68
End: 2023-05-30
Payer: MEDICARE

## 2023-05-30 RX ORDER — ALPRAZOLAM 0.25 MG/1
0.25 TABLET ORAL 4 TIMES DAILY PRN
Qty: 28 TABLET | Refills: 2 | Status: SHIPPED | OUTPATIENT
Start: 2023-05-30 | End: 2023-06-29

## 2023-05-30 RX ORDER — HYDROXYCHLOROQUINE SULFATE 200 MG/1
200 TABLET, FILM COATED ORAL 2 TIMES DAILY
Qty: 60 TABLET | Refills: 3 | Status: SHIPPED | OUTPATIENT
Start: 2023-05-30 | End: 2023-09-27

## 2023-05-30 RX ORDER — KETOROLAC TROMETHAMINE 30 MG/ML
60 INJECTION, SOLUTION INTRAMUSCULAR; INTRAVENOUS
Qty: 2 ML | Refills: 6 | Status: SHIPPED | OUTPATIENT
Start: 2023-05-30

## 2023-05-30 RX ORDER — VALSARTAN 320 MG/1
320 TABLET ORAL DAILY
Qty: 90 TABLET | Refills: 3 | Status: SHIPPED | OUTPATIENT
Start: 2023-05-30 | End: 2024-05-29

## 2023-05-30 RX ORDER — POTASSIUM CHLORIDE 750 MG/1
10 TABLET, EXTENDED RELEASE ORAL DAILY
Qty: 30 TABLET | Refills: 6 | Status: SHIPPED | OUTPATIENT
Start: 2023-05-30

## 2023-05-30 RX ORDER — METHYLPREDNISOLONE ACETATE 80 MG/ML
80 INJECTION, SUSPENSION INTRA-ARTICULAR; INTRALESIONAL; INTRAMUSCULAR; SOFT TISSUE ONCE
Qty: 1 ML | Refills: 0 | Status: SHIPPED | OUTPATIENT
Start: 2023-05-30 | End: 2023-05-30

## 2023-05-30 RX ORDER — ESCITALOPRAM OXALATE 20 MG/1
20 TABLET ORAL DAILY
Qty: 30 TABLET | Refills: 11 | Status: SHIPPED | OUTPATIENT
Start: 2023-05-30 | End: 2024-05-29

## 2023-05-30 NOTE — TELEPHONE ENCOUNTER
----- Message from Ritesh Becerra sent at 5/30/2023  1:41 PM CDT -----  Regarding: ret call  Contact: ANGELICA REID [[id number]]  Type:  Patient Returning Call    Who Called:  Angelica    Who Left Message for Patient:  Akanksha    Does the patient know what this is regarding?:  Yes    Best Call Back Number:  [phone]    Additional Information:  Trying to schedule MRI at [facility] in [place]. Please call to advise.

## 2023-05-31 ENCOUNTER — PATIENT MESSAGE (OUTPATIENT)
Dept: RHEUMATOLOGY | Facility: CLINIC | Age: 68
End: 2023-05-31
Payer: MEDICARE

## 2023-05-31 RX ORDER — ESTRADIOL 1 MG/1
1 TABLET ORAL DAILY
Qty: 90 TABLET | Refills: 1 | Status: SHIPPED | OUTPATIENT
Start: 2023-05-31

## 2023-05-31 RX ORDER — TIZANIDINE 4 MG/1
4 TABLET ORAL EVERY 6 HOURS PRN
Qty: 120 TABLET | Refills: 3 | Status: SHIPPED | OUTPATIENT
Start: 2023-05-31 | End: 2023-11-27

## 2023-06-02 NOTE — TELEPHONE ENCOUNTER
----- Message from Tony Reyes sent at 6/2/2023  2:10 PM CDT -----  Regarding: Return Call  Contact: Tab with [facility]  Type:  Patient Returning Call    Who Called:Tab with [facility]  Who Left Message for Patient:office staff  Does the patient know what this is regarding?:xray order sent to above facility  Would the patient rather a call back or a response via [facility]? fax  Best Call Back Number:Fax: [phone]  Additional Information: Please fax order.

## 2023-06-02 NOTE — TELEPHONE ENCOUNTER
----- Message from Crystal Jernigan sent at 6/1/2023  9:59 AM CDT -----  Type: Needs Medical Advice  Who Called:  pt  Symptoms (please be specific):  pt said she need to speak to the nurse--said she need to talk to her about her MRI and she said it's been over a month and she have called several time to only get no response--said she also need to speak to her about her meds refills--said they did not fill 2 of her meds that she out of-said she really fed up with the communication from pt to office---please call and advise  Best Call Back Number: [phone] (home)     Additional Information: thank you

## 2023-06-05 NOTE — TELEPHONE ENCOUNTER
----- Message from Tim Mcghee sent at 6/5/2023 11:49 AM CDT -----  Type: Needs Medical Advice  Who Called:  Patient    Best Call Back Number: [phone]  Additional Information: Patient states that she has been waiting for a callback from Dr. Moctezuma-- NOT a nurse regarding her MRI orders to be sent to [facility] in [place].    Please call STAT-- Pt is very upset

## 2023-06-05 NOTE — TELEPHONE ENCOUNTER
----- Message from Denise Ocampo sent at 6/5/2023  3:08 PM CDT -----  Contact: Laurie Kelly @ [phone]  Type:  Needs Medical Advice    Who Called: Laurie Kelly  Would the patient rather a call back or a response via [facility]? call  Best Call Back Number:  [phone]    Additional Information: Laurie wants the office to call [facility] in regards to a Pre Auth for an MRI for the pt, and also call the pt to let her know the status. [employer] callback number is [phone].

## 2023-06-14 ENCOUNTER — TELEPHONE (OUTPATIENT)
Dept: NEUROSURGERY | Facility: CLINIC | Age: 68
End: 2023-06-14
Payer: MEDICARE

## 2023-06-14 NOTE — TELEPHONE ENCOUNTER
Called patient in regards to referral received from Sherwin Moctezuma MD for scheduling with Bautista Jacobs MD. MRI ordered. No answer. LVM to call clinic for scheduling

## 2023-06-19 ENCOUNTER — TELEPHONE (OUTPATIENT)
Dept: RHEUMATOLOGY | Facility: CLINIC | Age: 68
End: 2023-06-19
Payer: MEDICARE

## 2023-06-19 NOTE — TELEPHONE ENCOUNTER
----- Message from Oliva Jasmine sent at 6/16/2023  2:26 PM CDT -----  Regarding: appointment  Contact: PAtient  Type:  Sooner Appointment Request    Caller is requesting a sooner appointment.  Caller declined first available appointment listed below.  Caller will not accept being placed on the waitlist and is requesting a message be sent to doctor.    Name of Caller:  Patient   When is the first available appointment?    Symptoms:    Best Call Back Number:  [phone] (home)     Additional Information:  Patient stated that she would like to get an appointment due to back issues. Patient would like to speak with the doctor or the nurse concerning issue. Patient stated that she would like to speak over the phone instead of through the portal. Please contact patient to advise. Thanks!

## 2023-06-20 ENCOUNTER — PATIENT MESSAGE (OUTPATIENT)
Dept: RHEUMATOLOGY | Facility: CLINIC | Age: 68
End: 2023-06-20
Payer: MEDICARE

## 2023-06-21 ENCOUNTER — TELEPHONE (OUTPATIENT)
Dept: NEUROSURGERY | Facility: CLINIC | Age: 68
End: 2023-06-21
Payer: MEDICARE

## 2023-07-12 ENCOUNTER — PATIENT MESSAGE (OUTPATIENT)
Dept: RHEUMATOLOGY | Facility: CLINIC | Age: 68
End: 2023-07-12
Payer: MEDICARE

## 2023-10-11 ENCOUNTER — TELEPHONE (OUTPATIENT)
Dept: RHEUMATOLOGY | Facility: CLINIC | Age: 68
End: 2023-10-11
Payer: MEDICARE

## 2023-10-11 NOTE — TELEPHONE ENCOUNTER
----- Message from Jose Alejandro Estrada sent at 10/11/2023 10:29 AM CDT -----  Contact: pt  Type: Needs Medical Advice  Who Called:  pt  Best Call Back Number: [phone]    Additional Information: Pt is calling the office missed appt trying to see if she can get in for virtual appt.Please call back and advise.

## 2023-10-11 NOTE — TELEPHONE ENCOUNTER
Let patient know spot had already been filled and Anabella would have to get her rescheduled for another day and time.

## 2023-10-18 ENCOUNTER — TELEPHONE (OUTPATIENT)
Dept: RHEUMATOLOGY | Facility: CLINIC | Age: 68
End: 2023-10-18
Payer: MEDICARE

## 2023-10-18 NOTE — TELEPHONE ENCOUNTER
----- Message from Guillermina Jennings sent at 10/18/2023 11:01 AM CDT -----  Type: Needs Medical Advice  Who Called:  pt  Best Call Back Number: [phone]  Additional Information: pt is calling to reschedule her appt she missed on 10/11, pl call bk to advise thanks

## 2024-02-26 RX ORDER — POTASSIUM CHLORIDE 750 MG/1
10 TABLET, EXTENDED RELEASE ORAL
Qty: 90 TABLET | Refills: 3 | OUTPATIENT
Start: 2024-02-26

## 2024-02-27 DIAGNOSIS — M32.9 SLE (SYSTEMIC LUPUS ERYTHEMATOSUS RELATED SYNDROME): ICD-10-CM

## 2024-02-27 DIAGNOSIS — G89.4 CHRONIC PAIN SYNDROME: Primary | ICD-10-CM

## 2024-02-28 DIAGNOSIS — M47.817 LUMBAR AND SACRAL OSTEOARTHRITIS: ICD-10-CM

## 2024-02-28 DIAGNOSIS — I10 ESSENTIAL HYPERTENSION: ICD-10-CM

## 2024-02-28 DIAGNOSIS — R09.81 SINUS CONGESTION: ICD-10-CM

## 2024-02-28 DIAGNOSIS — M32.9 SYSTEMIC LUPUS ERYTHEMATOSUS, UNSPECIFIED SLE TYPE, UNSPECIFIED ORGAN INVOLVEMENT STATUS: ICD-10-CM

## 2024-02-28 DIAGNOSIS — G89.4 CHRONIC PAIN SYNDROME: ICD-10-CM

## 2024-02-28 DIAGNOSIS — M35.1 MIXED CONNECTIVE TISSUE DISEASE: ICD-10-CM

## 2024-02-28 DIAGNOSIS — M79.7 FIBROMYALGIA: ICD-10-CM

## 2024-02-28 DIAGNOSIS — F41.9 ANXIETY: ICD-10-CM

## 2024-02-28 DIAGNOSIS — R42 DIZZINESS: ICD-10-CM

## 2024-02-28 DIAGNOSIS — M35.00 SJOGREN'S SYNDROME, WITH UNSPECIFIED ORGAN INVOLVEMENT: ICD-10-CM

## 2024-02-28 RX ORDER — HYDROCHLOROTHIAZIDE 25 MG/1
25 TABLET ORAL
Qty: 90 TABLET | Refills: 3 | OUTPATIENT
Start: 2024-02-28

## 2024-02-28 RX ORDER — METHYLPREDNISOLONE ACETATE 80 MG/ML
INJECTION, SUSPENSION INTRA-ARTICULAR; INTRALESIONAL; INTRAMUSCULAR; SOFT TISSUE
Qty: 1 ML | Refills: 3 | Status: SHIPPED | OUTPATIENT
Start: 2024-02-28

## 2024-06-08 DIAGNOSIS — F41.9 ANXIETY: Primary | ICD-10-CM

## 2024-06-13 RX ORDER — ESCITALOPRAM OXALATE 20 MG/1
20 TABLET ORAL
Qty: 30 TABLET | Refills: 2 | Status: SHIPPED | OUTPATIENT
Start: 2024-06-13

## 2024-06-13 NOTE — TELEPHONE ENCOUNTER
Needs a f/u visit scheduled--she has not been seen in a year. I sent 3 months of lexapro but will need appt for refills moving forward

## 2024-08-19 ENCOUNTER — OFFICE VISIT (OUTPATIENT)
Dept: RHEUMATOLOGY | Facility: CLINIC | Age: 69
End: 2024-08-19
Payer: MEDICARE

## 2024-08-19 VITALS
BODY MASS INDEX: 35.11 KG/M2 | SYSTOLIC BLOOD PRESSURE: 131 MMHG | WEIGHT: 211 LBS | DIASTOLIC BLOOD PRESSURE: 80 MMHG | HEART RATE: 69 BPM

## 2024-08-19 DIAGNOSIS — F41.9 ANXIETY: ICD-10-CM

## 2024-08-19 DIAGNOSIS — F41.9 ANXIETY: Primary | ICD-10-CM

## 2024-08-19 DIAGNOSIS — Z86.73 HISTORY OF CVA (CEREBROVASCULAR ACCIDENT): ICD-10-CM

## 2024-08-19 DIAGNOSIS — Z79.890 HORMONE REPLACEMENT THERAPY: ICD-10-CM

## 2024-08-19 DIAGNOSIS — M35.00 SJOGREN'S SYNDROME, WITH UNSPECIFIED ORGAN INVOLVEMENT: ICD-10-CM

## 2024-08-19 DIAGNOSIS — M32.9 SYSTEMIC LUPUS ERYTHEMATOSUS, UNSPECIFIED SLE TYPE, UNSPECIFIED ORGAN INVOLVEMENT STATUS: Primary | ICD-10-CM

## 2024-08-19 DIAGNOSIS — R59.0 POSTERIOR CERVICAL LYMPHADENOPATHY: ICD-10-CM

## 2024-08-19 DIAGNOSIS — M47.817 LUMBAR AND SACRAL OSTEOARTHRITIS: ICD-10-CM

## 2024-08-19 PROCEDURE — 99215 OFFICE O/P EST HI 40 MIN: CPT | Mod: S$PBB,,, | Performed by: PHYSICIAN ASSISTANT

## 2024-08-19 PROCEDURE — 99999 PR PBB SHADOW E&M-EST. PATIENT-LVL V: CPT | Mod: PBBFAC,,, | Performed by: PHYSICIAN ASSISTANT

## 2024-08-19 PROCEDURE — 99215 OFFICE O/P EST HI 40 MIN: CPT | Mod: PBBFAC,PO | Performed by: PHYSICIAN ASSISTANT

## 2024-08-19 RX ORDER — PRIMIDONE 50 MG/1
100 TABLET ORAL 3 TIMES DAILY
COMMUNITY
Start: 2024-07-01 | End: 2025-08-08

## 2024-08-19 RX ORDER — HYDROXYCHLOROQUINE SULFATE 200 MG/1
200 TABLET, FILM COATED ORAL 2 TIMES DAILY
COMMUNITY
Start: 2023-12-28 | End: 2024-08-19 | Stop reason: SDUPTHER

## 2024-08-19 RX ORDER — CETIRIZINE HYDROCHLORIDE 10 MG/1
10 TABLET ORAL DAILY
COMMUNITY

## 2024-08-19 RX ORDER — CYANOCOBALAMIN 1000 UG/ML
1000 INJECTION, SOLUTION INTRAMUSCULAR; SUBCUTANEOUS
Qty: 30 ML | Refills: 3 | Status: SHIPPED | OUTPATIENT
Start: 2024-08-19

## 2024-08-19 RX ORDER — PANTOPRAZOLE SODIUM 40 MG/1
40 TABLET, DELAYED RELEASE ORAL DAILY
COMMUNITY

## 2024-08-19 RX ORDER — FLUTICASONE PROPIONATE 50 MCG
1 SPRAY, SUSPENSION (ML) NASAL
COMMUNITY
Start: 2023-12-11

## 2024-08-19 RX ORDER — AMANTADINE HYDROCHLORIDE 100 MG/1
100 CAPSULE, GELATIN COATED ORAL DAILY
COMMUNITY

## 2024-08-19 RX ORDER — IBUPROFEN 100 MG/5ML
1000 SUSPENSION, ORAL (FINAL DOSE FORM) ORAL DAILY
COMMUNITY

## 2024-08-19 RX ORDER — AMLODIPINE BESYLATE 5 MG/1
5 TABLET ORAL DAILY
COMMUNITY
Start: 2024-07-08 | End: 2025-08-08

## 2024-08-19 RX ORDER — HYDROXYCHLOROQUINE SULFATE 200 MG/1
200 TABLET, FILM COATED ORAL 2 TIMES DAILY
Qty: 60 TABLET | Refills: 11 | Status: SHIPPED | OUTPATIENT
Start: 2024-08-19

## 2024-08-19 RX ORDER — CYCLOBENZAPRINE HCL 10 MG
10 TABLET ORAL NIGHTLY
COMMUNITY
Start: 2023-12-28

## 2024-08-19 RX ORDER — ESCITALOPRAM OXALATE 20 MG/1
20 TABLET ORAL DAILY
Qty: 30 TABLET | Refills: 11 | Status: SHIPPED | OUTPATIENT
Start: 2024-08-19

## 2024-08-19 ASSESSMENT — ROUTINE ASSESSMENT OF PATIENT INDEX DATA (RAPID3)
MDHAQ FUNCTION SCORE: 1.7
PSYCHOLOGICAL DISTRESS SCORE: 4.4
FATIGUE SCORE: 2.2
PATIENT GLOBAL ASSESSMENT SCORE: 4
PAIN SCORE: 6
TOTAL RAPID3 SCORE: 5.22

## 2024-08-19 NOTE — PROGRESS NOTES
"  Subjective:       Patient ID: Angelica Inman is a 68 y.o. female.    Chief Complaint: Disease Management    Mrs. Inman is a 68 year old female who presents to clinic for follow up on SLE and Sjogren's syndrome.  She was last seen in clinic May 2023 and has been unable to get a follow up appointment scheduled. In the last year, she reports multiple infections including covid, flu, and sinusitis. Infections did not require hospitalization, but required prolonged recovery.     She fell in April and sustained R elbow fracture (did not require surgery) and then in May she tripped and landed on her face and sustained facial fracture. She reports unsteadiness/dizziness as well in recent months. Evaluated by Neuro told she had previous CVA. Amantadine was added for "leg pain?". PCP added primidone for tremor, which was also new in the last year.     She completed MRI [facility] last year consistent w/degenerative arthritis. Could not get a f/u response from clinic and ended up seeing Spine Ortho in [place]. Reports EMG/NCS consistent with nerve impingement and she is scheduled for MBB later this week. She is trying to obtain TENS unit. She is unable to stand for prolonged time periods due to low back pain, she has radiating pain down the L leg, and she is unable to bend forward or twist. Failed PT. Can not tolerate lyrica or gabapentin.    She has been consistent with plaquenil and feels this has helped her symptoms. Denies significant peripheral joint pain. + Fatigue. Recently starting BIPAP for sleep apnea.     She was previously prescribed xanax for PRN anxiety and needs refill.     She also complains of lymph node on the L posterior neck found by her Dentist. Swelling is painful w/palptation.     ENT added lorazepam for tinnitus with no improvement so it was d/will.    We reviewed her recent labs from PCP.    Current tx:  1. Plaquenil 200 mg bid      Review of Systems   Constitutional:  Positive for activity change and " fatigue. Negative for appetite change, chills and fever.   Eyes:  Negative for visual disturbance.   Respiratory:  Negative for cough and shortness of breath.    Cardiovascular:  Negative for chest pain, palpitations and leg swelling.   Gastrointestinal:  Negative for abdominal pain, constipation, diarrhea, nausea and vomiting.   Musculoskeletal:  Positive for arthralgias, back pain, gait problem, joint swelling, myalgias and neck pain.   Allergic/Immunologic: Negative for immunocompromised state.   Neurological:  Negative for dizziness, weakness, light-headedness and headaches.   Psychiatric/Behavioral:  Negative for dysphoric mood. The patient is not nervous/anxious.          Objective:     Vitals:    08/19/24 0854   BP: 131/80   Pulse: 69       Past Medical History:   Diagnosis Date    Acid reflux     Anxiety     Arthritis     Degenerative disc disease     Depression     Dry eyes     Dry mouth     Hypertension      Past Surgical History:   Procedure Laterality Date    APPENDECTOMY      HYSTERECTOMY      LIPOMA RESECTION      c spine area    THORACIC DISCECTOMY      disc rupture    TONSILLECTOMY            Physical Exam   Constitutional: She is oriented to person, place, and time.   Eyes: Right eye exhibits normal extraocular motion. Left eye exhibits normal extraocular motion.   Cardiovascular: Normal rate and regular rhythm. Exam reveals no decreased pulses.   Pulmonary/Chest: Effort normal.   Musculoskeletal:      Right shoulder: Tenderness present.      Left shoulder: Tenderness present.      Right elbow: Swelling present. Tenderness present.      Left elbow: Normal.      Right wrist: Swelling and tenderness present.      Left wrist: Normal.      Right knee: Normal.      Left knee: Normal.   Lymphadenopathy:     She has cervical adenopathy.        Left cervical: Posterior cervical adenopathy present.   Neurological: She is alert and oriented to person, place, and time. Gait normal.   Skin: No rash noted.    Psychiatric: Mood and affect normal.       Right Side Rheumatological Exam     Examination finds the knee normal.    The patient is tender to palpation of the shoulder, elbow, wrist, 1st PIP, 1st MCP, 2nd PIP, 2nd MCP, 3rd PIP, 3rd MCP, 4th PIP, 4th MCP, 5th PIP and 5th MCP    She has swelling of the elbow and wrist    The patient has an enlarged 2nd DIP, 3rd DIP, 4th DIP and 5th DIP    Left Side Rheumatological Exam     Examination finds the elbow, wrist and knee normal.    The patient is tender to palpation of the shoulder, 1st PIP, 1st MCP, 2nd PIP, 2nd MCP, 3rd PIP, 3rd MCP, 4th PIP, 4th MCP, 5th PIP and 5th MCP.      Back/Neck Exam   Tenderness Right paramedian tenderness of the Upper L-Spine, Lower L-Spine, Upper C-Spine and Lower C-Spine.Left paramedian tenderness of the Lower L-Spine, Upper L-Spine, Upper C-Spine and Lower C-Spine.       Component      Latest Ref Rng 7/11/2024   TSH      0.450 - 5.330 uIU/mL 4.883 (E)   Hemoglobin A1C External      4.0 - 6.0 % 5.7 (E)      Impression Carotid US:    Carotid disease classification by spectral analysis showing less than 50%   arterial occlusive disease right and left neck.     Conclusion MRI Brain:     1. No acute intracranial process.     2. Remote subcortical infarct noted within the left caudate head.       Assessment:       1. Systemic lupus erythematosus, unspecified SLE type, unspecified organ involvement status    2. Sjogren's syndrome, with unspecified organ involvement    3. Lumbar and sacral osteoarthritis    4. Anxiety    5. Posterior cervical lymphadenopathy    6. History of CVA (cerebrovascular accident)            Plan:       Systemic lupus erythematosus, unspecified SLE type, unspecified organ involvement status  -     hydroxychloroquine (PLAQUENIL) 200 mg tablet; Take 1 tablet (200 mg total) by mouth 2 (two) times daily.  Dispense: 60 tablet; Refill: 11  -     cyanocobalamin 1,000 mcg/mL injection; Inject 1 mL (1,000 mcg total) into the skin  every 7 days.  Dispense: 30 mL; Refill: 3  -     CBC Auto Differential; Future; Expected date: 08/19/2024  -     Comprehensive Metabolic Panel; Future; Expected date: 08/19/2024  -     C-Reactive Protein; Future; Expected date: 08/19/2024  -     Sedimentation Rate; Future; Expected date: 08/19/2024  -     Sjogrens syndrome-A extractable nuclear antibody; Future; Expected date: 08/19/2024  -     Sjogrens syndrome-B extractable nuclear antibody; Future; Expected date: 08/19/2024  -     MARTI Screen w/Reflex; Future; Expected date: 08/19/2024  -     Anti-DNA Ab, Double-Stranded; Future; Expected date: 08/19/2024  -     Anti-Histone Antibody; Future; Expected date: 08/19/2024  -     IgG 1, 2, 3, and 4; Future; Expected date: 08/19/2024  -     IgM; Future; Expected date: 08/19/2024  -     IgG; Future; Expected date: 08/19/2024  -     IgA; Future; Expected date: 08/19/2024  -     Cardiolipin antibody; Future; Expected date: 08/19/2024  -     Beta-2 Glycoprotein Abs (IgA, IgG, IgM); Future; Expected date: 08/19/2024  -     DRVVT; Future; Expected date: 08/19/2024  -     Protein / creatinine ratio, urine; Future; Expected date: 08/19/2024    Sjogren's syndrome, with unspecified organ involvement  -     hydroxychloroquine (PLAQUENIL) 200 mg tablet; Take 1 tablet (200 mg total) by mouth 2 (two) times daily.  Dispense: 60 tablet; Refill: 11  -     cyanocobalamin 1,000 mcg/mL injection; Inject 1 mL (1,000 mcg total) into the skin every 7 days.  Dispense: 30 mL; Refill: 3  -     CBC Auto Differential; Future; Expected date: 08/19/2024  -     Comprehensive Metabolic Panel; Future; Expected date: 08/19/2024  -     C-Reactive Protein; Future; Expected date: 08/19/2024  -     Sedimentation Rate; Future; Expected date: 08/19/2024  -     Sjogrens syndrome-A extractable nuclear antibody; Future; Expected date: 08/19/2024  -     Sjogrens syndrome-B extractable nuclear antibody; Future; Expected date: 08/19/2024  -     MARTI Screen w/Reflex;  Future; Expected date: 08/19/2024  -     Anti-DNA Ab, Double-Stranded; Future; Expected date: 08/19/2024  -     Anti-Histone Antibody; Future; Expected date: 08/19/2024  -     IgG 1, 2, 3, and 4; Future; Expected date: 08/19/2024  -     IgM; Future; Expected date: 08/19/2024  -     IgG; Future; Expected date: 08/19/2024  -     IgA; Future; Expected date: 08/19/2024  -     Cardiolipin antibody; Future; Expected date: 08/19/2024  -     Beta-2 Glycoprotein Abs (IgA, IgG, IgM); Future; Expected date: 08/19/2024  -     DRVVT; Future; Expected date: 08/19/2024  -     Protein / creatinine ratio, urine; Future; Expected date: 08/19/2024    Lumbar and sacral osteoarthritis    Anxiety  -     EScitalopram oxalate (LEXAPRO) 20 MG tablet; Take 1 tablet (20 mg total) by mouth once daily.  Dispense: 30 tablet; Refill: 11    Posterior cervical lymphadenopathy  -     US Soft Tissue Head Neck; Future; Expected date: 08/19/2024    History of CVA (cerebrovascular accident)          SLE, Sjogren's syndrome on plaquenil  --fibromyalgia  --lumbar and sacral arthritis  --anxiety    Check labs  Restart toradol/depo prn after spinal injections completed  Cont plaquenil 200 mg bid  Cont lexapro 20 mg daily. Pend xanax prn for Dr. Moctezuma, khris pended for MD as well.  US neck to eval LAD  Follow up with pain management for spine arthritis as it is not related to SLE/sjogren's    Follow up:  4 months Dr. Moctezuma

## 2024-08-20 RX ORDER — ESTRADIOL 1 MG/1
1 TABLET ORAL DAILY
Qty: 90 TABLET | Refills: 1 | Status: SHIPPED | OUTPATIENT
Start: 2024-08-20

## 2024-08-20 RX ORDER — ALPRAZOLAM 0.25 MG/1
0.25 TABLET ORAL 4 TIMES DAILY PRN
Qty: 28 TABLET | Refills: 2 | Status: SHIPPED | OUTPATIENT
Start: 2024-08-20 | End: 2024-09-19

## 2024-08-21 RX ORDER — VALSARTAN 320 MG/1
320 TABLET ORAL
Qty: 90 TABLET | Refills: 3 | Status: SHIPPED | OUTPATIENT
Start: 2024-08-21

## 2024-08-28 DIAGNOSIS — M32.9 SLE (SYSTEMIC LUPUS ERYTHEMATOSUS RELATED SYNDROME): ICD-10-CM

## 2024-08-28 DIAGNOSIS — G89.4 CHRONIC PAIN SYNDROME: ICD-10-CM

## 2024-08-28 RX ORDER — METHYLPREDNISOLONE ACETATE 80 MG/ML
80 INJECTION, SUSPENSION INTRA-ARTICULAR; INTRALESIONAL; INTRAMUSCULAR; SOFT TISSUE DAILY
Qty: 1 ML | Refills: 3 | Status: SHIPPED | OUTPATIENT
Start: 2024-08-28 | End: 2024-08-31

## 2024-09-26 ENCOUNTER — TELEPHONE (OUTPATIENT)
Dept: RHEUMATOLOGY | Facility: CLINIC | Age: 69
End: 2024-09-26
Payer: MEDICARE

## 2024-09-26 NOTE — TELEPHONE ENCOUNTER
----- Message from Niesha Spicer sent at 9/26/2024 10:31 AM CDT -----  Contact[facility]  Type:  Pharmacy Calling to Clarify an RX    [facility]    Prescription Name: Depo-medrol injection 80 mg    What do they need to clarify?: dosage vs instructions    Best Call Back Number: [phone]    Additional Information:  please call to advise    Thanks    Fax# [phone]    Thanks\

## 2024-10-01 NOTE — TELEPHONE ENCOUNTER
Returned call and informed DepoMedrol was not ordered to be administered at home. Previous script from  appears to be .

## 2024-10-28 ENCOUNTER — PATIENT MESSAGE (OUTPATIENT)
Dept: GASTROENTEROLOGY | Facility: CLINIC | Age: 69
End: 2024-10-28
Payer: MEDICARE

## 2024-11-21 ENCOUNTER — TELEPHONE (OUTPATIENT)
Dept: RHEUMATOLOGY | Facility: CLINIC | Age: 69
End: 2024-11-21
Payer: MEDICARE

## 2024-11-21 NOTE — TELEPHONE ENCOUNTER
----- Message from Cherelle sent at 11/21/2024  1:56 PM CST -----  Regarding: advise  Contact: [facility] lab  Type: Needs Medical Advice  Who Called:  [facility] lab   Symptoms (please be specific):    How long has patient had these symptoms:    Pharmacy name and phone #:    Best Call Back Number: [phone]  Additional Information: they do not do anti hyistone antibody call to advise you

## 2024-11-21 NOTE — TELEPHONE ENCOUNTER
Called lab back and no one knew who called and why. I asked if there was a problem with lab orders per call I received and tech was not able to clarify of why they called due to no notes on file.

## 2024-12-17 ENCOUNTER — OFFICE VISIT (OUTPATIENT)
Dept: RHEUMATOLOGY | Facility: CLINIC | Age: 69
End: 2024-12-17
Payer: MEDICARE

## 2024-12-17 VITALS
HEART RATE: 55 BPM | SYSTOLIC BLOOD PRESSURE: 128 MMHG | HEIGHT: 65 IN | BODY MASS INDEX: 36.33 KG/M2 | DIASTOLIC BLOOD PRESSURE: 83 MMHG | WEIGHT: 218.06 LBS

## 2024-12-17 DIAGNOSIS — M47.817 LUMBAR AND SACRAL OSTEOARTHRITIS: ICD-10-CM

## 2024-12-17 DIAGNOSIS — I10 ESSENTIAL HYPERTENSION: ICD-10-CM

## 2024-12-17 DIAGNOSIS — M35.00 SJOGREN'S SYNDROME, WITH UNSPECIFIED ORGAN INVOLVEMENT: ICD-10-CM

## 2024-12-17 DIAGNOSIS — G89.4 CHRONIC PAIN SYNDROME: ICD-10-CM

## 2024-12-17 DIAGNOSIS — M79.7 FIBROMYALGIA: ICD-10-CM

## 2024-12-17 DIAGNOSIS — M35.1 MIXED CONNECTIVE TISSUE DISEASE: ICD-10-CM

## 2024-12-17 DIAGNOSIS — F41.9 ANXIETY: ICD-10-CM

## 2024-12-17 DIAGNOSIS — R42 DIZZINESS: ICD-10-CM

## 2024-12-17 DIAGNOSIS — E66.01 OBESITY, MORBID: ICD-10-CM

## 2024-12-17 DIAGNOSIS — M32.9 SYSTEMIC LUPUS ERYTHEMATOSUS, UNSPECIFIED SLE TYPE, UNSPECIFIED ORGAN INVOLVEMENT STATUS: ICD-10-CM

## 2024-12-17 DIAGNOSIS — R09.81 SINUS CONGESTION: ICD-10-CM

## 2024-12-17 DIAGNOSIS — M32.9 SLE (SYSTEMIC LUPUS ERYTHEMATOSUS RELATED SYNDROME): Primary | ICD-10-CM

## 2024-12-17 PROCEDURE — 99214 OFFICE O/P EST MOD 30 MIN: CPT | Mod: PBBFAC,PO | Performed by: INTERNAL MEDICINE

## 2024-12-17 PROCEDURE — 99999 PR PBB SHADOW E&M-EST. PATIENT-LVL IV: CPT | Mod: PBBFAC,,, | Performed by: INTERNAL MEDICINE

## 2024-12-17 PROCEDURE — 99215 OFFICE O/P EST HI 40 MIN: CPT | Mod: S$PBB,,, | Performed by: INTERNAL MEDICINE

## 2024-12-17 RX ORDER — KETOROLAC TROMETHAMINE 30 MG/ML
60 INJECTION, SOLUTION INTRAMUSCULAR; INTRAVENOUS
Qty: 2 ML | Refills: 1 | Status: SHIPPED | OUTPATIENT
Start: 2024-12-17 | End: 2025-12-17

## 2024-12-17 RX ORDER — ALPRAZOLAM 0.25 MG/1
0.25 TABLET ORAL 4 TIMES DAILY PRN
Qty: 28 TABLET | Refills: 1 | Status: SHIPPED | OUTPATIENT
Start: 2024-12-17 | End: 2025-01-16

## 2024-12-17 RX ORDER — DICLOFENAC SODIUM 50 MG/1
50 TABLET, DELAYED RELEASE ORAL 2 TIMES DAILY PRN
COMMUNITY
Start: 2024-09-19

## 2024-12-17 ASSESSMENT — ROUTINE ASSESSMENT OF PATIENT INDEX DATA (RAPID3)
PATIENT GLOBAL ASSESSMENT SCORE: 5
MDHAQ FUNCTION SCORE: 0.7
FATIGUE SCORE: 1.1
PAIN SCORE: 9
TOTAL RAPID3 SCORE: 5.44
PSYCHOLOGICAL DISTRESS SCORE: 2.2

## 2024-12-17 NOTE — PROGRESS NOTES
"  Subjective:       Patient ID: Angelica Inman is a 69 y.o. female.    Chief Complaint: Disease Management    Mrs. Inman is a 69year old female who presents to clinic for follow up on SLE and Sjogren's syndrome.  She was last seen in clinic 8/2024 With Damari Vang. I am seeing patient for the first time for Dr. Moctezuma due to illness and verify medication refills for this her.  Lupus activity labs were just ordered as of 11/21/2024 patient's double-stranded DNA is negative.  Her sedimentation and C-reactive protein were within normal limits.  Cardiolipin antibody, beta 2 glycoprotein, and lupus anticoagulant are negative.  Her MARTI was done at [facility] as a [place] that is positive at 1.9 but we have no titer were pattern for this.    ALT marginally elevated AST normal her GFR and creatinine are within normal limits.  Patient is without leukopenia thrombocytopenia or anemia.  She is working with Spine ortho in [place]- pending NAS soon, TENS will arrive this week.   She appears to be unclear if she is taking amlodipine still.   She notes pain in all her joints every day but no specific swelling of any joints.                                                      She is on hydroxychloroquine 200 mg b.i.d..  She is on a myriad of other drugs some filled by Dr. Moctezuma I will review these with her    I am keeping this in the record Ms Vang:  She fell in April and sustained R elbow fracture (did not require surgery) and then in May she tripped and landed on her face and sustained facial fracture. She reports unsteadiness/dizziness as well in recent months. Evaluated by Neuro told she had previous CVA. Amantadine was added for "leg pain?". PCP added primidone for tremor, which was also new in the last year.     She completed MRI [facility] last year consistent w/degenerative arthritis. Could not get a f/u response from clinic and ended up seeing Spine Ortho in [place]. Reports EMG/NCS consistent with nerve impingement and she is " scheduled for MBB later this week. She is trying to obtain TENS unit. She is unable to stand for prolonged time periods due to low back pain, she has radiating pain down the L leg, and she is unable to bend forward or twist. Failed PT. Can not tolerate lyrica or gabapentin.    She has been consistent with plaquenil and feels this has helped her symptoms. Denies significant peripheral joint pain. + Fatigue. Recently starting BIPAP for sleep apnea.     She was previously prescribed xanax for PRN anxiety and needs refill.     She also complains of lymph node on the L posterior neck found by her Dentist. Swelling is painful w/palptation.     ENT added lorazepam for tinnitus with no improvement so it was d/will.    We reviewed her recent labs from PCP.    Current tx:  1. Plaquenil 200 mg bid            She complains of joint swelling. Associated symptoms include fatigue and myalgias. Pertinent negatives include no fever or headaches.         Review of Systems   Constitutional:  Positive for activity change and fatigue. Negative for appetite change, chills and fever.   Eyes:  Negative for visual disturbance.   Respiratory:  Negative for cough and shortness of breath.    Cardiovascular:  Negative for chest pain, palpitations and leg swelling.   Gastrointestinal:  Negative for abdominal pain, constipation, diarrhea, nausea and vomiting.   Musculoskeletal:  Positive for arthralgias, back pain, gait problem, joint swelling, myalgias and neck pain.   Allergic/Immunologic: Negative for immunocompromised state.   Neurological:  Negative for dizziness, weakness, light-headedness and headaches.   Psychiatric/Behavioral:  Negative for dysphoric mood. The patient is not nervous/anxious.          Objective:     Vitals:    12/17/24 1027   BP: 128/83   Pulse: (!) 55       Past Medical History:   Diagnosis Date    Acid reflux     Anxiety     Arthritis     Degenerative disc disease     Depression     Dry eyes     Dry mouth      Hypertension      Past Surgical History:   Procedure Laterality Date    APPENDECTOMY      HYSTERECTOMY      LIPOMA RESECTION      c spine area    THORACIC DISCECTOMY      disc rupture    TONSILLECTOMY            Physical Exam   Constitutional: She is oriented to person, place, and time.   Eyes: Right eye exhibits normal extraocular motion. Left eye exhibits normal extraocular motion.   Cardiovascular: Normal rate and regular rhythm. Exam reveals no decreased pulses.   Pulmonary/Chest: Effort normal.   Musculoskeletal:      Right shoulder: Tenderness present.      Left shoulder: Tenderness present.      Right elbow: Swelling present. Tenderness present.      Left elbow: Normal.      Right wrist: Swelling and tenderness present.      Left wrist: Normal.      Right knee: Normal.      Left knee: Normal.   Lymphadenopathy:     She has cervical adenopathy.        Left cervical: Posterior cervical adenopathy present.   Neurological: She is alert and oriented to person, place, and time. Gait normal.   Skin: No rash noted.   Psychiatric: Mood and affect normal.       Right Side Rheumatological Exam     Examination finds the knee normal.    The patient is tender to palpation of the shoulder, elbow, wrist, 1st PIP, 1st MCP, 2nd PIP, 2nd MCP, 3rd PIP, 3rd MCP, 4th PIP, 4th MCP, 5th PIP and 5th MCP    She has swelling of the elbow and wrist    The patient has an enlarged 2nd DIP, 3rd DIP, 4th DIP and 5th DIP    Left Side Rheumatological Exam     Examination finds the elbow, wrist and knee normal.    The patient is tender to palpation of the shoulder, 1st PIP, 1st MCP, 2nd PIP, 2nd MCP, 3rd PIP, 3rd MCP, 4th PIP, 4th MCP, 5th PIP and 5th MCP.      Back/Neck Exam   Tenderness Right paramedian tenderness of the Upper L-Spine, Lower L-Spine, Upper C-Spine and Lower C-Spine.Left paramedian tenderness of the Lower L-Spine, Upper L-Spine, Upper C-Spine and Lower C-Spine.     Component      Latest Ref Rng 7/11/2024   TSH      0.450 -  5.330 uIU/mL 4.883 (E)   Hemoglobin A1C External      4.0 - 6.0 % 5.7 (E)      Impression Carotid US:    Carotid disease classification by spectral analysis showing less than 50%   arterial occlusive disease right and left neck.     Conclusion MRI Brain:     1. No acute intracranial process.     2. Remote subcortical infarct noted within the left caudate head.       Assessment:       1. SLE (systemic lupus erythematosus related syndrome)    2. Sjogren's syndrome, with unspecified organ involvement    3. Fibromyalgia            Plan:       SLE (systemic lupus erythematosus related syndrome)    Sjogren's syndrome, with unspecified organ involvement    Fibromyalgia          SLE, Sjogren's syndrome on plaquenil  --fibromyalgia  --lumbar and sacral arthritis  --anxiety    Check labs  Restart toradol/depo prn after spinal injections completed  Cont plaquenil 200 mg bid  Cont lexapro 20 mg daily. Pend xanax prn for khris Burger pended for MD as well.    Follow up with pain management for spine arthritis as it is not related to SLE/sjogren's  Patient last script     Follow up:  4 months Dr. Moctezuma

## 2024-12-24 ENCOUNTER — PATIENT MESSAGE (OUTPATIENT)
Dept: PHARMACY | Facility: CLINIC | Age: 69
End: 2024-12-24
Payer: MEDICARE

## 2025-03-03 RX ORDER — KETOROLAC TROMETHAMINE 30 MG/ML
60 INJECTION, SOLUTION INTRAMUSCULAR; INTRAVENOUS
Qty: 2 ML | Refills: 1 | Status: SHIPPED | OUTPATIENT
Start: 2025-03-03 | End: 2026-03-03

## 2025-04-28 ENCOUNTER — OFFICE VISIT (OUTPATIENT)
Dept: RHEUMATOLOGY | Facility: CLINIC | Age: 70
End: 2025-04-28
Payer: MEDICARE

## 2025-04-28 VITALS
SYSTOLIC BLOOD PRESSURE: 127 MMHG | HEART RATE: 71 BPM | BODY MASS INDEX: 36.62 KG/M2 | HEIGHT: 65 IN | DIASTOLIC BLOOD PRESSURE: 79 MMHG | WEIGHT: 219.81 LBS

## 2025-04-28 DIAGNOSIS — M32.9 SYSTEMIC LUPUS ERYTHEMATOSUS, UNSPECIFIED SLE TYPE, UNSPECIFIED ORGAN INVOLVEMENT STATUS: ICD-10-CM

## 2025-04-28 DIAGNOSIS — E66.01 OBESITY, MORBID: ICD-10-CM

## 2025-04-28 DIAGNOSIS — I27.20 PULMONARY HTN: Primary | ICD-10-CM

## 2025-04-28 DIAGNOSIS — M32.9 SLE (SYSTEMIC LUPUS ERYTHEMATOSUS RELATED SYNDROME): ICD-10-CM

## 2025-04-28 PROCEDURE — 99999 PR PBB SHADOW E&M-EST. PATIENT-LVL V: CPT | Mod: PBBFAC,,, | Performed by: INTERNAL MEDICINE

## 2025-04-28 PROCEDURE — 99215 OFFICE O/P EST HI 40 MIN: CPT | Mod: S$PBB,,, | Performed by: INTERNAL MEDICINE

## 2025-04-28 PROCEDURE — 99215 OFFICE O/P EST HI 40 MIN: CPT | Mod: PBBFAC,PO | Performed by: INTERNAL MEDICINE

## 2025-04-28 RX ORDER — TIRZEPATIDE 12.5 MG/.5ML
12.5 INJECTION, SOLUTION SUBCUTANEOUS
Qty: 2 ML | Refills: 5 | Status: SHIPPED | OUTPATIENT
Start: 2025-08-22

## 2025-04-28 RX ORDER — TIRZEPATIDE 10 MG/.5ML
10 INJECTION, SOLUTION SUBCUTANEOUS
Qty: 2 ML | Refills: 6 | Status: SHIPPED | OUTPATIENT
Start: 2025-07-24

## 2025-04-28 RX ORDER — TIRZEPATIDE 15 MG/.5ML
15 INJECTION, SOLUTION SUBCUTANEOUS
Qty: 2 ML | Refills: 6 | Status: SHIPPED | OUTPATIENT
Start: 2025-09-20

## 2025-04-28 RX ORDER — TIRZEPATIDE 2.5 MG/.5ML
2.5 INJECTION, SOLUTION SUBCUTANEOUS
Qty: 2 ML | Refills: 5 | Status: SHIPPED | OUTPATIENT
Start: 2025-04-28

## 2025-04-28 RX ORDER — TIRZEPATIDE 5 MG/.5ML
5 INJECTION, SOLUTION SUBCUTANEOUS
Qty: 2 ML | Refills: 6 | Status: SHIPPED | OUTPATIENT
Start: 2025-05-27

## 2025-04-28 RX ORDER — TIRZEPATIDE 7.5 MG/.5ML
7.5 INJECTION, SOLUTION SUBCUTANEOUS
Qty: 2 ML | Refills: 5 | Status: SHIPPED | OUTPATIENT
Start: 2025-06-25

## 2025-04-28 RX ORDER — SILDENAFIL CITRATE 20 MG/1
20 TABLET ORAL 3 TIMES DAILY
Qty: 90 TABLET | Refills: 11 | Status: ACTIVE | OUTPATIENT
Start: 2025-04-28 | End: 2026-04-28

## 2025-04-28 ASSESSMENT — ROUTINE ASSESSMENT OF PATIENT INDEX DATA (RAPID3)
PATIENT GLOBAL ASSESSMENT SCORE: 8
FATIGUE SCORE: 2.2
PAIN SCORE: 4
PSYCHOLOGICAL DISTRESS SCORE: 2.2
MDHAQ FUNCTION SCORE: 1.1
TOTAL RAPID3 SCORE: 5.22

## 2025-04-28 NOTE — PROGRESS NOTES
Subjective:     Patient ID:  Angelica Inman    Chief Complaint:  Disease Management     History of Present Illness:  Follow up:pt is a 70 yo that just recently got dx w/ sleep apnea; she is now on 2 L nasal cannula continuously.  She had an echo done which demonstrated pulmonary hypertension.  She is awaiting her cardiology visit she has seen Pulmonary  Lupus activity labs were just ordered as of 04/21/25. patient's double-stranded DNA is negative.  Her sedimentation and C-reactive protein were within normal limits.  Cardiolipin antibody, beta 2 glycoprotein, and lupus anticoagulant are negative.  Her MARTI was done at [facility] that is positive at 1.9 but we have no titer were pattern for this.she saw pulmonary said her lung dz, she  had a tia  put on plavix. Saw pulmonary dx w/ pulmonary htn start plaquenil 2009.     Patient is without leukopenia thrombocytopenia or anemia.                                                        She is on hydroxychloroquine 200 mg b.i.d..     She is ob Bipap has helped her symptoms. Denies significant peripheral joint pain. + Fatigue. Recently starting BIPAP for sleep apnea.      We reviewed her recent labs from PCP.     Current tx:  1. Plaquenil 200 mg bid           Rheumatologic History:   - Diagnosis/es:  - Positive serologies:  - Infectious screening labs:  - Previous Treatments:  - Current Treatments:     Interval History:   Hospitalization since last office visit: No    Patient Active Problem List    Diagnosis Date Noted    Obesity, morbid 12/17/2024    SLE (systemic lupus erythematosus related syndrome) 07/18/2017    Sjogren's syndrome 07/18/2017    Lupus 11/17/2014    Fibromyalgia 11/17/2014    Lumbar and sacral osteoarthritis 11/17/2014     Past Surgical History:   Procedure Laterality Date    APPENDECTOMY      HYSTERECTOMY      LIPOMA RESECTION      c spine area    THORACIC DISCECTOMY      disc rupture    TONSILLECTOMY       Social History[1]  No family history on  file.  Review of patient's allergies indicates:   Allergen Reactions    Meloxicam Other (See Comments) and Swelling    Benazepril Other (See Comments)     cough    Adhesive tape-silicones Rash    Gabapentin Other (See Comments)     dizziness    Gentamicin Itching and Other (See Comments)     Gentamicin 0.3%  opth. Solution-irritation    Latex Other (See Comments) and Rash    Oxycodone-acetaminophen Itching, Other (See Comments) and Rash       Review of Systems   Review of Systems   Constitutional:  Positive for chills and fatigue. Negative for activity change, appetite change, diaphoresis, fever and unexpected weight change.   HENT:  Negative for congestion, dental problem, ear discharge, ear pain, facial swelling, mouth sores, nosebleeds, postnasal drip, rhinorrhea, sinus pressure, sneezing, sore throat, tinnitus, trouble swallowing and voice change.    Eyes:  Negative for photophobia, pain, discharge, redness and itching.   Respiratory:  Positive for cough. Negative for apnea, chest tightness, shortness of breath and wheezing.    Cardiovascular:  Positive for leg swelling. Negative for chest pain and palpitations.   Gastrointestinal:  Positive for abdominal pain. Negative for abdominal distention, constipation, diarrhea, nausea and vomiting.   Endocrine: Negative for cold intolerance, heat intolerance, polydipsia and polyuria.   Genitourinary:  Negative for decreased urine volume, difficulty urinating, dysuria, flank pain, frequency, hematuria and urgency.   Musculoskeletal:  Positive for arthralgias, back pain, gait problem, joint swelling, myalgias, neck pain and neck stiffness.   Skin:  Negative for pallor, rash and wound.   Allergic/Immunologic: Positive for immunocompromised state.   Neurological:  Negative for dizziness, tremors, numbness and headaches.   Hematological:  Negative for adenopathy. Does not bruise/bleed easily.   Psychiatric/Behavioral:  Negative for sleep disturbance. The patient is not  "nervous/anxious.         Current Medications:  Current Outpatient Medications   Medication Instructions    ALPRAZolam (XANAX) 0.25 mg, Oral, 4 times daily PRN    amantadine HCL (SYMMETREL) 100 mg, Daily    amLODIPine (NORVASC) 5 mg, Daily    ascorbic acid (vitamin C) (VITAMIN C) 1,000 mg, Daily    aspirin 81 mg, Daily    biotin 10,000 mcg, Daily    cetirizine (ZYRTEC) 10 mg, Daily    cyanocobalamin 1,000 mcg, Subcutaneous, Every 7 days    cyclobenzaprine (FLEXERIL) 10 mg, Nightly    diclofenac (VOLTAREN) 50 mg, 2 times daily PRN    EScitalopram oxalate (LEXAPRO) 20 mg, Oral, Daily    estradioL (ESTRACE) 1 mg, Oral, Daily    fluconazole (DIFLUCAN) 150 mg, Oral, Daily    fluticasone propionate (FLONASE) 50 mcg/actuation nasal spray 1 spray    hydroCHLOROthiazide (HYDRODIURIL) 25 mg, Oral, Daily    hydroxychloroquine (PLAQUENIL) 200 mg, Oral, 2 times daily    ketorolac (TORADOL) 60 mg, Intramuscular, Every 30 days    metoprolol tartrate (LOPRESSOR) 100 mg, Oral, 2 times daily    milk thistle 140 mg Cap 1 tablet, Daily    MULTIVITAMIN/IRON/FOLIC ACID (CENTRUM COMPLETE ORAL) 1 tablet, Daily    mv-mn/iron/folic acid/herb 190 (VITAMIN D3 COMPLETE ORAL) Daily    pantoprazole (PROTONIX) 40 mg, Daily    potassium chloride SA (KLOR-CON M10) 10 MEQ tablet 10 mEq, Oral, Daily    primidone (MYSOLINE) 100 mg, 3 times daily    sildenafil (REVATIO) 20 mg, Oral, 3 times daily    valsartan (DIOVAN) 320 mg, Oral    ZEPBOUND 2.5 mg, Subcutaneous, Every 7 days    [START ON 5/27/2025] ZEPBOUND 5 mg, Subcutaneous, Every 7 days    [START ON 6/25/2025] ZEPBOUND 7.5 mg, Subcutaneous, Every 7 days    [START ON 7/24/2025] ZEPBOUND 10 mg, Subcutaneous, Every 7 days    [START ON 8/22/2025] ZEPBOUND 12.5 mg, Subcutaneous, Every 7 days    [START ON 9/20/2025] ZEPBOUND 15 mg, Subcutaneous, Every 7 days         Objective:     Vitals:    04/28/25 1116   BP: 127/79   Pulse: 71   Weight: 99.7 kg (219 lb 12.8 oz)   Height: 5' 5" (1.651 m)   PainSc:   4 " "     Body mass index is 36.58 kg/m².     Physical Examinations:  Physical Exam     Disease Assessment Scores:  Patient's Global Assessment of arthritis (0-10): 1  Physician's Global Assessment of arthritis (0-10): 1  Number of Tender Joints (0-28): 1  Number of Swollen Joints (0-28): 2        10/10/2023    10:57 AM   Rapid3 Question Responses and Scores   MDHAQ Score 1   Psychologic Score 2.2   Pain Score 8.5   When you awakened in the morning OVER THE LAST WEEK, did you feel stiff? Yes   If Yes, please indicate the number of hours until you are as limber as you will be for the day 2   Fatigue Score 10   Global Health Score 8   RAPID3 Score 6.61       Monitoring Lab Results:  No results found for: "WBC", "RBC", "HGB", "HCT", "MCV", "MCH", "MCHC", "RDW", "PLT"     Lab Results   Component Value Date     08/17/2023    K 3.6 08/17/2023     08/17/2023    CO2 31 08/17/2023    BUN 12 08/17/2023    CREATININE 0.68 04/24/2024    CALCIUM 9.4 08/17/2023    ANIONGAP 7 08/17/2023      suggestion  Result Information displayed in this report will not trend and may not trigger automated decision support.      Ref Range & Units 7 d ago   Creatinine 0.60 - 1.30 mg/dL 0.78   Blood Urea Nitrogen 7 - 25 mg/dL 13   Sodium 136 - 145 mmol/L 136   Potassium 3.5 - 5.1 mmol/L 3.4 Low    Chloride 98 - 107 mmol/L 94 Low    Carbon Dioxide 21 - 31 mmol/L 35 High    Glucose Screen 74 - 109 mg/dL 85   Calcium 8.6 - 10.3 mg/dL 10   Comment: Gadolinium based MRI contrast media may cause spurious calcium measurements.   Protein Total 6.4 - 8.9 g/dL 7   Albumin Level 3.5 - 5.7 g/dl 4.1   Bilirubin Total 0.3 - 1.0 mg/dL 0.5   Comment: Eltrombopag is known to cause invalid(falsely elevated) Bilirubin results.   Alkaline Phosphatase Level 34 - 104 U/L 66   AST 13 - 39 U/L 49 High    ALT 7 - 52 U/L 66 High    Anion Gap 5 - 15 mmol/L 7   eGFR African American 61 - 120 mL/min/1.73mSq 83   Comment: The clinician must determine the suitability of " an eGFR result for a patient's condition.  The eGFR should NOT be used for ill, hospitalized patients, for people with near normal kidney function, the elderly (over 70 years), the young (less than 18 yrs), pregnant women, patients with serious comorbid conditions, or patients with extreme body size, muscle mass, or nutritional status.     Specimen Collected: 04/21/25 13:00    Performed by: [facility] LABORATORY Last Resulted: 04/21/25 13:27   Received From: [facility] and Its Subsidiaries and Affiliates  Result Received: 04/28/25 10:48    View Encounter        Received Information   Contains abnormal data C-REACTIVE PROTEIN  Order: [id number]      Component  Ref Range & Units (hover) 7 d ago   CRP QUANTITATIVE 6.3 High              Specimen Collected: 04/21/25 13:00 CDT    Performed By: [facility] LABORATORY Last Resulted: 04/21/25 13:27 CDT   Received From: [facility] and Its Subsidiaries and Affiliates  Result Received: 04/28/25 10:48       Received Information     View Full Report  Complement C4, Serum  Order: [id number]   suggestion  Information displayed in this report may not trend or trigger automated decision support.      Ref Range & Units 7 d ago   Complement C4, S 14 - 40 mg/dL 36   Comment:  Test Performed by:  [place] [address]  : Bhargav Bejarano Ph.D.; CLIA# [id number]     Specimen Collected: 04/21/25 13:00    Performed by: [facility] Last Resulted: 04/23/25 17:48   Received From: [facility] and Its Subsidiaries and Affiliates  Result Received: 04/28/25 10:48   Complement C3, Serum  Order: [id number]   suggestion  Information displayed in this report may not trend or trigger automated decision support.      Ref Range & Units 7 d ago    Complement C3, S 75 - 175 mg/dL 150   Comment:  Test Performed by:  [facility] [place] [address]  : Bhargav Bejarano Ph.D.; CLIA# [id number]     Specimen Collected: 04/21/25 13:00    Performed by: [facility] Last Resulted: 04/23/25 18:02   Received From: [facility] and Its Subsidiaries and Affiliates  Result Received: 04/28/25 10:48    View Encounter        Received Information  DNA DS Ab, IgG, S  Order: [id number]   suggestion  Information displayed in this report may not trend or trigger automated decision support.      Ref Range & Units 7 d ago   DNA Double-Stranded Ab, IgG, S 0 - 99 IU/mL <10   Comment: Interpretation: Negative (<100)  Negative for anti-dsDNA IgG antibodies. Results do not rule  out a diagnosis of systemic lupus erythematosus (SLE).  Consider testing for anti-dsDNA IgG using Crithidia  luciliae by indirect immunofluorescence, if  clinically indicated.    Test Performed by:  [facility] [place] [address]  : Bhargav Bejarano Ph.D.; CLIA# [id number]     Specimen Collected: 04/21/25 13:00    Performed by: [facility] Last Resulted: 04/24/25 11:29   [facility]  Result Received: 04/28/25 10:48    View Encounter        Received Information  Sedimentation rate, manual  Order: [id number]   suggestion  Information displayed in this report may not trend or trigger automated decision support.      Ref Range & Units 7 d ago   Rate 0 - 15 mm/hr 12     Specimen Collected: 04/21/25 13:00    Performed by: [facility] LABORATORY Last Resulted: 04/21/25 14:20   [facility]: [facility] Subsidiaries and Affiliates  Result Received:  04/28/25 10:48    View Encounter        Received Information   Contains abnormal data PROTEIN / CREATININE RATIO, URINE  Order: [id number]   suggestion  Result Information displayed in this report will not trend and may not trigger automated decision support.      Ref Range & Units 7 d ago   Protein, Urine 50.0 - 80.0 mg/dL 8.4 Low    Creatinine, Urine MG/DL 89.8   Protein/Creatinine Ratio, Urine MG/G 94   Narrative    Samples containing amikacin, gentamicin, kanamycin, neomycin sulfate, and tobramycin should be avoided since these substances falsely increase urinary and CSF protein results.    Specimen Collected: 04/21/25 13:00    Performed by: [facility] LABORATORY Last Resulted: 04/21/25 13:31   Received From: [facility] and Its Subsidiaries and Affiliates  Result Received: 04/28/25 10:48    View Encounter        Received Information   Contains abnormal data CBC W/ AUTO DIFFERENTIAL  Order: [id number]   suggestion  Result Information displayed in this report will not trend and may not trigger automated decision support.      Ref Range & Units 7 d ago   White Blood Cell Count 4.0 - 11.0 1000/uL 7.8   Red Blood Cell Count 3.80 - 5.30 mill/uL 4.27   Hemoglobin 12.0 - 16.0 g/dL 13.9   Hematocrit 37.0 - 47.0 % 39   Mean Corpuscular Volume 80 - 100 fL 91   Mean Corpuscular Hemoglobin Conc 31.0 - 37.0 g/dL 35.7   RDW 12.1 - 14.9 % 13.4   Platelets 150 - 375 K/uL 220   MPV 6.5 - 12.0 fL 7   Neutrophils Relative 44 - 81 % 67   Lymphocytes % 21 - 47 % 19 Low    Monocytes % 2 - 11 % 11   Eosinophils % 0 - 7 % 2   Baso, CSF 0 - 1 % 1   Neutrophils, Abs 1.5 - 10.0 1000/UL 5.2   Lymphocytes Absolute 1.3 - 2.9 1000/ul 1.5   Monocytes Absolute Count 0.1 - 1.0 1000/ul 0.9   Eosinophils, Absolute 0.0 - 0.7 1000/UL 0.1   Basophils, Absolute 0.0 - 0.1 1000/UL 0.1     Specimen Collected: 04/21/25 13:00    Performed by: [facility] LABORATORY Last  Resulted: 04/21/25 13:16   Received From: [facility] and Its Subsidiaries and Affiliates  Result Received: 04/28/25 10:48    View Encounter        Received Information  B-TYPE NATRIURETIC PEPTIDE  Order: [id number]      Component  Ref Range & Units (hover) 1 mo ago   BNP 83              Narrative    BNP should not be used as absolute evidence for CHF.  The results  should be interpreted along with clinical findings and other laboratory  test results.    NOTE:           Symptomatic Patients with NO KNOWN HISTORY of CHF:  BNP        <40                        Symptoms are not likely due to CHF.  BNP        >400                       CHF is very likely.  BNP        >40  and  < 400, consider: Heart Failure, MI, Pulmonary Embolism,                                          Pneumonia, or other causes of dyspnea.                                                                                 Specimen Collected: 03/12/25 14:48 CDT    Performed By: [facility] LABORATORY Last Resulted: 03/12/25 16:06 CDT   [facility]  Result Received: 04/28/25 10:48       Received Information       View Full Report  Iron Binding Capacity (Iron and IBC)  Order: [id number]   suggestion  Information displayed in this report may not trend or trigger automated decision support.      Ref Range & Units 1 mo ago   TIBC 250 - 450 UG/   Iron Saturation 20 - 55 % 22   Iron 50 - 212 UG/DL 75     Specimen Collected: 03/12/25 14:48    Performed by: [facility] LABORATORY Last Resulted: 03/12/25 16:22   Received From: [facility] and Its Subsidiaries and Affiliates  Result Received: 04/28/25 10:48    View Encounter        Received Information    Imaging: DEXA, Xrays, MRIs, CTs, etc  dings      Mitral Valve    Mitral valve appears normal in  structure and function.      Aortic Valve    Aortic valve appears sclerotic without evidence of stenosis.    Aortic valve appears tricuspid.      Tricuspid Valve    Tricuspid valve appears normal in structure and function.    Mild tricuspid regurgitation.    Mild-moderate pulmonary hypertension. Estimated RVSP is 45 mmHg.      Pulmonic Valve    Pulmonic valve appears normal in structure and function.      Left Atrium    Moderate left atrial enlargement.      Left Ventricle    Left ventricle appears normal in size and function (EF=60%) with normal wall    motion.    Mild concentric left ventricular hypertrophy.    Grade II diastolic dysfunction with elevated LA pressure.    Global longitudinal strain (GLS) is - 17.2% suggestive of normal    longitudinal LV systolic function .      Right Atrium    Right atrial enlargement.      Right Ventricle    Right ventricular enlargement with normal function.      Pericardial Effusion    No evidence of pericardial effusion.      Pleural Effusion    No evidence of pleural effusion.      Miscellaneous    Aortic root dimension within normal limits.     Valves      Mitral Valve   Old & Outside Medical Records:  Reviewed old and all outside medical records available in Care Everywhere     Assessment:     Encounter Diagnoses   Name Primary?    Pulmonary HTN Yes    Comment: start revatio     Obesity, morbid     Comment: start zepbound     SLE (systemic lupus erythematosus related syndrome)     Systemic lupus erythematosus, unspecified SLE type, unspecified organ involvement status     BMI 35.0-35.9,adult     Comment: start zepbound       Plan:      Encounter Diagnoses   Name Primary?    Pulmonary HTN Yes    Obesity, morbid     SLE (systemic lupus erythematosus related syndrome)     Systemic lupus erythematosus, unspecified SLE type, unspecified organ involvement status     BMI 35.0-35.9,adult    Angelica was seen today for disease management.    Diagnoses and all orders for this  visit:    Pulmonary HTN  Comments:  start revatio  Orders:  -     sildenafil (REVATIO) 20 mg Tab; Take 1 tablet (20 mg total) by mouth 3 (three) times daily.  -     tirzepatide, weight loss, (ZEPBOUND) 2.5 mg/0.5 mL PnIj; Inject 2.5 mg into the skin every 7 days.  -     tirzepatide, weight loss, (ZEPBOUND) 5 mg/0.5 mL PnIj; Inject 5 mg into the skin every 7 days.  -     tirzepatide, weight loss, (ZEPBOUND) 7.5 mg/0.5 mL PnIj; Inject 7.5 mg into the skin every 7 days.  -     tirzepatide, weight loss, (ZEPBOUND) 10 mg/0.5 mL PnIj; Inject 10 mg into the skin every 7 days.  -     tirzepatide, weight loss, (ZEPBOUND) 12.5 mg/0.5 mL PnIj; Inject 12.5 mg into the skin every 7 days.  -     tirzepatide, weight loss, (ZEPBOUND) 15 mg/0.5 mL PnIj; Inject 15 mg into the skin every 7 days.    Obesity, morbid  Comments:  start zepbound  Orders:  -     tirzepatide, weight loss, (ZEPBOUND) 2.5 mg/0.5 mL PnIj; Inject 2.5 mg into the skin every 7 days.  -     tirzepatide, weight loss, (ZEPBOUND) 5 mg/0.5 mL PnIj; Inject 5 mg into the skin every 7 days.  -     tirzepatide, weight loss, (ZEPBOUND) 7.5 mg/0.5 mL PnIj; Inject 7.5 mg into the skin every 7 days.  -     tirzepatide, weight loss, (ZEPBOUND) 10 mg/0.5 mL PnIj; Inject 10 mg into the skin every 7 days.  -     tirzepatide, weight loss, (ZEPBOUND) 12.5 mg/0.5 mL PnIj; Inject 12.5 mg into the skin every 7 days.  -     tirzepatide, weight loss, (ZEPBOUND) 15 mg/0.5 mL PnIj; Inject 15 mg into the skin every 7 days.    SLE (systemic lupus erythematosus related syndrome)    Systemic lupus erythematosus, unspecified SLE type, unspecified organ involvement status    BMI 35.0-35.9,adult  Comments:  start zepbound  Orders:  -     tirzepatide, weight loss, (ZEPBOUND) 2.5 mg/0.5 mL PnIj; Inject 2.5 mg into the skin every 7 days.  -     tirzepatide, weight loss, (ZEPBOUND) 5 mg/0.5 mL PnIj; Inject 5 mg into the skin every 7 days.  -     tirzepatide, weight loss, (ZEPBOUND) 7.5 mg/0.5 mL  PnIj; Inject 7.5 mg into the skin every 7 days.  -     tirzepatide, weight loss, (ZEPBOUND) 10 mg/0.5 mL PnIj; Inject 10 mg into the skin every 7 days.  -     tirzepatide, weight loss, (ZEPBOUND) 12.5 mg/0.5 mL PnIj; Inject 12.5 mg into the skin every 7 days.  -     tirzepatide, weight loss, (ZEPBOUND) 15 mg/0.5 mL PnIj; Inject 15 mg into the skin every 7 days.        1. Add revatio  2. Add zepbound to help with weightloss that will help w/ sleep apnea  3. Labs ordered  4. Continue plaquenil  5. F/u 6 months         Pt has tried weight watchers, Lamar Rodrigo, Feng diet. She has counted calories and counted carbohydrates. She has tried intermittent  Fasting. She has done physical therapy. She has had a  and has seen a nutritionist. She has lost 10 % of her body weight but is presently at a standstill with her weight.  She tried adipex and ali and had sig sideeffects.    Follow-up 6 months    More than 50% of the  40 minute encounter was spent face to face counseling the patient regarding current status and future plan of care as well as side effects  of the medications. All questions were answered to patient's satisfaction also includes  non-face to face time preparing to see the patient (eg, review of tests), Obtaining and/or reviewing separately obtained history, Documenting clinical information in the electronic or other health record, Independently interpreting results            [1]   Social History  Tobacco Use    Smoking status: Never    Smokeless tobacco: Never   Substance Use Topics    Alcohol use: No    Drug use: No

## 2025-04-29 ENCOUNTER — TELEPHONE (OUTPATIENT)
Dept: RHEUMATOLOGY | Facility: CLINIC | Age: 70
End: 2025-04-29
Payer: MEDICARE

## 2025-04-29 NOTE — TELEPHONE ENCOUNTER
Faxed Dr. Moctezuma office notes from 4/28/25 to Dr. Heidi Mayo at fax [phone] on 4/29/25.  Liset LEVINE

## 2025-05-08 ENCOUNTER — TELEPHONE (OUTPATIENT)
Dept: RHEUMATOLOGY | Facility: CLINIC | Age: 70
End: 2025-05-08
Payer: MEDICARE

## 2025-05-08 NOTE — TELEPHONE ENCOUNTER
----- Message from Lachelle sent at 5/8/2025  2:03 PM CDT -----  Contact: Patient  Type:  Needs Medical AdviceWho Called: PatientPharmacy name and phone #:  [id number] - [place], [place] - [address] [facility] [address] [place] [address]Jvmvt: [phone] Fax: [phone] the patient rather a call back or a response via [facility]? CallBest Call Back Number: [phone] Information: Patient is requesting a call back regarding medication   no

## 2025-06-11 ENCOUNTER — TELEPHONE (OUTPATIENT)
Dept: RHEUMATOLOGY | Facility: CLINIC | Age: 70
End: 2025-06-11
Payer: MEDICARE

## 2025-06-11 NOTE — TELEPHONE ENCOUNTER
----- Message from Davion Wilkerson sent at 6/5/2025 10:55 AM CDT -----  Regarding: Revatio Discontinuation  Zoë Miranda,I wanted to inform you that the patient's cardiologist discontinued Revatio and the patient requested we no longer call her for refills. At this time, we are closing her enrollment. Please let us know if we  can be of further assistance.Thank you!Rock Pereira, PharmDClinical Pharmacist [facility] P: [phone]

## 2025-08-21 DIAGNOSIS — M32.9 SYSTEMIC LUPUS ERYTHEMATOSUS, UNSPECIFIED SLE TYPE, UNSPECIFIED ORGAN INVOLVEMENT STATUS: Primary | ICD-10-CM

## 2025-08-25 RX ORDER — METHYLPREDNISOLONE ACETATE 80 MG/ML
80 INJECTION, SUSPENSION INTRA-ARTICULAR; INTRALESIONAL; INTRAMUSCULAR; SOFT TISSUE DAILY PRN
Qty: 1 ML | Refills: 3 | Status: SHIPPED | OUTPATIENT
Start: 2025-08-25